# Patient Record
Sex: FEMALE | Race: WHITE | Employment: UNEMPLOYED | URBAN - METROPOLITAN AREA
[De-identification: names, ages, dates, MRNs, and addresses within clinical notes are randomized per-mention and may not be internally consistent; named-entity substitution may affect disease eponyms.]

---

## 2021-11-13 ENCOUNTER — HOSPITAL ENCOUNTER (EMERGENCY)
Age: 61
Discharge: HOME | End: 2021-11-13
Attending: EMERGENCY MEDICINE | Admitting: EMERGENCY MEDICINE
Payer: COMMERCIAL

## 2021-11-13 ENCOUNTER — APPOINTMENT (OUTPATIENT)
Dept: GENERAL RADIOLOGY | Age: 61
End: 2021-11-13
Attending: PHYSICIAN ASSISTANT
Payer: COMMERCIAL

## 2021-11-13 VITALS
WEIGHT: 167.77 LBS | DIASTOLIC BLOOD PRESSURE: 84 MMHG | HEIGHT: 64 IN | BODY MASS INDEX: 28.64 KG/M2 | TEMPERATURE: 98.5 F | HEART RATE: 86 BPM | OXYGEN SATURATION: 98 % | SYSTOLIC BLOOD PRESSURE: 119 MMHG | RESPIRATION RATE: 14 BRPM
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
## 2021-11-13 LAB
ALBUMIN SERPL-MCNC: 3.7 G/DL (ref 3.5–5)
ALBUMIN/GLOB SERPL: 0.9 {RATIO} (ref 1.1–2.2)
ALP SERPL-CCNC: 55 U/L (ref 45–117)
ALT SERPL-CCNC: 31 U/L (ref 12–78)
ANION GAP SERPL CALC-SCNC: 7 MMOL/L (ref 5–15)
APTT PPP: 26.6 SEC (ref 22.1–31)
AST SERPL-CCNC: 18 U/L (ref 15–37)
BASOPHILS # BLD: 0.1 K/UL (ref 0–0.1)
BASOPHILS NFR BLD: 1 % (ref 0–1)
BILIRUB SERPL-MCNC: 0.3 MG/DL (ref 0.2–1)
BUN SERPL-MCNC: 12 MG/DL (ref 6–20)
BUN/CREAT SERPL: 12 (ref 12–20)
CALCIUM SERPL-MCNC: 10.1 MG/DL (ref 8.5–10.1)
CHLORIDE SERPL-SCNC: 101 MMOL/L (ref 97–108)
CO2 SERPL-SCNC: 29 MMOL/L (ref 21–32)
CREAT SERPL-MCNC: 1.03 MG/DL (ref 0.55–1.02)
DIFFERENTIAL METHOD BLD: NORMAL
EOSINOPHIL # BLD: 0.1 K/UL (ref 0–0.4)
EOSINOPHIL NFR BLD: 1 % (ref 0–7)
ERYTHROCYTE [DISTWIDTH] IN BLOOD BY AUTOMATED COUNT: 13.5 % (ref 11.5–14.5)
GLOBULIN SER CALC-MCNC: 3.9 G/DL (ref 2–4)
GLUCOSE SERPL-MCNC: 99 MG/DL (ref 65–100)
HCT VFR BLD AUTO: 42.9 % (ref 35–47)
HGB BLD-MCNC: 14.5 G/DL (ref 11.5–16)
IMM GRANULOCYTES # BLD AUTO: 0 K/UL (ref 0–0.04)
IMM GRANULOCYTES NFR BLD AUTO: 0 % (ref 0–0.5)
INR PPP: 1 (ref 0.9–1.1)
LYMPHOCYTES # BLD: 3.1 K/UL (ref 0.8–3.5)
LYMPHOCYTES NFR BLD: 28 % (ref 12–49)
MCH RBC QN AUTO: 31 PG (ref 26–34)
MCHC RBC AUTO-ENTMCNC: 33.8 G/DL (ref 30–36.5)
MCV RBC AUTO: 91.9 FL (ref 80–99)
MONOCYTES # BLD: 0.7 K/UL (ref 0–1)
MONOCYTES NFR BLD: 6 % (ref 5–13)
NEUTS SEG # BLD: 7 K/UL (ref 1.8–8)
NEUTS SEG NFR BLD: 64 % (ref 32–75)
NRBC # BLD: 0 K/UL (ref 0–0.01)
NRBC BLD-RTO: 0 PER 100 WBC
PLATELET # BLD AUTO: 359 K/UL (ref 150–400)
PMV BLD AUTO: 9.5 FL (ref 8.9–12.9)
POTASSIUM SERPL-SCNC: 3.1 MMOL/L (ref 3.5–5.1)
PROT SERPL-MCNC: 7.6 G/DL (ref 6.4–8.2)
PROTHROMBIN TIME: 10.3 SEC (ref 9–11.1)
RBC # BLD AUTO: 4.67 M/UL (ref 3.8–5.2)
SODIUM SERPL-SCNC: 137 MMOL/L (ref 136–145)
THERAPEUTIC RANGE,PTTT: NORMAL SECS (ref 58–77)
WBC # BLD AUTO: 11 K/UL (ref 3.6–11)

## 2021-11-13 PROCEDURE — 99282 EMERGENCY DEPT VISIT SF MDM: CPT

## 2021-11-13 PROCEDURE — 73090 X-RAY EXAM OF FOREARM: CPT

## 2021-11-13 PROCEDURE — 85610 PROTHROMBIN TIME: CPT

## 2021-11-13 PROCEDURE — 80053 COMPREHEN METABOLIC PANEL: CPT

## 2021-11-13 PROCEDURE — 36415 COLL VENOUS BLD VENIPUNCTURE: CPT

## 2021-11-13 PROCEDURE — 85730 THROMBOPLASTIN TIME PARTIAL: CPT

## 2021-11-13 PROCEDURE — 85025 COMPLETE CBC W/AUTO DIFF WBC: CPT

## 2021-11-13 RX ORDER — DOXYCYCLINE 100 MG/1
100 CAPSULE ORAL 2 TIMES DAILY
Qty: 20 CAPSULE | Refills: 0 | Status: SHIPPED | OUTPATIENT
Start: 2021-11-13 | End: 2021-11-23

## 2021-11-13 RX ORDER — IBUPROFEN 600 MG/1
600 TABLET ORAL
Qty: 20 TABLET | Refills: 0 | Status: SHIPPED | OUTPATIENT
Start: 2021-11-13

## 2021-11-13 NOTE — ED NOTES
Pt reporting [facility] contacted poison control.  Pt did not visualize the snake and was bit between 2400 and 0100 this am.     1415 Per PA, not to contact poison control again at this time

## 2021-11-13 NOTE — ED PROVIDER NOTES
EMERGENCY DEPARTMENT HISTORY AND PHYSICAL EXAM      Date: 11/13/2021  Patient Name: Geraldine Montgomery    History of Presenting Illness     Chief Complaint   Patient presents with    Snake Bite     getting belem boxes decorations out of garage at midnight felt jab left forearm; went to [facility] today sent to ED for snake bite. left forearm aches stings and slight throb. History Provided By: Patient    HPI: Geraldine Montgomery, 63 y.o. female presents ambulatory with her daughter to the ED with cc of about 14 hours of 4 out of 10 constant, achy tenderness to the skin of the left forearm that is worse with movement and palpation. She tells me around midnight last night she went out to her she had to move some [place] boxes. She tells me it was pitch black and when she went to reach for the box she felt the sudden, sharp pain of her left forearm. She has me ultimately she went back inside and noticed \"bite marks\" of her left forearm. She believes she was bit by a snake. She tells me it was pitch black and she did not see a snake. She does not feel unwell. There has been no vomiting. She tells me she went to an urgent care earlier and her tetanus was updated. Because there is concern of a snake bite, she was referred here. She is right-hand dominant. She denies any difficulty moving her hand, wrist, forearm or elbow. There are no other complaints, changes, or physical findings at this time. PCP: Linda Allen MD    Current Outpatient Medications   Medication Sig Dispense Refill    doxycycline (MONODOX) 100 mg capsule Take 1 Capsule by mouth two (2) times a day for 10 days. 20 Capsule 0    ibuprofen (MOTRIN) 600 mg tablet Take 1 Tablet by mouth every six (6) hours as needed for Pain. 20 Tablet 0    hydrochlorothiazide (HYDRODIURIL) 25 mg tablet Take 25 mg by mouth daily.        Past History     Past Medical History:  Past Medical History:   Diagnosis Date    High cholesterol     Infectious disease     Other ill-defined conditions(739.89)     fluid build up in chest       Past Surgical History:  Past Surgical History:   Procedure Laterality Date    HX BREAST BIOPSY         Family History:  No family history on file. Social History:  Social History     Tobacco Use    Smoking status: Current Every Day Smoker     Packs/day: 0.50    Smokeless tobacco: Not on file   Substance Use Topics    Alcohol use: Yes     Comment: about 4-6 beers per week    Drug use: Yes     Types: Marijuana     Comment: pt jeromy mayes yesterday       Allergies: Allergies   Allergen Reactions    Erythromycin Other (comments)     \"Chest Pain\"    Pcn [Penicillins] Rash    Sulfa (Sulfonamide Antibiotics) Rash     Review of Systems   Review of Systems   Constitutional: Negative for fatigue and fever. HENT: Negative for ear pain and sore throat. Eyes: Negative for pain, redness and visual disturbance. Respiratory: Negative for cough and shortness of breath. Cardiovascular: Negative for chest pain and palpitations. Gastrointestinal: Negative for abdominal pain, nausea and vomiting. Genitourinary: Negative for dysuria, frequency and urgency. Musculoskeletal: Negative for back pain, gait problem, neck pain and neck stiffness. Skin: Positive for wound (Possible snake bite to the left forearm). Negative for rash. Neurological: Negative for dizziness, weakness, light-headedness, numbness and headaches. Physical Exam   Physical Exam  Vitals and nursing note reviewed. Constitutional:       General: She is not in acute distress. Appearance: She is well-developed. She is not toxic-appearing. HENT:      Head: Normocephalic and atraumatic. Jaw: No trismus. Right Ear: External ear normal.      Left Ear: External ear normal.      Nose: Nose normal.      Mouth/Throat:      Pharynx: Uvula midline. Eyes:      General: No scleral icterus.      Conjunctiva/sclera: Conjunctivae normal. Pupils: Pupils are equal, round, and reactive to light. Cardiovascular:      Rate and Rhythm: Normal rate and regular rhythm. Pulmonary:      Effort: Pulmonary effort is normal. No tachypnea, accessory muscle usage or respiratory distress. Breath sounds: No decreased breath sounds or wheezing. Abdominal:      Palpations: Abdomen is soft. Tenderness: There is no abdominal tenderness. Musculoskeletal:         General: Normal range of motion. Left forearm: Tenderness present. No swelling. Arms:       Cervical back: Full passive range of motion without pain and normal range of motion. Comments:   LEFT FOREARM:  Several abrasions of the left forearm without swelling or redness. She has full active range of motion of all joints of all fingers, wrist, elbow and shoulder. There is mild local tenderness. Skin:     Findings: No rash. Neurological:      Mental Status: She is alert and oriented to person, place, and time. She is not disoriented. GCS: GCS eye subscore is 4. GCS verbal subscore is 5. GCS motor subscore is 6. Cranial Nerves: No cranial nerve deficit. Psychiatric:         Speech: Speech normal.       Diagnostic Study Results     Labs -     Recent Results (from the past 12 hour(s))   CBC WITH AUTOMATED DIFF    Collection Time: 11/13/21  1:16 PM   Result Value Ref Range    WBC 11.0 3.6 - 11.0 K/uL    RBC 4.67 3.80 - 5.20 M/uL    HGB 14.5 11.5 - 16.0 g/dL    HCT 42.9 35.0 - 47.0 %    MCV 91.9 80.0 - 99.0 FL    MCH 31.0 26.0 - 34.0 PG    MCHC 33.8 30.0 - 36.5 g/dL    RDW 13.5 11.5 - 14.5 %    PLATELET 001 527 - 808 K/uL    MPV 9.5 8.9 - 12.9 FL    NRBC 0.0 0  WBC    ABSOLUTE NRBC 0.00 0.00 - 0.01 K/uL    NEUTROPHILS 64 32 - 75 %    LYMPHOCYTES 28 12 - 49 %    MONOCYTES 6 5 - 13 %    EOSINOPHILS 1 0 - 7 %    BASOPHILS 1 0 - 1 %    IMMATURE GRANULOCYTES 0 0.0 - 0.5 %    ABS. NEUTROPHILS 7.0 1.8 - 8.0 K/UL    ABS. LYMPHOCYTES 3.1 0.8 - 3.5 K/UL    ABS.  MONOCYTES 0.7 0.0 - 1.0 K/UL    ABS. EOSINOPHILS 0.1 0.0 - 0.4 K/UL    ABS. BASOPHILS 0.1 0.0 - 0.1 K/UL    ABS. IMM. GRANS. 0.0 0.00 - 0.04 K/UL    DF AUTOMATED     METABOLIC PANEL, COMPREHENSIVE    Collection Time: 11/13/21  1:16 PM   Result Value Ref Range    Sodium 137 136 - 145 mmol/L    Potassium 3.1 (L) 3.5 - 5.1 mmol/L    Chloride 101 97 - 108 mmol/L    CO2 29 21 - 32 mmol/L    Anion gap 7 5 - 15 mmol/L    Glucose 99 65 - 100 mg/dL    BUN 12 6 - 20 MG/DL    Creatinine 1.03 (H) 0.55 - 1.02 MG/DL    BUN/Creatinine ratio 12 12 - 20      GFR est AA >60 >60 ml/min/1.73m2    GFR est non-AA 55 (L) >60 ml/min/1.73m2    Calcium 10.1 8.5 - 10.1 MG/DL    Bilirubin, total 0.3 0.2 - 1.0 MG/DL    ALT (SGPT) 31 12 - 78 U/L    AST (SGOT) 18 15 - 37 U/L    Alk. phosphatase 55 45 - 117 U/L    Protein, total 7.6 6.4 - 8.2 g/dL    Albumin 3.7 3.5 - 5.0 g/dL    Globulin 3.9 2.0 - 4.0 g/dL    A-G Ratio 0.9 (L) 1.1 - 2.2     PROTHROMBIN TIME + INR    Collection Time: 11/13/21  1:16 PM   Result Value Ref Range    INR 1.0 0.9 - 1.1      Prothrombin time 10.3 9.0 - 11.1 sec   PTT    Collection Time: 11/13/21  1:16 PM   Result Value Ref Range    aPTT 26.6 22.1 - 31.0 sec    aPTT, therapeutic range     58.0 - 77.0 SECS       Radiologic Studies -   XR FOREARM LT AP/LAT   Final Result   No fracture or foreign body. CT Results  (Last 48 hours)    None        CXR Results  (Last 48 hours)    None        Medical Decision Making   I am the first provider for this patient. I reviewed the vital signs, available nursing notes, past medical history, past surgical history, family history and social history. Vital Signs-Reviewed the patient's vital signs.   Patient Vitals for the past 12 hrs:   Temp Pulse Resp BP SpO2   11/13/21 1306 98.5 °F (36.9 °C) 86 14 119/84 98 %       Pulse Oximetry Analysis - 98% on RA    Records Reviewed: Nursing Notes, Old Medical Records, Previous Radiology Studies and Previous Laboratory Studies    Provider Notes (Medical Decision Making):   DDx: Snakebite, foreign body, cellulitis    Patient presents with an injury to the left forearm that happened around midnight last night. Patient believes she may have been bitten by a snake. She was seen at a local urgent care where her tetanus was updated. Blood tests today in the emergency department are unremarkable. On exam, she has some abrasions. Plain films are negative for fracture or foreign body. Case discussed with Dr. Samson Collins. Believe reasonable to cover with antibiotics, offer nonnarcotic pain medicine and return precautions. ED Course:   Initial assessment performed. The patients presenting problems have been discussed, and they are in agreement with the care plan formulated and outlined with them. I have encouraged them to ask questions as they arise throughout their visit. Disposition:  Discharge    PLAN:  1. Discharge Medication List as of 11/13/2021  3:46 PM      START taking these medications    Details   doxycycline (MONODOX) 100 mg capsule Take 1 Capsule by mouth two (2) times a day for 10 days. , Normal, Disp-20 Capsule, R-0      ibuprofen (MOTRIN) 600 mg tablet Take 1 Tablet by mouth every six (6) hours as needed for Pain., Normal, Disp-20 Tablet, R-0         CONTINUE these medications which have NOT CHANGED    Details   hydrochlorothiazide (HYDRODIURIL) 25 mg tablet Take 25 mg by mouth daily. , Historical Med           2. Follow-up Information     Follow up With Specialties Details Why Contact Info    Jermaine Cordova MD Family Medicine Call  PRIMARY CARE: as needed [address]  [phone]          Return to ED if worse     Diagnosis     Clinical Impression:   1.  Snake bite, initial encounter

## 2022-12-07 ENCOUNTER — APPOINTMENT (OUTPATIENT)
Dept: CT IMAGING | Age: 62
DRG: 392 | End: 2022-12-07
Attending: INTERNAL MEDICINE
Payer: COMMERCIAL

## 2022-12-07 ENCOUNTER — APPOINTMENT (OUTPATIENT)
Dept: GENERAL RADIOLOGY | Age: 62
DRG: 392 | End: 2022-12-07
Attending: EMERGENCY MEDICINE
Payer: COMMERCIAL

## 2022-12-07 ENCOUNTER — APPOINTMENT (OUTPATIENT)
Dept: CT IMAGING | Age: 62
DRG: 392 | End: 2022-12-07
Attending: EMERGENCY MEDICINE
Payer: COMMERCIAL

## 2022-12-07 ENCOUNTER — HOSPITAL ENCOUNTER (INPATIENT)
Age: 62
LOS: 2 days | Discharge: HOME | DRG: 392 | End: 2022-12-09
Attending: EMERGENCY MEDICINE | Admitting: INTERNAL MEDICINE
Payer: COMMERCIAL

## 2022-12-07 DIAGNOSIS — K57.32 DIVERTICULITIS OF SIGMOID COLON: Primary | ICD-10-CM

## 2022-12-07 DIAGNOSIS — R16.0 LIVER MASSES: ICD-10-CM

## 2022-12-07 DIAGNOSIS — F41.9 ANXIETY: ICD-10-CM

## 2022-12-07 DIAGNOSIS — E87.6 HYPOKALEMIA: ICD-10-CM

## 2022-12-07 DIAGNOSIS — E87.1 HYPONATREMIA: ICD-10-CM

## 2022-12-07 DIAGNOSIS — R11.2 NAUSEA AND VOMITING, UNSPECIFIED VOMITING TYPE: ICD-10-CM

## 2022-12-07 PROBLEM — J11.1 INFLUENZA-LIKE ILLNESS: Status: ACTIVE | Noted: 2022-12-07

## 2022-12-07 LAB
ALBUMIN SERPL-MCNC: 2.2 G/DL (ref 3.5–5)
ALBUMIN/GLOB SERPL: 0.5 {RATIO} (ref 1.1–2.2)
ALP SERPL-CCNC: 169 U/L (ref 45–117)
ALT SERPL-CCNC: 102 U/L (ref 12–78)
ANION GAP SERPL CALC-SCNC: 11 MMOL/L (ref 5–15)
ANION GAP SERPL CALC-SCNC: 9 MMOL/L (ref 5–15)
APPEARANCE UR: CLEAR
AST SERPL-CCNC: 64 U/L (ref 15–37)
ATRIAL RATE: 61 BPM
BACTERIA URNS QL MICRO: ABNORMAL /HPF
BASOPHILS # BLD: 0 K/UL (ref 0–0.1)
BASOPHILS NFR BLD: 0 % (ref 0–1)
BILIRUB SERPL-MCNC: 0.9 MG/DL (ref 0.2–1)
BILIRUB UR QL: NEGATIVE
BUN SERPL-MCNC: 10 MG/DL (ref 6–20)
BUN SERPL-MCNC: 11 MG/DL (ref 6–20)
BUN/CREAT SERPL: 15 (ref 12–20)
BUN/CREAT SERPL: 15 (ref 12–20)
CALCIUM SERPL-MCNC: 8.8 MG/DL (ref 8.5–10.1)
CALCIUM SERPL-MCNC: 9 MG/DL (ref 8.5–10.1)
CALCULATED P AXIS, ECG09: 24 DEGREES
CALCULATED R AXIS, ECG10: -7 DEGREES
CALCULATED T AXIS, ECG11: 35 DEGREES
CEA SERPL-MCNC: 3.1 NG/ML
CHLORIDE SERPL-SCNC: 82 MMOL/L (ref 97–108)
CHLORIDE SERPL-SCNC: 83 MMOL/L (ref 97–108)
CK SERPL-CCNC: 28 U/L (ref 26–192)
CO2 SERPL-SCNC: 35 MMOL/L (ref 21–32)
CO2 SERPL-SCNC: 35 MMOL/L (ref 21–32)
COLOR UR: ABNORMAL
COVID-19 RAPID TEST, COVR: NOT DETECTED
CREAT SERPL-MCNC: 0.68 MG/DL (ref 0.55–1.02)
CREAT SERPL-MCNC: 0.71 MG/DL (ref 0.55–1.02)
DIAGNOSIS, 93000: NORMAL
DIFFERENTIAL METHOD BLD: ABNORMAL
EOSINOPHIL # BLD: 0 K/UL (ref 0–0.4)
EOSINOPHIL NFR BLD: 0 % (ref 0–7)
EPITH CASTS URNS QL MICRO: ABNORMAL /LPF
ERYTHROCYTE [DISTWIDTH] IN BLOOD BY AUTOMATED COUNT: 13.2 % (ref 11.5–14.5)
FLUAV AG NPH QL IA: NEGATIVE
FLUBV AG NOSE QL IA: NEGATIVE
GLOBULIN SER CALC-MCNC: 4.8 G/DL (ref 2–4)
GLUCOSE SERPL-MCNC: 117 MG/DL (ref 65–100)
GLUCOSE SERPL-MCNC: 123 MG/DL (ref 65–100)
GLUCOSE UR STRIP.AUTO-MCNC: NEGATIVE MG/DL
HCT VFR BLD AUTO: 37.2 % (ref 35–47)
HETEROPH AB BLD QL IA: NEGATIVE
HGB BLD-MCNC: 13.3 G/DL (ref 11.5–16)
HGB UR QL STRIP: ABNORMAL
IMM GRANULOCYTES # BLD AUTO: 0 K/UL (ref 0–0.04)
IMM GRANULOCYTES NFR BLD AUTO: 0 % (ref 0–0.5)
KETONES UR QL STRIP.AUTO: NEGATIVE MG/DL
LACTATE BLD-SCNC: 0.57 MMOL/L (ref 0.4–2)
LEUKOCYTE ESTERASE UR QL STRIP.AUTO: ABNORMAL
LIPASE SERPL-CCNC: 142 U/L (ref 73–393)
LYMPHOCYTES # BLD: 0.6 K/UL (ref 0.8–3.5)
LYMPHOCYTES NFR BLD: 3 % (ref 12–49)
MAGNESIUM SERPL-MCNC: 2 MG/DL (ref 1.6–2.4)
MCH RBC QN AUTO: 31.4 PG (ref 26–34)
MCHC RBC AUTO-ENTMCNC: 35.8 G/DL (ref 30–36.5)
MCV RBC AUTO: 87.9 FL (ref 80–99)
MONOCYTES # BLD: 0.2 K/UL (ref 0–1)
MONOCYTES NFR BLD: 1 % (ref 5–13)
NEUTS BAND NFR BLD MANUAL: 1 %
NEUTS SEG # BLD: 20.6 K/UL (ref 1.8–8)
NEUTS SEG NFR BLD: 95 % (ref 32–75)
NITRITE UR QL STRIP.AUTO: NEGATIVE
NRBC # BLD: 0 K/UL (ref 0–0.01)
NRBC BLD-RTO: 0 PER 100 WBC
P-R INTERVAL, ECG05: 146 MS
PH UR STRIP: 7 [PH] (ref 5–8)
PLATELET # BLD AUTO: 316 K/UL (ref 150–400)
PMV BLD AUTO: 10.6 FL (ref 8.9–12.9)
POTASSIUM SERPL-SCNC: 2.6 MMOL/L (ref 3.5–5.1)
POTASSIUM SERPL-SCNC: 2.7 MMOL/L (ref 3.5–5.1)
PROT SERPL-MCNC: 7 G/DL (ref 6.4–8.2)
PROT UR STRIP-MCNC: ABNORMAL MG/DL
Q-T INTERVAL, ECG07: 470 MS
QRS DURATION, ECG06: 104 MS
QTC CALCULATION (BEZET), ECG08: 473 MS
RBC # BLD AUTO: 4.23 M/UL (ref 3.8–5.2)
RBC #/AREA URNS HPF: ABNORMAL /HPF (ref 0–5)
RBC MORPH BLD: ABNORMAL
SODIUM SERPL-SCNC: 126 MMOL/L (ref 136–145)
SODIUM SERPL-SCNC: 129 MMOL/L (ref 136–145)
SOURCE, COVRS: NORMAL
SP GR UR REFRACTOMETRY: 1
TROPONIN-HIGH SENSITIVITY: 14 NG/L (ref 0–51)
UA: UC IF INDICATED,UAUC: ABNORMAL
UROBILINOGEN UR QL STRIP.AUTO: 1 EU/DL (ref 0.2–1)
VENTRICULAR RATE, ECG03: 61 BPM
WBC # BLD AUTO: 21.4 K/UL (ref 3.6–11)
WBC URNS QL MICRO: ABNORMAL /HPF (ref 0–4)

## 2022-12-07 PROCEDURE — 87804 INFLUENZA ASSAY W/OPTIC: CPT

## 2022-12-07 PROCEDURE — 87635 SARS-COV-2 COVID-19 AMP PRB: CPT

## 2022-12-07 PROCEDURE — 74011250637 HC RX REV CODE- 250/637: Performed by: NURSE PRACTITIONER

## 2022-12-07 PROCEDURE — 71260 CT THORAX DX C+: CPT

## 2022-12-07 PROCEDURE — 71046 X-RAY EXAM CHEST 2 VIEWS: CPT

## 2022-12-07 PROCEDURE — 74011250636 HC RX REV CODE- 250/636: Performed by: INTERNAL MEDICINE

## 2022-12-07 PROCEDURE — 82550 ASSAY OF CK (CPK): CPT

## 2022-12-07 PROCEDURE — 74011000636 HC RX REV CODE- 636: Performed by: INTERNAL MEDICINE

## 2022-12-07 PROCEDURE — 83735 ASSAY OF MAGNESIUM: CPT

## 2022-12-07 PROCEDURE — 74011250636 HC RX REV CODE- 250/636: Performed by: EMERGENCY MEDICINE

## 2022-12-07 PROCEDURE — 96375 TX/PRO/DX INJ NEW DRUG ADDON: CPT

## 2022-12-07 PROCEDURE — 74011250637 HC RX REV CODE- 250/637: Performed by: INTERNAL MEDICINE

## 2022-12-07 PROCEDURE — 84484 ASSAY OF TROPONIN QUANT: CPT

## 2022-12-07 PROCEDURE — 85025 COMPLETE CBC W/AUTO DIFF WBC: CPT

## 2022-12-07 PROCEDURE — 83690 ASSAY OF LIPASE: CPT

## 2022-12-07 PROCEDURE — 74011000636 HC RX REV CODE- 636: Performed by: EMERGENCY MEDICINE

## 2022-12-07 PROCEDURE — 93005 ELECTROCARDIOGRAM TRACING: CPT

## 2022-12-07 PROCEDURE — 74011250637 HC RX REV CODE- 250/637: Performed by: EMERGENCY MEDICINE

## 2022-12-07 PROCEDURE — 65270000046 HC RM TELEMETRY

## 2022-12-07 PROCEDURE — 96365 THER/PROPH/DIAG IV INF INIT: CPT

## 2022-12-07 PROCEDURE — 83605 ASSAY OF LACTIC ACID: CPT

## 2022-12-07 PROCEDURE — 96368 THER/DIAG CONCURRENT INF: CPT

## 2022-12-07 PROCEDURE — 81001 URINALYSIS AUTO W/SCOPE: CPT

## 2022-12-07 PROCEDURE — 74011000250 HC RX REV CODE- 250

## 2022-12-07 PROCEDURE — 74011250637 HC RX REV CODE- 250/637

## 2022-12-07 PROCEDURE — 87040 BLOOD CULTURE FOR BACTERIA: CPT

## 2022-12-07 PROCEDURE — 99285 EMERGENCY DEPT VISIT HI MDM: CPT

## 2022-12-07 PROCEDURE — 86308 HETEROPHILE ANTIBODY SCREEN: CPT

## 2022-12-07 PROCEDURE — 80053 COMPREHEN METABOLIC PANEL: CPT

## 2022-12-07 PROCEDURE — 74011000250 HC RX REV CODE- 250: Performed by: INTERNAL MEDICINE

## 2022-12-07 PROCEDURE — 74011000258 HC RX REV CODE- 258: Performed by: EMERGENCY MEDICINE

## 2022-12-07 PROCEDURE — 36415 COLL VENOUS BLD VENIPUNCTURE: CPT

## 2022-12-07 PROCEDURE — 82378 CARCINOEMBRYONIC ANTIGEN: CPT

## 2022-12-07 PROCEDURE — 74177 CT ABD & PELVIS W/CONTRAST: CPT

## 2022-12-07 RX ORDER — KETOROLAC TROMETHAMINE 30 MG/ML
15 INJECTION, SOLUTION INTRAMUSCULAR; INTRAVENOUS
Status: COMPLETED | OUTPATIENT
Start: 2022-12-07 | End: 2022-12-07

## 2022-12-07 RX ORDER — NAPROXEN 500 MG/1
500 TABLET ORAL 2 TIMES DAILY WITH MEALS
Qty: 20 TABLET | Refills: 0 | Status: ON HOLD | OUTPATIENT
Start: 2022-12-07 | End: 2022-12-17

## 2022-12-07 RX ORDER — ONDANSETRON 4 MG/1
4 TABLET, ORALLY DISINTEGRATING ORAL
Status: DISCONTINUED | OUTPATIENT
Start: 2022-12-07 | End: 2022-12-10 | Stop reason: HOSPADM

## 2022-12-07 RX ORDER — ACETAMINOPHEN 500 MG
1000 TABLET ORAL
Status: COMPLETED | OUTPATIENT
Start: 2022-12-07 | End: 2022-12-07

## 2022-12-07 RX ORDER — OXYCODONE HYDROCHLORIDE 5 MG/1
5 TABLET ORAL
Status: DISCONTINUED | OUTPATIENT
Start: 2022-12-07 | End: 2022-12-10 | Stop reason: HOSPADM

## 2022-12-07 RX ORDER — LEVOFLOXACIN 5 MG/ML
750 INJECTION, SOLUTION INTRAVENOUS EVERY 24 HOURS
Status: DISCONTINUED | OUTPATIENT
Start: 2022-12-08 | End: 2022-12-07

## 2022-12-07 RX ORDER — MORPHINE SULFATE 2 MG/ML
1 INJECTION, SOLUTION INTRAMUSCULAR; INTRAVENOUS
Status: DISCONTINUED | OUTPATIENT
Start: 2022-12-07 | End: 2022-12-10 | Stop reason: HOSPADM

## 2022-12-07 RX ORDER — SODIUM CHLORIDE 0.9 % (FLUSH) 0.9 %
5-40 SYRINGE (ML) INJECTION AS NEEDED
Status: DISCONTINUED | OUTPATIENT
Start: 2022-12-07 | End: 2022-12-10 | Stop reason: HOSPADM

## 2022-12-07 RX ORDER — ONDANSETRON 4 MG/1
4 TABLET, FILM COATED ORAL
Qty: 20 TABLET | Refills: 0 | Status: ON HOLD | OUTPATIENT
Start: 2022-12-07

## 2022-12-07 RX ORDER — POTASSIUM CHLORIDE 20 MEQ/1
40 TABLET, EXTENDED RELEASE ORAL
Status: COMPLETED | OUTPATIENT
Start: 2022-12-07 | End: 2022-12-07

## 2022-12-07 RX ORDER — POTASSIUM CHLORIDE 7.45 MG/ML
10 INJECTION INTRAVENOUS ONCE
Status: COMPLETED | OUTPATIENT
Start: 2022-12-07 | End: 2022-12-07

## 2022-12-07 RX ORDER — ACETAMINOPHEN 650 MG/1
650 SUPPOSITORY RECTAL
Status: DISCONTINUED | OUTPATIENT
Start: 2022-12-07 | End: 2022-12-10 | Stop reason: HOSPADM

## 2022-12-07 RX ORDER — LEVOFLOXACIN 5 MG/ML
750 INJECTION, SOLUTION INTRAVENOUS EVERY 24 HOURS
Status: DISCONTINUED | OUTPATIENT
Start: 2022-12-08 | End: 2022-12-10 | Stop reason: HOSPADM

## 2022-12-07 RX ORDER — SODIUM CHLORIDE 0.9 % (FLUSH) 0.9 %
5-40 SYRINGE (ML) INJECTION EVERY 8 HOURS
Status: DISCONTINUED | OUTPATIENT
Start: 2022-12-07 | End: 2022-12-10 | Stop reason: HOSPADM

## 2022-12-07 RX ORDER — WATER FOR INJECTION,STERILE
VIAL (ML) INJECTION
Status: COMPLETED
Start: 2022-12-07 | End: 2022-12-07

## 2022-12-07 RX ORDER — POLYETHYLENE GLYCOL 3350 17 G/17G
34 POWDER, FOR SOLUTION ORAL 2 TIMES DAILY
Status: DISPENSED | OUTPATIENT
Start: 2022-12-07 | End: 2022-12-08

## 2022-12-07 RX ORDER — POLYETHYLENE GLYCOL 3350 17 G/17G
17 POWDER, FOR SOLUTION ORAL DAILY PRN
Status: DISCONTINUED | OUTPATIENT
Start: 2022-12-07 | End: 2022-12-10 | Stop reason: HOSPADM

## 2022-12-07 RX ORDER — ACETAMINOPHEN 325 MG/1
650 TABLET ORAL
Status: DISCONTINUED | OUTPATIENT
Start: 2022-12-07 | End: 2022-12-10 | Stop reason: HOSPADM

## 2022-12-07 RX ORDER — METRONIDAZOLE 500 MG/100ML
500 INJECTION, SOLUTION INTRAVENOUS EVERY 8 HOURS
Status: DISCONTINUED | OUTPATIENT
Start: 2022-12-07 | End: 2022-12-10 | Stop reason: HOSPADM

## 2022-12-07 RX ORDER — LANOLIN ALCOHOL/MO/W.PET/CERES
3 CREAM (GRAM) TOPICAL
Status: DISCONTINUED | OUTPATIENT
Start: 2022-12-07 | End: 2022-12-10 | Stop reason: HOSPADM

## 2022-12-07 RX ORDER — ACETAMINOPHEN 500 MG
TABLET ORAL
Status: COMPLETED
Start: 2022-12-07 | End: 2022-12-07

## 2022-12-07 RX ORDER — LEVOFLOXACIN 5 MG/ML
750 INJECTION, SOLUTION INTRAVENOUS
Status: DISCONTINUED | OUTPATIENT
Start: 2022-12-08 | End: 2022-12-07

## 2022-12-07 RX ORDER — DIAZEPAM 10 MG/2ML
10 INJECTION INTRAMUSCULAR
Status: DISCONTINUED | OUTPATIENT
Start: 2022-12-07 | End: 2022-12-10 | Stop reason: HOSPADM

## 2022-12-07 RX ORDER — FOLIC ACID 1 MG/1
1 TABLET ORAL DAILY
Status: DISCONTINUED | OUTPATIENT
Start: 2022-12-07 | End: 2022-12-10 | Stop reason: HOSPADM

## 2022-12-07 RX ORDER — CEFTRIAXONE 500 MG/1
INJECTION, POWDER, FOR SOLUTION INTRAMUSCULAR; INTRAVENOUS
Status: DISCONTINUED
Start: 2022-12-07 | End: 2022-12-07 | Stop reason: SDUPTHER

## 2022-12-07 RX ORDER — POTASSIUM CHLORIDE 20 MEQ/1
TABLET, EXTENDED RELEASE ORAL
Status: COMPLETED
Start: 2022-12-07 | End: 2022-12-07

## 2022-12-07 RX ORDER — DIAZEPAM 10 MG/2ML
5 INJECTION INTRAMUSCULAR
Status: DISCONTINUED | OUTPATIENT
Start: 2022-12-07 | End: 2022-12-10 | Stop reason: HOSPADM

## 2022-12-07 RX ORDER — SODIUM CHLORIDE 9 MG/ML
75 INJECTION, SOLUTION INTRAVENOUS CONTINUOUS
Status: DISCONTINUED | OUTPATIENT
Start: 2022-12-07 | End: 2022-12-08

## 2022-12-07 RX ORDER — LEVOFLOXACIN 5 MG/ML
750 INJECTION, SOLUTION INTRAVENOUS
Status: COMPLETED | OUTPATIENT
Start: 2022-12-07 | End: 2022-12-07

## 2022-12-07 RX ORDER — ONDANSETRON 2 MG/ML
4 INJECTION INTRAMUSCULAR; INTRAVENOUS
Status: DISCONTINUED | OUTPATIENT
Start: 2022-12-07 | End: 2022-12-10 | Stop reason: HOSPADM

## 2022-12-07 RX ORDER — ASPIRIN 325 MG/1
100 TABLET, FILM COATED ORAL DAILY
Status: DISCONTINUED | OUTPATIENT
Start: 2022-12-07 | End: 2022-12-10 | Stop reason: HOSPADM

## 2022-12-07 RX ORDER — HYDRALAZINE HYDROCHLORIDE 20 MG/ML
10 INJECTION INTRAMUSCULAR; INTRAVENOUS
Status: DISCONTINUED | OUTPATIENT
Start: 2022-12-07 | End: 2022-12-10 | Stop reason: HOSPADM

## 2022-12-07 RX ORDER — POTASSIUM CHLORIDE 7.45 MG/ML
INJECTION INTRAVENOUS
Status: DISCONTINUED
Start: 2022-12-07 | End: 2022-12-07 | Stop reason: SDUPTHER

## 2022-12-07 RX ORDER — ONDANSETRON 2 MG/ML
4 INJECTION INTRAMUSCULAR; INTRAVENOUS
Status: COMPLETED | OUTPATIENT
Start: 2022-12-07 | End: 2022-12-07

## 2022-12-07 RX ADMIN — ONDANSETRON 4 MG: 2 INJECTION INTRAMUSCULAR; INTRAVENOUS at 02:11

## 2022-12-07 RX ADMIN — SODIUM CHLORIDE 75 ML/HR: 9 INJECTION, SOLUTION INTRAVENOUS at 16:33

## 2022-12-07 RX ADMIN — SODIUM CHLORIDE 1000 ML: 9 INJECTION, SOLUTION INTRAVENOUS at 03:02

## 2022-12-07 RX ADMIN — Medication 1 TABLET: at 09:21

## 2022-12-07 RX ADMIN — POLYETHYLENE GLYCOL 3350 34 G: 17 POWDER, FOR SOLUTION ORAL at 20:39

## 2022-12-07 RX ADMIN — CEFTRIAXONE SODIUM 1 G: 500 INJECTION, POWDER, FOR SOLUTION INTRAMUSCULAR; INTRAVENOUS at 03:07

## 2022-12-07 RX ADMIN — KETOROLAC TROMETHAMINE 15 MG: 30 INJECTION, SOLUTION INTRAMUSCULAR at 02:11

## 2022-12-07 RX ADMIN — WATER 20 ML: 1 INJECTION INTRAMUSCULAR; INTRAVENOUS; SUBCUTANEOUS at 03:08

## 2022-12-07 RX ADMIN — POTASSIUM CHLORIDE 40 MEQ: 1500 TABLET, EXTENDED RELEASE ORAL at 03:08

## 2022-12-07 RX ADMIN — ACETAMINOPHEN 650 MG: 325 TABLET ORAL at 17:31

## 2022-12-07 RX ADMIN — METRONIDAZOLE 500 MG: 500 INJECTION, SOLUTION INTRAVENOUS at 16:35

## 2022-12-07 RX ADMIN — IOPAMIDOL 100 ML: 755 INJECTION, SOLUTION INTRAVENOUS at 05:29

## 2022-12-07 RX ADMIN — FOLIC ACID 1 MG: 1 TABLET ORAL at 09:21

## 2022-12-07 RX ADMIN — SODIUM CHLORIDE, PRESERVATIVE FREE 10 ML: 5 INJECTION INTRAVENOUS at 09:22

## 2022-12-07 RX ADMIN — METRONIDAZOLE 500 MG: 500 INJECTION, SOLUTION INTRAVENOUS at 06:28

## 2022-12-07 RX ADMIN — SODIUM CHLORIDE 75 ML/HR: 9 INJECTION, SOLUTION INTRAVENOUS at 09:21

## 2022-12-07 RX ADMIN — ACETAMINOPHEN 1000 MG: 500 TABLET ORAL at 03:11

## 2022-12-07 RX ADMIN — SODIUM CHLORIDE, PRESERVATIVE FREE 10 ML: 5 INJECTION INTRAVENOUS at 16:35

## 2022-12-07 RX ADMIN — THIAMINE HCL TAB 100 MG 100 MG: 100 TAB at 09:21

## 2022-12-07 RX ADMIN — POTASSIUM CHLORIDE 40 MEQ: 20 TABLET, EXTENDED RELEASE ORAL at 02:25

## 2022-12-07 RX ADMIN — SODIUM CHLORIDE, PRESERVATIVE FREE 10 ML: 5 INJECTION INTRAVENOUS at 22:18

## 2022-12-07 RX ADMIN — LEVOFLOXACIN 750 MG: 5 INJECTION, SOLUTION INTRAVENOUS at 06:28

## 2022-12-07 RX ADMIN — IOPAMIDOL 100 ML: 755 INJECTION, SOLUTION INTRAVENOUS at 08:29

## 2022-12-07 RX ADMIN — POTASSIUM CHLORIDE 10 MEQ: 10 INJECTION, SOLUTION INTRAVENOUS at 03:08

## 2022-12-07 RX ADMIN — Medication 1000 MG: at 03:11

## 2022-12-07 RX ADMIN — METRONIDAZOLE 500 MG: 500 INJECTION, SOLUTION INTRAVENOUS at 22:18

## 2022-12-07 NOTE — H&P
Hospitalist Admission Note    NAME: Krupa Teague   :  1960   MRN:  [id number]     Date/Time:  2022 7:57 AM    Patient PCP: Mami Ambrosio MD  ______________________________________________________________________  Given the patient's current clinical presentation, I have a high level of concern for decompensation if discharged from the emergency department. Complex decision making was performed, which includes reviewing the patient's available past medical records, laboratory results, and x-ray films. My assessment of this patient's clinical condition and my plan of care is as follows. Assessment / Plan:  Acute diverticulitis  Liver masses concerning for metastatic disease  Generalized weakness  CT a/p showed mild sigmoid diverticulitis. Multiple mass lesions are noted within the liver compatible with metastatic disease. COVID and flu is ruled out   Will check CT chest to r/o lung pathology  F/u with Bcx  Check AFP  Will start IV levaquin/metronidazole  Suspect she will need biopsy  Keep NPO, will consult GI. Discussed with Dr Abbe Kat    Alcohol use  CIWA, MVI, folate thiamine     Tobacco use  Nicotine patch, counseling provided    Code Status: Full  DVT Prophylaxis:SCD for now   GI Prophylaxis: not indicated  Baseline: from home      Subjective:   CHIEF COMPLAINT: flu like symptoms    HISTORY OF PRESENT ILLNESS:     [facility] is a 63 y.o.   female with PMHx significant for HTN, HLD, alcohol use, presents to the ER for evaluation of flu like symptoms to include myalgia, generalized weakness and nausea/vomiting for the last few days. Pt also endorse subjective fever  with night sweats and inability to ambulate due to weakness. No fever, cp, sob, palpitations. Vitals/labs/imaging reviewed  We were asked to admit for work up and evaluation of the above problems.      Past Medical History:   Diagnosis Date    High cholesterol     Infectious disease     Other ill-defined conditions(799.89)     fluid build up in chest        Past Surgical History:   Procedure Laterality Date    HX BREAST BIOPSY         Social History     Tobacco Use    Smoking status: Every Day     Packs/day: 0.50     Types: Cigarettes    Smokeless tobacco: Not on file   Substance Use Topics    Alcohol use: Yes     Comment: about 4-6 beers per week        History reviewed. No pertinent family history. Allergies   Allergen Reactions    Erythromycin Other (comments)     \"Chest Pain\"    Pcn [Penicillins] Rash    Sulfa (Sulfonamide Antibiotics) Rash        Prior to Admission medications    Medication Sig Start Date End Date Taking? Authorizing Provider   ondansetron hcl (Zofran) 4 mg tablet Take 1 Tablet by mouth every eight (8) hours as needed for Nausea. 12/7/22  Yes Abi Hickman MD   naproxen (Naprosyn) 500 mg tablet Take 1 Tablet by mouth two (2) times daily (with meals) for 10 days. 12/7/22 12/17/22 Yes Abi Hickman MD   ibuprofen (MOTRIN) 600 mg tablet Take 1 Tablet by mouth every six (6) hours as needed for Pain. 11/13/21   Collette Current, PA   hydrochlorothiazide (HYDRODIURIL) 25 mg tablet Take 25 mg by mouth daily. Other, MD Sayra       REVIEW OF SYSTEMS:     I am not able to complete the review of systems because:    The patient is intubated and sedated    The patient has altered mental status due to his acute medical problems    The patient has baseline aphasia from prior stroke(s)    The patient has baseline dementia and is not reliable historian    The patient is in acute medical distress and unable to provide information           Total of 12 systems reviewed as follows:       POSITIVE= BOLD text  Negative = text not BOLD  General:  fever, chills, sweats, generalized weakness, weight loss/gain,      loss of appetite   Eyes:    blurred vision, eye pain, loss of vision, double vision  ENT:    rhinorrhea, pharyngitis   Respiratory:   cough, sputum production, SOB, THAO, wheezing, pleuritic pain Cardiology:   chest pain, palpitations, orthopnea, PND, edema, syncope   Gastrointestinal:  abdominal pain , N/V, diarrhea, dysphagia, constipation, bleeding   Genitourinary:  frequency, urgency, dysuria, hematuria, incontinence   Muskuloskeletal :  arthralgia, myalgia, back pain  Hematology:  easy bruising, nose or gum bleeding, lymphadenopathy   Dermatological: rash, ulceration, pruritis, color change / jaundice  Endocrine:   hot flashes or polydipsia   Neurological:  headache, dizziness, confusion, focal weakness, paresthesia,     Speech difficulties, memory loss, gait difficulty  Psychological: Feelings of anxiety, depression, agitation    Objective:   VITALS:    Visit Vitals  /68   Pulse 64   Temp 97.7 °F (36.5 °C)   Ht 5' 4\" (1.626 m)   Wt 77.1 kg (170 lb)   SpO2 96%   BMI 29.18 kg/m²       PHYSICAL EXAM:    General:    Alert, cooperative, no distress, appears stated age. HEENT: Atraumatic, anicteric sclerae, pink conjunctivae     No oral ulcers, mucosa moist, throat clear  Neck:  Supple, symmetrical,  thyroid: non tender  Lungs:   CTA b/l. No wheezing or Rhonchi. No rales. Chest wall:  No tenderness. No accessory muscle use. Heart:   Regular  rhythm,  No  Murmur. No edema  Abdomen:   Soft, NT. ND  BS+  Extremities: No cyanosis. No clubbing,      Skin turgor normal, Radial dial pulse 2+. Capillary refill normal  Skin:     Not pale. Not Jaundiced  No rashes   Psych:  Not depressed. Not anxious or agitated. Neurologic: No facial asymmetry. No aphasia or slurred speech. Symmetrical strength, Sensation grossly intact.  AAOx4.     _______________________________________________________________________  Care Plan discussed with:    Comments   Patient x    Family  x    RN x    Care Manager                    Consultant:  x ED physician   _______________________________________________________________________  Expected  Disposition:   Home with Family    HH/PT/OT/RN x   SNF/LTC    [facility] ________________________________________________________________________  TOTAL TIME:  70 Minutes    Critical Care Provided     Minutes non procedure based      Comments    x Reviewed previous records   >50% of visit spent in counseling and coordination of care x Discussion with patient and/or family and questions answered       ________________________________________________________________________  Signed: Brett Chavez MD    Procedures: see electronic medical records for all procedures/Xrays and details which were not copied into this note but were reviewed prior to creation of Plan. LAB DATA REVIEWED:    Recent Results (from the past 24 hour(s))   INFLUENZA A+B VIRAL AGS    Collection Time: 12/07/22 12:40 AM   Result Value Ref Range    Influenza A Antigen Negative NEG      Influenza B Antigen Negative NEG     COVID-19 RAPID TEST    Collection Time: 12/07/22 12:40 AM   Result Value Ref Range    Specimen source Nasopharyngeal      COVID-19 rapid test Not detected NOTD     CBC WITH AUTOMATED DIFF    Collection Time: 12/07/22  1:14 AM   Result Value Ref Range    WBC 21.4 (H) 3.6 - 11.0 K/uL    RBC 4.23 3.80 - 5.20 M/uL    HGB 13.3 11.5 - 16.0 g/dL    HCT 37.2 35.0 - 47.0 %    MCV 87.9 80.0 - 99.0 FL    MCH 31.4 26.0 - 34.0 PG    MCHC 35.8 30.0 - 36.5 g/dL    RDW 13.2 11.5 - 14.5 %    PLATELET 626 438 - 300 K/uL    MPV 10.6 8.9 - 12.9 FL    NRBC 0.0 0  WBC    ABSOLUTE NRBC 0.00 0.00 - 0.01 K/uL    NEUTROPHILS 95 (H) 32 - 75 %    BAND NEUTROPHILS 1 %    LYMPHOCYTES 3 (L) 12 - 49 %    MONOCYTES 1 (L) 5 - 13 %    EOSINOPHILS 0 0 - 7 %    BASOPHILS 0 0 - 1 %    IMMATURE GRANULOCYTES 0 0.0 - 0.5 %    ABS. NEUTROPHILS 20.6 (H) 1.8 - 8.0 K/UL    ABS. LYMPHOCYTES 0.6 (L) 0.8 - 3.5 K/UL    ABS. MONOCYTES 0.2 0.0 - 1.0 K/UL    ABS. EOSINOPHILS 0.0 0.0 - 0.4 K/UL    ABS. BASOPHILS 0.0 0.0 - 0.1 K/UL    ABS. IMM.  GRANS. 0.0 0.00 - 0.04 K/UL    DF MANUAL      RBC COMMENTS NORMOCYTIC, NORMOCHROMIC     METABOLIC PANEL, COMPREHENSIVE    Collection Time: 12/07/22  1:14 AM   Result Value Ref Range    Sodium 126 (L) 136 - 145 mmol/L    Potassium 2.6 (LL) 3.5 - 5.1 mmol/L    Chloride 82 (L) 97 - 108 mmol/L    CO2 35 (H) 21 - 32 mmol/L    Anion gap 9 5 - 15 mmol/L    Glucose 123 (H) 65 - 100 mg/dL    BUN 10 6 - 20 MG/DL    Creatinine 0.68 0.55 - 1.02 MG/DL    BUN/Creatinine ratio 15 12 - 20      eGFR >60 >60 ml/min/1.73m2    Calcium 9.0 8.5 - 10.1 MG/DL    Bilirubin, total 0.9 0.2 - 1.0 MG/DL    ALT (SGPT) 102 (H) 12 - 78 U/L    AST (SGOT) 64 (H) 15 - 37 U/L    Alk.  phosphatase 169 (H) 45 - 117 U/L    Protein, total 7.0 6.4 - 8.2 g/dL    Albumin 2.2 (L) 3.5 - 5.0 g/dL    Globulin 4.8 (H) 2.0 - 4.0 g/dL    A-G Ratio 0.5 (L) 1.1 - 2.2     URINALYSIS W/ REFLEX CULTURE    Collection Time: 12/07/22  1:14 AM    Specimen: Urine   Result Value Ref Range    Color YELLOW/STRAW      Appearance CLEAR CLEAR      Specific gravity 1.005      pH (UA) 7.0 5.0 - 8.0      Protein TRACE (A) NEG mg/dL    Glucose Negative NEG mg/dL    Ketone Negative NEG mg/dL    Bilirubin Negative NEG      Blood SMALL (A) NEG      Urobilinogen 1.0 0.2 - 1.0 EU/dL    Nitrites Negative NEG      Leukocyte Esterase TRACE (A) NEG      WBC 0-4 0 - 4 /hpf    RBC 5-10 0 - 5 /hpf    Epithelial cells FEW FEW /lpf    Bacteria 1+ (A) NEG /hpf    UA:UC IF INDICATED CULTURE NOT INDICATED BY UA RESULT CNI     CULTURE, BLOOD, PAIRED    Collection Time: 12/07/22  1:50 AM    Specimen: Blood   Result Value Ref Range    Special Requests: NO SPECIAL REQUESTS      Culture result: NO GROWTH AFTER 4 HOURS     POC LACTIC ACID    Collection Time: 12/07/22  1:54 AM   Result Value Ref Range    Lactic Acid (POC) 0.57 0.40 - 2.00 mmol/L   LIPASE    Collection Time: 12/07/22  5:02 AM   Result Value Ref Range    Lipase 142 73 - 584 U/L   METABOLIC PANEL, BASIC    Collection Time: 12/07/22  5:02 AM   Result Value Ref Range    Sodium 129 (L) 136 - 145 mmol/L    Potassium 2.7 (LL) 3.5 - 5.1 mmol/L Chloride 83 (L) 97 - 108 mmol/L    CO2 35 (H) 21 - 32 mmol/L    Anion gap 11 5 - 15 mmol/L    Glucose 117 (H) 65 - 100 mg/dL    BUN 11 6 - 20 MG/DL    Creatinine 0.71 0.55 - 1.02 MG/DL    BUN/Creatinine ratio 15 12 - 20      eGFR >60 >60 ml/min/1.73m2    Calcium 8.8 8.5 - 10.1 MG/DL   MONONUCLEOSIS SCREEN    Collection Time: 12/07/22  5:02 AM   Result Value Ref Range    Mononucleosis screen Negative NEG     CK    Collection Time: 12/07/22  5:02 AM   Result Value Ref Range    CK 28 26 - 192 U/L   TROPONIN-HIGH SENSITIVITY    Collection Time: 12/07/22  5:02 AM   Result Value Ref Range    Troponin-High Sensitivity 14 0 - 51 ng/L   MAGNESIUM    Collection Time: 12/07/22  5:02 AM   Result Value Ref Range    Magnesium 2.0 1.6 - 2.4 mg/dL   EKG, 12 LEAD, INITIAL    Collection Time: 12/07/22  5:04 AM   Result Value Ref Range    Ventricular Rate 61 BPM    Atrial Rate 61 BPM    P-R Interval 146 ms    QRS Duration 104 ms    Q-T Interval 470 ms    QTC Calculation (Bezet) 473 ms    Calculated P Axis 24 degrees    Calculated R Axis -7 degrees    Calculated T Axis 35 degrees    Diagnosis       Normal sinus rhythm  Incomplete right bundle branch block  Minimal voltage criteria for LVH, may be normal variant  Nonspecific T wave abnormality  Prolonged QT  When compared with ECG of 09-MAY-2013 11:26,  No significant change was found

## 2022-12-07 NOTE — Clinical Note
[address]  [facility] EMERGENCY DEPT  [address] [id number] [phone]    Work/School Note    Date: 12/7/2022    To Whom It May concern:      Lashon Campbell was seen and treated today in the emergency room by the following provider(s):  Attending Provider: Marilu Gold MD.      Lashon Campbell is excused from work/school on 12/07/22. She is clear to return to work/school on 12/08/22.         Sincerely,          Amanda Byrd MD

## 2022-12-07 NOTE — CONSULTS
GI CONSULTATION NOTE  Dasha Astudillo, NP  [phone] NP in-hospital cell phone M-F until 4:30  After 5pm or on weekends, please call  for physician on call    NAME: Mary Jane Riley  :  1960  MRN: [id number]   Attending: Dr Janice Go   Primary GI: Dr Ashley Schuster  Date/Time:  2022 1:51 PM  Assessment:   Abnormal CT scan   Sigmoid colonic wall thickening - acute diverticulitis vs malignancy  Multiple liver lesions concerning for mets  ETOH use  General weakness  - CT abd/pel W 22 :  Mild sigmoid diverticulitis. Multiple mass lesions are noted within the liver compatible with metastatic disease.  - 22 : WBC 21.4, Hgb 13.3, Plt 316, INR 1.0, Na 129, K 2.7, Cr 0.7, TB 0.9, Albumin 2.2, , AST 64,   - Whiskey 8 ounces every day to every other day for the last 5+ yrs.   - Reports a change in bowel habits about a year ago, from formed stools to now loose with frequent specks of blood- thought it was hemorrhoids  - Colonoscopy  (Dr Crystal Weaver) - diverticulosis, hyperplastic polyp.  - No abdominal pain but mild generalized discomfort upon palpation.   - Nausea without vomiting, increasing fatigue the last 2 weeks  - No weight loss except in the last week - about 5-10lbs. Hypokalemia  Hyperlipidemia  Tobacco use  Plan:   CEA, AFP, CA 19-9 pending  Colonoscopy on Friday at 1330  Clear liquids today/tomorrow - NPO on 22  Golytely at 1600 tomorrow  Miralax 34gm tonight and tomorrow AM  Agree with levoquin and flagyl  Rest per primary team.     Plan discussed with Dr Ashley Schuster. Thank you for consultation. Subjective:     HISTORY OF PRESENT ILLNESS:     Mary Jane Riley is an 63 y.o.  female who we are asked to see for complaint of abnormal CT scan. Medical history includes hyperlipidemia, ETOH dependence, tobacco use.  Pt presented to the hospital for generalized fatigued and nausea without vomiting - started a little less than 2 weeks ago but has been getting worse. Felt hot and sweaty at times. Pt states that about a year ago, she had some stool habits changed from formed to loose about 5-6x per day with specks of blood. Continued loose stool but has actually decreased with decrease in intake. Thought blood was from hemorrhoids. Denies abdominal pain. No blood thinners. No weight loss except with acute illness. Colonoscopy 2013 - hyperplastic polyp. No family hx of CRC. Past Medical History:   Diagnosis Date    High cholesterol     Infectious disease     Other ill-defined conditions(799.89)     fluid build up in chest      Past Surgical History:   Procedure Laterality Date    HX BREAST BIOPSY       Social History     Tobacco Use    Smoking status: Every Day     Packs/day: 0.50     Types: Cigarettes    Smokeless tobacco: Not on file   Substance Use Topics    Alcohol use: Yes     Comment: about 4-6 beers per week      History reviewed. No pertinent family history. Allergies   Allergen Reactions    Erythromycin Other (comments)     \"Chest Pain\"    Pcn [Penicillins] Rash    Sulfa (Sulfonamide Antibiotics) Rash      Prior to Admission medications    Medication Sig Start Date End Date Taking? Authorizing Provider   ondansetron hcl (Zofran) 4 mg tablet Take 1 Tablet by mouth every eight (8) hours as needed for Nausea. 12/7/22  Yes Rancho Castle MD   naproxen (Naprosyn) 500 mg tablet Take 1 Tablet by mouth two (2) times daily (with meals) for 10 days. 12/7/22 12/17/22 Yes Rancho Castle MD   ibuprofen (MOTRIN) 600 mg tablet Take 1 Tablet by mouth every six (6) hours as needed for Pain. 11/13/21   QUINN Umana   hydrochlorothiazide (HYDRODIURIL) 25 mg tablet Take 25 mg by mouth daily.     Other, MD Sayra       Patient Active Problem List   Diagnosis Code    Influenza-like illness J11.1    Liver mass R16.0       REVIEW OF SYSTEMS:    Constitutional: negative fever, negative chills, negative weight loss  Eyes:   negative visual changes  ENT:   negative sore throat, tongue or lip swelling   Respiratory:  negative cough, negative dyspnea  Cards:  negative for chest pain, palpitations, lower extremity edema  GI:   See HPI  :  negative for frequency, dysuria  Integument:  negative for rash and pruritus  Heme:  negative for easy bruising and gum/nose bleeding  Musculoskel: negative for myalgias,  back pain and muscle weakness  Neuro: negative for headaches, dizziness, vertigo  Psych: negative for feelings of anxiety, depression     Pertinent Positives: fatigue, weakenss      Objective:   VITALS:    Visit Vitals  /68   Pulse 64   Temp 97.7 °F (36.5 °C)   Ht 5' 4\" (1.626 m)   Wt 77.1 kg (170 lb)   SpO2 96%   BMI 29.18 kg/m²       PHYSICAL EXAM:   General:          Alert, WD, WN, cooperative, no distress, appears stated age. Head:               Normocephalic, without obvious abnormality, atraumatic. Eyes:               Conjunctivae clear and pale, anicteric sclerae. Pupils are equal  Nose:               Nares normal.   Throat:             Lips, mucosa, and tongue normal.  No Thrush  Neck:               Supple, symmetrical,  no adenopathy, thyroid: non tender  Back:               Symmetric,  No CVA tenderness. Lungs:             CTA bilaterally. Chest wall:      No tenderness or deformity. No Accessory muscle use. Heart:              Regular rate and rhythm,  no murmur, rub or gallop. Abdomen:        Soft, mild generalized tenderness. Not distended. Bowel sounds normal. No masses  Extremities:     Atraumatic, No cyanosis. No edema. Skin:                Texture, turgor normal. No rashes/lesions/jaundice  Lymph:            Cervical, supraclavicular normal.  Psych:             Good insight. Not depressed. Not anxious or agitated. Neurologic:      EOMs intact. No facial asymmetry. No aphasia or slurred speech. Normal                        strength, A/O X 3.      LAB DATA REVIEWED:    Recent Results (from the past 24 hour(s))   INFLUENZA A+B VIRAL AGS Collection Time: 12/07/22 12:40 AM   Result Value Ref Range    Influenza A Antigen Negative NEG      Influenza B Antigen Negative NEG     COVID-19 RAPID TEST    Collection Time: 12/07/22 12:40 AM   Result Value Ref Range    Specimen source Nasopharyngeal      COVID-19 rapid test Not detected NOTD     CBC WITH AUTOMATED DIFF    Collection Time: 12/07/22  1:14 AM   Result Value Ref Range    WBC 21.4 (H) 3.6 - 11.0 K/uL    RBC 4.23 3.80 - 5.20 M/uL    HGB 13.3 11.5 - 16.0 g/dL    HCT 37.2 35.0 - 47.0 %    MCV 87.9 80.0 - 99.0 FL    MCH 31.4 26.0 - 34.0 PG    MCHC 35.8 30.0 - 36.5 g/dL    RDW 13.2 11.5 - 14.5 %    PLATELET 422 291 - 158 K/uL    MPV 10.6 8.9 - 12.9 FL    NRBC 0.0 0  WBC    ABSOLUTE NRBC 0.00 0.00 - 0.01 K/uL    NEUTROPHILS 95 (H) 32 - 75 %    BAND NEUTROPHILS 1 %    LYMPHOCYTES 3 (L) 12 - 49 %    MONOCYTES 1 (L) 5 - 13 %    EOSINOPHILS 0 0 - 7 %    BASOPHILS 0 0 - 1 %    IMMATURE GRANULOCYTES 0 0.0 - 0.5 %    ABS. NEUTROPHILS 20.6 (H) 1.8 - 8.0 K/UL    ABS. LYMPHOCYTES 0.6 (L) 0.8 - 3.5 K/UL    ABS. MONOCYTES 0.2 0.0 - 1.0 K/UL    ABS. EOSINOPHILS 0.0 0.0 - 0.4 K/UL    ABS. BASOPHILS 0.0 0.0 - 0.1 K/UL    ABS. IMM. GRANS. 0.0 0.00 - 0.04 K/UL    DF MANUAL      RBC COMMENTS NORMOCYTIC, NORMOCHROMIC     METABOLIC PANEL, COMPREHENSIVE    Collection Time: 12/07/22  1:14 AM   Result Value Ref Range    Sodium 126 (L) 136 - 145 mmol/L    Potassium 2.6 (LL) 3.5 - 5.1 mmol/L    Chloride 82 (L) 97 - 108 mmol/L    CO2 35 (H) 21 - 32 mmol/L    Anion gap 9 5 - 15 mmol/L    Glucose 123 (H) 65 - 100 mg/dL    BUN 10 6 - 20 MG/DL    Creatinine 0.68 0.55 - 1.02 MG/DL    BUN/Creatinine ratio 15 12 - 20      eGFR >60 >60 ml/min/1.73m2    Calcium 9.0 8.5 - 10.1 MG/DL    Bilirubin, total 0.9 0.2 - 1.0 MG/DL    ALT (SGPT) 102 (H) 12 - 78 U/L    AST (SGOT) 64 (H) 15 - 37 U/L    Alk.  phosphatase 169 (H) 45 - 117 U/L    Protein, total 7.0 6.4 - 8.2 g/dL    Albumin 2.2 (L) 3.5 - 5.0 g/dL    Globulin 4.8 (H) 2.0 - 4.0 g/dL A-G Ratio 0.5 (L) 1.1 - 2.2     URINALYSIS W/ REFLEX CULTURE    Collection Time: 12/07/22  1:14 AM    Specimen: Urine   Result Value Ref Range    Color YELLOW/STRAW      Appearance CLEAR CLEAR      Specific gravity 1.005      pH (UA) 7.0 5.0 - 8.0      Protein TRACE (A) NEG mg/dL    Glucose Negative NEG mg/dL    Ketone Negative NEG mg/dL    Bilirubin Negative NEG      Blood SMALL (A) NEG      Urobilinogen 1.0 0.2 - 1.0 EU/dL    Nitrites Negative NEG      Leukocyte Esterase TRACE (A) NEG      WBC 0-4 0 - 4 /hpf    RBC 5-10 0 - 5 /hpf    Epithelial cells FEW FEW /lpf    Bacteria 1+ (A) NEG /hpf    UA:UC IF INDICATED CULTURE NOT INDICATED BY UA RESULT CNI     CULTURE, BLOOD, PAIRED    Collection Time: 12/07/22  1:50 AM    Specimen: Blood   Result Value Ref Range    Special Requests: NO SPECIAL REQUESTS      Culture result: NO GROWTH AFTER 4 HOURS     POC LACTIC ACID    Collection Time: 12/07/22  1:54 AM   Result Value Ref Range    Lactic Acid (POC) 0.57 0.40 - 2.00 mmol/L   LIPASE    Collection Time: 12/07/22  5:02 AM   Result Value Ref Range    Lipase 142 73 - 116 U/L   METABOLIC PANEL, BASIC    Collection Time: 12/07/22  5:02 AM   Result Value Ref Range    Sodium 129 (L) 136 - 145 mmol/L    Potassium 2.7 (LL) 3.5 - 5.1 mmol/L    Chloride 83 (L) 97 - 108 mmol/L    CO2 35 (H) 21 - 32 mmol/L    Anion gap 11 5 - 15 mmol/L    Glucose 117 (H) 65 - 100 mg/dL    BUN 11 6 - 20 MG/DL    Creatinine 0.71 0.55 - 1.02 MG/DL    BUN/Creatinine ratio 15 12 - 20      eGFR >60 >60 ml/min/1.73m2    Calcium 8.8 8.5 - 10.1 MG/DL   MONONUCLEOSIS SCREEN    Collection Time: 12/07/22  5:02 AM   Result Value Ref Range    Mononucleosis screen Negative NEG     CK    Collection Time: 12/07/22  5:02 AM   Result Value Ref Range    CK 28 26 - 192 U/L   TROPONIN-HIGH SENSITIVITY    Collection Time: 12/07/22  5:02 AM   Result Value Ref Range    Troponin-High Sensitivity 14 0 - 51 ng/L   MAGNESIUM    Collection Time: 12/07/22  5:02 AM   Result Value Ref Range    Magnesium 2.0 1.6 - 2.4 mg/dL   EKG, 12 LEAD, INITIAL    Collection Time: 12/07/22  5:04 AM   Result Value Ref Range    Ventricular Rate 61 BPM    Atrial Rate 61 BPM    P-R Interval 146 ms    QRS Duration 104 ms    Q-T Interval 470 ms    QTC Calculation (Bezet) 473 ms    Calculated P Axis 24 degrees    Calculated R Axis -7 degrees    Calculated T Axis 35 degrees    Diagnosis       Normal sinus rhythm  Incomplete right bundle branch block  Minimal voltage criteria for LVH, may be normal variant  Nonspecific T wave abnormality  Prolonged QT  When compared with ECG of 09-MAY-2013 11:26,  No significant change was found         IMAGING RESULTS:  I have personally reviewed the imaging reports  EXAM: CT ABD PELV W CONT     INDICATION: fever, n/v     COMPARISON: None      CONTRAST: 100 mL of Isovue-370. ORAL CONTRAST: None     TECHNIQUE:   Following the uneventful intravenous administration of contrast, thin axial  images were obtained through the abdomen and pelvis. Coronal and sagittal  reconstructions were generated. CT dose reduction was achieved through use of a  standardized protocol tailored for this examination and automatic exposure  control for dose modulation. FINDINGS:   LOWER THORAX: No significant abnormality in the incidentally imaged lower chest.  LIVER: Numerous mass lesions, the largest of which measures 7.6 cm in the right  hepatic lobe. BILIARY TREE: Gallbladder is within normal limits. CBD is not dilated. SPLEEN: within normal limits. PANCREAS: No mass or ductal dilatation. ADRENALS: Unremarkable. KIDNEYS: No mass, calculus, or hydronephrosis. STOMACH: Unremarkable. SMALL BOWEL: No dilatation or wall thickening. COLON: Sigmoid diverticulosis. Short segment wall thickening sigmoid colon with  mild associated inflammatory change compatible with acute diverticulitis. No  fluid collection to suggest abscess. 2.2 cm lipoma is seen within the transverse  colon.   APPENDIX: Within normal limits. PERITONEUM: No ascites or pneumoperitoneum. RETROPERITONEUM: No lymphadenopathy or aortic aneurysm. REPRODUCTIVE ORGANS: Unremarkable. URINARY BLADDER: No mass or calculus. BONES: No destructive bone lesion. ABDOMINAL WALL: No mass or hernia. ADDITIONAL COMMENTS: N/A     IMPRESSION  Mild sigmoid diverticulitis. Multiple mass lesions are noted within the liver  compatible with metastatic disease.     -      Total time spent with patient: 50 minutes ________________________________________________________________________  Care Plan discussed with:  Patient y   Family     RN y              Consultant:       CT  12/7/2022:  ________________________________________________________________________    ___________________________________________________  Consulting Provider:  Heather Najera NP      12/7/2022  1:51 PM

## 2022-12-07 NOTE — DISCHARGE INSTRUCTIONS
It was a pleasure taking care of you in our Emergency Department today. We know that when you come to [facility], you are entrusting us with your health, comfort, and safety. Our physicians and nurses honor that trust, and truly appreciate the opportunity to care for you and your loved ones. We also value your feedback. If you receive a survey about your Emergency Department experience today, please fill it out. We care about our patients' feedback, and we listen to what you have to say. Thank you!       Dr. Sagar Garner MD.

## 2022-12-07 NOTE — ED PROVIDER NOTES
EMERGENCY DEPARTMENT HISTORY AND PHYSICAL EXAM     ------------------------------------------------------------------------------------------------------  Please note that this dictation was completed with [employer], the [employer] voice recognition software. Quite often unanticipated grammatical, syntax, homophones, and other interpretive errors are inadvertently transcribed by the computer software. Please disregard these errors. Please excuse any errors that have escaped final proofreading.  -----------------------------------------------------------------------------------------------------------------    Date: 12/7/2022  Patient Name: Lashon Campbell    History of Presenting Illness     Chief Complaint   Patient presents with    Flu Like Symptoms     Pt ambulatory into triage with c/o body aches, fevers, and vomiting. Symptoms ongoing x 2 weeks. Pt has not been eating due to not being able to keep things down. History Provided By: Patient    HPI: Lashon Campbell is a 63 y.o. female, with significant pmhx of,cholesterol who presents via private vehicle  to the ED with URI type symptoms with nausea and vomiting for the last week. Patient with all of her body aches not improved with previously taken over-the-counter medications although has not been drinking last 2 days. Patient expresses concern due to lack of mobility in the last few days. Pt also specifically denies any recent fevers, chills, CP,  diarrhea, abd pain, changes in BM, urinary sxs, or headache. PCP: Andrew Godwin MD    Social Hx: +tobacco, denies EtOH, denies recreational/ Illicit Drugs     There are no other complaints, changes, or physical findings at this time.      Allergies   Allergen Reactions    Erythromycin Other (comments)     \"Chest Pain\"    Pcn [Penicillins] Rash    Sulfa (Sulfonamide Antibiotics) Rash         Current Facility-Administered Medications   Medication Dose Route Frequency Provider Last Rate Last Admin cefTRIAXone (ROCEPHIN) 500 mg injection             potassium chloride in water 10 mEq/100 mL IVPB premix pgbk              Current Outpatient Medications   Medication Sig Dispense Refill    ondansetron hcl (Zofran) 4 mg tablet Take 1 Tablet by mouth every eight (8) hours as needed for Nausea. 20 Tablet 0    naproxen (Naprosyn) 500 mg tablet Take 1 Tablet by mouth two (2) times daily (with meals) for 10 days. 20 Tablet 0    ibuprofen (MOTRIN) 600 mg tablet Take 1 Tablet by mouth every six (6) hours as needed for Pain. 20 Tablet 0    hydrochlorothiazide (HYDRODIURIL) 25 mg tablet Take 25 mg by mouth daily. Past History     Past Medical History:  Past Medical History:   Diagnosis Date    High cholesterol     Infectious disease     Other ill-defined conditions(129.89)     fluid build up in chest       Past Surgical History:  Past Surgical History:   Procedure Laterality Date    HX BREAST BIOPSY         Family History:  No family history on file. Social History:  Social History     Tobacco Use    Smoking status: Every Day     Packs/day: 0.50     Types: Cigarettes   Substance Use Topics    Alcohol use: Yes     Comment: about 4-6 beers per week    Drug use: Yes     Types: Marijuana     Comment: pt somke [facility] yesterday       Allergies: Allergies   Allergen Reactions    Erythromycin Other (comments)     \"Chest Pain\"    Pcn [Penicillins] Rash    Sulfa (Sulfonamide Antibiotics) Rash         Review of Systems   Review of Systems   Constitutional: Negative. Negative for fever. Eyes: Negative. Respiratory:  Positive for shortness of breath. Cardiovascular:  Negative for chest pain. Gastrointestinal:  Positive for nausea and vomiting. Negative for abdominal pain. Endocrine: Negative. Genitourinary: Negative. Negative for difficulty urinating, dysuria and hematuria. Musculoskeletal:  Positive for myalgias. Skin: Negative. Neurological: Negative.     Psychiatric/Behavioral:  Negative for suicidal ideas. Physical Exam   Physical Exam  Vitals and nursing note reviewed. Constitutional:       General: She is not in acute distress. Appearance: She is well-developed. She is ill-appearing. She is not toxic-appearing or diaphoretic. HENT:      Head: Normocephalic and atraumatic. Nose: Nose normal.   Eyes:      General: No scleral icterus. Conjunctiva/sclera: Conjunctivae normal.   Neck:      Trachea: No tracheal deviation. Cardiovascular:      Rate and Rhythm: Regular rhythm. Tachycardia present. Heart sounds: Normal heart sounds. No murmur heard. No friction rub. Pulmonary:      Effort: Pulmonary effort is normal. No respiratory distress. Breath sounds: Normal breath sounds. No stridor. No wheezing or rales. Abdominal:      General: Bowel sounds are normal. There is no distension. Palpations: Abdomen is soft. Tenderness: There is no abdominal tenderness. Musculoskeletal:         General: No tenderness. Normal range of motion. Cervical back: Normal range of motion. Skin:     General: Skin is warm and dry. Findings: No rash. Neurological:      Mental Status: She is alert and oriented to person, place, and time. Cranial Nerves: No cranial nerve deficit. Psychiatric:         Behavior: Behavior normal.         Thought Content:  Thought content normal.         Judgment: Judgment normal.         Diagnostic Study Results     Labs -     Recent Results (from the past 12 hour(s))   INFLUENZA A+B VIRAL AGS    Collection Time: 12/07/22 12:40 AM   Result Value Ref Range    Influenza A Antigen Negative NEG      Influenza B Antigen Negative NEG     COVID-19 RAPID TEST    Collection Time: 12/07/22 12:40 AM   Result Value Ref Range    Specimen source Nasopharyngeal      COVID-19 rapid test Not detected NOTD     CBC WITH AUTOMATED DIFF    Collection Time: 12/07/22  1:14 AM   Result Value Ref Range    WBC 21.4 (H) 3.6 - 11.0 K/uL    RBC 4.23 3.80 - 5.20 M/uL    HGB 13.3 11.5 - 16.0 g/dL    HCT 37.2 35.0 - 47.0 %    MCV 87.9 80.0 - 99.0 FL    MCH 31.4 26.0 - 34.0 PG    MCHC 35.8 30.0 - 36.5 g/dL    RDW 13.2 11.5 - 14.5 %    PLATELET 183 317 - 189 K/uL    MPV 10.6 8.9 - 12.9 FL    NRBC 0.0 0  WBC    ABSOLUTE NRBC 0.00 0.00 - 0.01 K/uL    NEUTROPHILS 95 (H) 32 - 75 %    BAND NEUTROPHILS 1 %    LYMPHOCYTES 3 (L) 12 - 49 %    MONOCYTES 1 (L) 5 - 13 %    EOSINOPHILS 0 0 - 7 %    BASOPHILS 0 0 - 1 %    IMMATURE GRANULOCYTES 0 0.0 - 0.5 %    ABS. NEUTROPHILS 20.6 (H) 1.8 - 8.0 K/UL    ABS. LYMPHOCYTES 0.6 (L) 0.8 - 3.5 K/UL    ABS. MONOCYTES 0.2 0.0 - 1.0 K/UL    ABS. EOSINOPHILS 0.0 0.0 - 0.4 K/UL    ABS. BASOPHILS 0.0 0.0 - 0.1 K/UL    ABS. IMM. GRANS. 0.0 0.00 - 0.04 K/UL    DF MANUAL      RBC COMMENTS NORMOCYTIC, NORMOCHROMIC     METABOLIC PANEL, COMPREHENSIVE    Collection Time: 12/07/22  1:14 AM   Result Value Ref Range    Sodium 126 (L) 136 - 145 mmol/L    Potassium 2.6 (LL) 3.5 - 5.1 mmol/L    Chloride 82 (L) 97 - 108 mmol/L    CO2 35 (H) 21 - 32 mmol/L    Anion gap 9 5 - 15 mmol/L    Glucose 123 (H) 65 - 100 mg/dL    BUN 10 6 - 20 MG/DL    Creatinine 0.68 0.55 - 1.02 MG/DL    BUN/Creatinine ratio 15 12 - 20      eGFR >60 >60 ml/min/1.73m2    Calcium 9.0 8.5 - 10.1 MG/DL    Bilirubin, total 0.9 0.2 - 1.0 MG/DL    ALT (SGPT) 102 (H) 12 - 78 U/L    AST (SGOT) 64 (H) 15 - 37 U/L    Alk.  phosphatase 169 (H) 45 - 117 U/L    Protein, total 7.0 6.4 - 8.2 g/dL    Albumin 2.2 (L) 3.5 - 5.0 g/dL    Globulin 4.8 (H) 2.0 - 4.0 g/dL    A-G Ratio 0.5 (L) 1.1 - 2.2     URINALYSIS W/ REFLEX CULTURE    Collection Time: 12/07/22  1:14 AM    Specimen: Urine   Result Value Ref Range    Color YELLOW/STRAW      Appearance CLEAR CLEAR      Specific gravity 1.005      pH (UA) 7.0 5.0 - 8.0      Protein TRACE (A) NEG mg/dL    Glucose Negative NEG mg/dL    Ketone Negative NEG mg/dL    Bilirubin Negative NEG      Blood SMALL (A) NEG      Urobilinogen 1.0 0.2 - 1.0 EU/dL    Nitrites Negative NEG      Leukocyte Esterase TRACE (A) NEG      WBC 0-4 0 - 4 /hpf    RBC 5-10 0 - 5 /hpf    Epithelial cells FEW FEW /lpf    Bacteria 1+ (A) NEG /hpf    UA:UC IF INDICATED CULTURE NOT INDICATED BY UA RESULT CNI     POC LACTIC ACID    Collection Time: 12/07/22  1:54 AM   Result Value Ref Range    Lactic Acid (POC) 0.57 0.40 - 2.00 mmol/L       Radiologic Studies -   XR CHEST PA LAT   Final Result      Normal PA and lateral chest views. CT ABD PELV W CONT    (Results Pending)     CT Results  (Last 48 hours)      None          CXR Results  (Last 48 hours)                 12/07/22 0322  XR CHEST PA LAT Final result    Impression:      Normal PA and lateral chest views. Narrative:  EXAM: XR CHEST PA LAT       INDICATION: Myalgia       COMPARISON: None       TECHNIQUE: PA and lateral chest views       FINDINGS: The cardiac size is within normal limits. The pulmonary vasculature is   within normal limits. The lungs and pleural spaces are clear. The visualized bones and upper abdomen   are age-appropriate. Medical Decision Making   I am the first provider for this patient. I reviewed the vital signs, available nursing notes, past medical history, past surgical history, family history and social history. Vital Signs-Reviewed the patient's vital signs. Patient Vitals for the past 12 hrs:   Temp Pulse BP SpO2   12/07/22 0038 98.8 °F (37.1 °C) (!) 110 116/79 95 %       Pulse Oximetry Analysis - 95% on RA Normal    Records Reviewed/Interpretted: Nursing Notes from triage and Old Medical Records previous evaluation for snakebite in 2021    Provider Notes (Medical Decision Making):     DDX:  Covid, Flu, pna, uti    Plan:  COVID swab, flu swab, chest x-ray, UA, labs, IV fluids, analgesic    Impression:  Diverticulosis, liver masses, hypokalemia, hypnatremia    ED Course:   Initial assessment performed.  The patients presenting problems have been discussed, and they are in agreement with the care plan formulated and outlined with them. I have encouraged them to ask questions as they arise throughout their visit. I reviewed our electronic medical record system for any past medical records that were available that may contribute to the patients current condition, the nursing notes and and vital signs from today's visit  Nursing notes will be reviewed as they become available in realtime while the pt has been in the ED. Dane Chacon MD        TOBACCO COUNSELING:  During evaluation pt reported that they are a current tobacco user. I have spent 3 minutes discussing the medical risks of prolonged smoking habits and advised the patient of the benefits of the cessation of smoking, providing specific suggestions on how to quit. Pt has been counseled and encouraged to quit as soon as possible in order to decrease further risks to their health. Pt has conveyed their understanding of the risks involved should they continue to use tobacco products. Dane Chacon MD    I personally reviewed/interpreted pt's imaging. Agree with official read by radiology as noted above. Dane Chacon MD    PROGRESS NOTE  3:10 AM  Patient had temperature rechecked and notably febrile. Have ordered antipyretic, lactate, cultures and ceftriaxone. Awaiting chest x-ray. Noted to be negative for flu and COVID. Urine noninfectious appearing    PROGRESS NOTE  5:02 AM  Patient noted to feel much improved, no longer nauseous or having body aches. Danny results thus far including negative flu and COVID swabs, chest x-ray without pneumonia and urine that does not appear infectious. Noted plan for further evaluation with CT scan and repeat labs to include lipase.    5:02 AM  Patient's presentation, labs/imaging and plan of care was reviewed with Dr. Kit Juarez as part of sign out. They will follow up on repeat labs and ct scan as part of the plan discussed with the patient.     Dr. Michael Pillai assistance in completion of this plan is greatly appreciated but it should be noted that I will be the provider of record for this patient. Natalie Curry MD      ED Course as of 12/08/22 0704   Wed Dec 07, 2022   0506 EKG at 5:04 AM on 12/7/2022 interpreted by me: Normal sinus rhythm, 61 bpm, normal axis, normal NJ, QRS intervals, prolonged QTc interval, incomplete right bundle branch block, nonspecific ST changes [AO]   7465 Received signout from Dr. Alessia Del Valle at approximately 5:30 AM awaiting results of CT scan and repeat lab work. CT shows mild acute sigmoid diverticulitis and multiple masses in the liver consistent with possible metastases. I discussed CT findings with patient and her daughter who was present in the room. On repeat labs, patient still hyponatremic and hypokalemic, although improved. Case discussed with Dr. John Cates (hospitalist) who will see and admit the patient. [AO]      ED Course User Index  [AO] Ivett Callejas MD         Critical Care Time:     none      Diagnosis     Clinical Impression:   1. Diverticulitis of sigmoid colon    2. Liver masses    3. Hypokalemia    4. Hyponatremia    5. Nausea and vomiting, unspecified vomiting type        PLAN:  1. Admit to hospitalist        Downtime notation:    Pt noted to receive care in the ED during monthly downtime. Imaging and lab work may not be readily available in EMR. Hard copy print outs of labs have been reviewed, imaging results (via \"sticky notes\" from Radiologist) have also been reviewed.   Natalie Curry MD

## 2022-12-07 NOTE — ED NOTES
ADMISSION SBAR NOTE    SITUATION/BACKGROUND:    Patient is being transferred to [facility], Room# [phone]    Patient's Chief Complaint was Flu like symptoms and is admitted for diverticulitis of sigmoid colon, liver masses, hypokalemia, hyponatremia, nausea and vomiting. CODE STATUS: Full Code    ISOLATION/PRECAUTIONS: Yes  ISOLATION TYPE: Standard    Called outstanding consults: Yes     Are there still sign and held orders that need to be released? No     STAT labs collected: Yes  REPEAT LACTIC ACID DUE? No  TIME DUE:     All STAT orders are complete: Yes    The following personal items will be sent with the patient during transfer to the floor: All valuables:   ITEM:    ITEM:    ITEM:    ITEM:    ITEM:         ASSESSMENT:    NEURO: NIH SCORE:      MARGIE SCREENING:        NEURO ASSESSMENT:      Is patient impulsive? No   Is patient oriented? Yes   Do they follow commands? Yes  Is the patient ambulatory? Yes Device need none    FALL RISK? No    INTERVENTIONS: n/a    RESPIRATORY: Is patient on Oxygen? No    OXYGEN:      CARDIAC: Is cardiac monitoring ordered? Yes Last Rhythm: NSR  Patient to transfer with tele box on? Yes    LINE ACCESS:   Peripheral IV Left Antecubital (Active)        /GI: CONTINENT BOWEL/BLADDER? Yes  URINARY OUTPUT: voiding  CHRONIC OR ACUTE? If CHRONIC, is it 1days old, was it changed prior to specimen collection? n/a  WAS UA WITH REFLEX SENT TO LAB? n/a IF NO, COLLECT AND SEND PRIOR TO TRANSPORT TO INPATIENT AREA    INTEGUMENTARY:   IS THE PATIENT UNDRESSED? Yes  ARE THERE WOUNDS PRESENT? No   ARE THE WOUNDS DOCUMENTED? none    RESTRAINTS IN USE: No      IS DOCUMENTATION COMPLETE: n/a  Is there a current Order?  No  When does it ? n/a    Vital Signs  Temp: 97.7 °F (36.5 °C) (22 0513)  Pulse (Heart Rate): 64 (22 0513)  BP: 97/85 (22 1500)  MAP (Monitor): 90 (22 1500)  MAP (Calculated): 89 (22 1500)  Pain 1  Pain Scale 1: Numeric (0 - 10) (12/07/22 0110)  Pain Intensity 1: 0 (12/07/22 3893)      REVIEW:    IP UNIT CALLED NOTE IS READY: Yes Spoke to Thena More  IF there are questions Call Shravan Goldman at phone # [phone]

## 2022-12-08 ENCOUNTER — ANESTHESIA EVENT (OUTPATIENT)
Dept: ENDOSCOPY | Age: 62
DRG: 392 | End: 2022-12-08
Payer: COMMERCIAL

## 2022-12-08 LAB
ALBUMIN SERPL-MCNC: 2 G/DL (ref 3.5–5)
ALBUMIN/GLOB SERPL: 0.5 {RATIO} (ref 1.1–2.2)
ALP SERPL-CCNC: 133 U/L (ref 45–117)
ALT SERPL-CCNC: 68 U/L (ref 12–78)
ANION GAP SERPL CALC-SCNC: 7 MMOL/L (ref 5–15)
ANION GAP SERPL CALC-SCNC: 7 MMOL/L (ref 5–15)
AST SERPL-CCNC: 30 U/L (ref 15–37)
BASOPHILS # BLD: 0 K/UL (ref 0–0.1)
BASOPHILS NFR BLD: 0 % (ref 0–1)
BILIRUB SERPL-MCNC: 0.5 MG/DL (ref 0.2–1)
BUN SERPL-MCNC: 6 MG/DL (ref 6–20)
BUN SERPL-MCNC: 7 MG/DL (ref 6–20)
BUN/CREAT SERPL: 10 (ref 12–20)
BUN/CREAT SERPL: 13 (ref 12–20)
CALCIUM SERPL-MCNC: 8.4 MG/DL (ref 8.5–10.1)
CALCIUM SERPL-MCNC: 8.6 MG/DL (ref 8.5–10.1)
CHLORIDE SERPL-SCNC: 90 MMOL/L (ref 97–108)
CHLORIDE SERPL-SCNC: 94 MMOL/L (ref 97–108)
CO2 SERPL-SCNC: 33 MMOL/L (ref 21–32)
CO2 SERPL-SCNC: 34 MMOL/L (ref 21–32)
CREAT SERPL-MCNC: 0.54 MG/DL (ref 0.55–1.02)
CREAT SERPL-MCNC: 0.58 MG/DL (ref 0.55–1.02)
DIFFERENTIAL METHOD BLD: ABNORMAL
EOSINOPHIL # BLD: 0 K/UL (ref 0–0.4)
EOSINOPHIL NFR BLD: 0 % (ref 0–7)
ERYTHROCYTE [DISTWIDTH] IN BLOOD BY AUTOMATED COUNT: 13.6 % (ref 11.5–14.5)
GLOBULIN SER CALC-MCNC: 4.2 G/DL (ref 2–4)
GLUCOSE SERPL-MCNC: 121 MG/DL (ref 65–100)
GLUCOSE SERPL-MCNC: 138 MG/DL (ref 65–100)
HCT VFR BLD AUTO: 35 % (ref 35–47)
HGB BLD-MCNC: 12 G/DL (ref 11.5–16)
IMM GRANULOCYTES # BLD AUTO: 0 K/UL (ref 0–0.04)
IMM GRANULOCYTES NFR BLD AUTO: 0 % (ref 0–0.5)
INR PPP: 1.2 (ref 0.9–1.1)
LACTATE SERPL-SCNC: 1.1 MMOL/L (ref 0.4–2)
LYMPHOCYTES # BLD: 2.2 K/UL (ref 0.8–3.5)
LYMPHOCYTES NFR BLD: 10 % (ref 12–49)
MAGNESIUM SERPL-MCNC: 1.8 MG/DL (ref 1.6–2.4)
MCH RBC QN AUTO: 31 PG (ref 26–34)
MCHC RBC AUTO-ENTMCNC: 34.3 G/DL (ref 30–36.5)
MCV RBC AUTO: 90.4 FL (ref 80–99)
MONOCYTES # BLD: 1.3 K/UL (ref 0–1)
MONOCYTES NFR BLD: 6 % (ref 5–13)
NEUTS SEG # BLD: 18.7 K/UL (ref 1.8–8)
NEUTS SEG NFR BLD: 84 % (ref 32–75)
NRBC # BLD: 0 K/UL (ref 0–0.01)
NRBC BLD-RTO: 0 PER 100 WBC
PLATELET # BLD AUTO: 350 K/UL (ref 150–400)
PMV BLD AUTO: 10.8 FL (ref 8.9–12.9)
POTASSIUM SERPL-SCNC: 2.2 MMOL/L (ref 3.5–5.1)
POTASSIUM SERPL-SCNC: 3 MMOL/L (ref 3.5–5.1)
PROCALCITONIN SERPL-MCNC: 1.14 NG/ML
PROT SERPL-MCNC: 6.2 G/DL (ref 6.4–8.2)
PROTHROMBIN TIME: 12.4 SEC (ref 9–11.1)
RBC # BLD AUTO: 3.87 M/UL (ref 3.8–5.2)
RBC MORPH BLD: ABNORMAL
SODIUM SERPL-SCNC: 131 MMOL/L (ref 136–145)
SODIUM SERPL-SCNC: 134 MMOL/L (ref 136–145)
WBC # BLD AUTO: 22.2 K/UL (ref 3.6–11)

## 2022-12-08 PROCEDURE — 74011250636 HC RX REV CODE- 250/636: Performed by: EMERGENCY MEDICINE

## 2022-12-08 PROCEDURE — 99223 1ST HOSP IP/OBS HIGH 75: CPT | Performed by: INTERNAL MEDICINE

## 2022-12-08 PROCEDURE — 74011000250 HC RX REV CODE- 250: Performed by: INTERNAL MEDICINE

## 2022-12-08 PROCEDURE — 74011000250 HC RX REV CODE- 250: Performed by: NURSE PRACTITIONER

## 2022-12-08 PROCEDURE — 74011250636 HC RX REV CODE- 250/636: Performed by: PHYSICIAN ASSISTANT

## 2022-12-08 PROCEDURE — 97116 GAIT TRAINING THERAPY: CPT | Performed by: PHYSICAL THERAPIST

## 2022-12-08 PROCEDURE — 74011250636 HC RX REV CODE- 250/636

## 2022-12-08 PROCEDURE — 94760 N-INVAS EAR/PLS OXIMETRY 1: CPT

## 2022-12-08 PROCEDURE — 74011250637 HC RX REV CODE- 250/637: Performed by: INTERNAL MEDICINE

## 2022-12-08 PROCEDURE — 82105 ALPHA-FETOPROTEIN SERUM: CPT

## 2022-12-08 PROCEDURE — 83605 ASSAY OF LACTIC ACID: CPT

## 2022-12-08 PROCEDURE — 65270000046 HC RM TELEMETRY

## 2022-12-08 PROCEDURE — 85025 COMPLETE CBC W/AUTO DIFF WBC: CPT

## 2022-12-08 PROCEDURE — 97161 PT EVAL LOW COMPLEX 20 MIN: CPT | Performed by: PHYSICAL THERAPIST

## 2022-12-08 PROCEDURE — 83735 ASSAY OF MAGNESIUM: CPT

## 2022-12-08 PROCEDURE — 74011250636 HC RX REV CODE- 250/636: Performed by: INTERNAL MEDICINE

## 2022-12-08 PROCEDURE — 86301 IMMUNOASSAY TUMOR CA 19-9: CPT

## 2022-12-08 PROCEDURE — 80053 COMPREHEN METABOLIC PANEL: CPT

## 2022-12-08 PROCEDURE — 74011250637 HC RX REV CODE- 250/637

## 2022-12-08 PROCEDURE — 84145 PROCALCITONIN (PCT): CPT

## 2022-12-08 PROCEDURE — 36415 COLL VENOUS BLD VENIPUNCTURE: CPT

## 2022-12-08 PROCEDURE — 85610 PROTHROMBIN TIME: CPT

## 2022-12-08 RX ORDER — POTASSIUM CHLORIDE 7.45 MG/ML
10 INJECTION INTRAVENOUS ONCE
Status: COMPLETED | OUTPATIENT
Start: 2022-12-08 | End: 2022-12-08

## 2022-12-08 RX ORDER — MAGNESIUM SULFATE HEPTAHYDRATE 40 MG/ML
2 INJECTION, SOLUTION INTRAVENOUS ONCE
Status: COMPLETED | OUTPATIENT
Start: 2022-12-08 | End: 2022-12-08

## 2022-12-08 RX ORDER — POTASSIUM CHLORIDE 7.45 MG/ML
10 INJECTION INTRAVENOUS
Status: DISPENSED | OUTPATIENT
Start: 2022-12-08 | End: 2022-12-08

## 2022-12-08 RX ORDER — SODIUM CHLORIDE AND POTASSIUM CHLORIDE 150; 900 MG/100ML; MG/100ML
INJECTION, SOLUTION INTRAVENOUS CONTINUOUS
Status: DISCONTINUED | OUTPATIENT
Start: 2022-12-08 | End: 2022-12-10 | Stop reason: HOSPADM

## 2022-12-08 RX ORDER — POTASSIUM CHLORIDE 20 MEQ/1
40 TABLET, EXTENDED RELEASE ORAL
Status: COMPLETED | OUTPATIENT
Start: 2022-12-08 | End: 2022-12-08

## 2022-12-08 RX ORDER — LORAZEPAM 1 MG/1
2 TABLET ORAL
Status: DISCONTINUED | OUTPATIENT
Start: 2022-12-08 | End: 2022-12-09

## 2022-12-08 RX ORDER — POTASSIUM CHLORIDE 7.45 MG/ML
10 INJECTION INTRAVENOUS
Status: ACTIVE | OUTPATIENT
Start: 2022-12-08 | End: 2022-12-08

## 2022-12-08 RX ORDER — POTASSIUM CHLORIDE 7.45 MG/ML
10 INJECTION INTRAVENOUS
Status: DISCONTINUED | OUTPATIENT
Start: 2022-12-08 | End: 2022-12-08

## 2022-12-08 RX ADMIN — POTASSIUM CHLORIDE 10 MEQ: 7.46 INJECTION, SOLUTION INTRAVENOUS at 08:29

## 2022-12-08 RX ADMIN — MAGNESIUM SULFATE HEPTAHYDRATE 2 G: 40 INJECTION, SOLUTION INTRAVENOUS at 16:47

## 2022-12-08 RX ADMIN — POTASSIUM CHLORIDE 10 MEQ: 7.46 INJECTION, SOLUTION INTRAVENOUS at 11:16

## 2022-12-08 RX ADMIN — FOLIC ACID 1 MG: 1 TABLET ORAL at 09:03

## 2022-12-08 RX ADMIN — POLYETHYLENE GLYCOL-3350 AND ELECTROLYTES 4000 ML: 236; 6.74; 5.86; 2.97; 22.74 POWDER, FOR SOLUTION ORAL at 17:00

## 2022-12-08 RX ADMIN — POTASSIUM CHLORIDE 10 MEQ: 7.46 INJECTION, SOLUTION INTRAVENOUS at 19:54

## 2022-12-08 RX ADMIN — METRONIDAZOLE 500 MG: 500 INJECTION, SOLUTION INTRAVENOUS at 05:59

## 2022-12-08 RX ADMIN — Medication 1 TABLET: at 09:03

## 2022-12-08 RX ADMIN — ACETAMINOPHEN 650 MG: 325 TABLET ORAL at 15:31

## 2022-12-08 RX ADMIN — METRONIDAZOLE 500 MG: 500 INJECTION, SOLUTION INTRAVENOUS at 15:05

## 2022-12-08 RX ADMIN — SODIUM CHLORIDE, PRESERVATIVE FREE 10 ML: 5 INJECTION INTRAVENOUS at 21:16

## 2022-12-08 RX ADMIN — POTASSIUM CHLORIDE 40 MEQ: 1500 TABLET, EXTENDED RELEASE ORAL at 09:03

## 2022-12-08 RX ADMIN — SODIUM CHLORIDE, PRESERVATIVE FREE 10 ML: 5 INJECTION INTRAVENOUS at 15:05

## 2022-12-08 RX ADMIN — POTASSIUM CHLORIDE AND SODIUM CHLORIDE: 900; 150 INJECTION, SOLUTION INTRAVENOUS at 08:58

## 2022-12-08 RX ADMIN — SODIUM CHLORIDE, PRESERVATIVE FREE 10 ML: 5 INJECTION INTRAVENOUS at 06:09

## 2022-12-08 RX ADMIN — POTASSIUM CHLORIDE AND SODIUM CHLORIDE: 900; 150 INJECTION, SOLUTION INTRAVENOUS at 19:56

## 2022-12-08 RX ADMIN — LORAZEPAM 2 MG: 1 TABLET ORAL at 22:39

## 2022-12-08 RX ADMIN — POTASSIUM CHLORIDE 10 MEQ: 10 INJECTION, SOLUTION INTRAVENOUS at 16:50

## 2022-12-08 RX ADMIN — THIAMINE HCL TAB 100 MG 100 MG: 100 TAB at 09:03

## 2022-12-08 RX ADMIN — POTASSIUM CHLORIDE 10 MEQ: 7.46 INJECTION, SOLUTION INTRAVENOUS at 06:29

## 2022-12-08 RX ADMIN — ONDANSETRON 4 MG: 2 INJECTION INTRAMUSCULAR; INTRAVENOUS at 17:05

## 2022-12-08 RX ADMIN — POTASSIUM CHLORIDE 40 MEQ: 1500 TABLET, EXTENDED RELEASE ORAL at 16:51

## 2022-12-08 RX ADMIN — LEVOFLOXACIN 750 MG: 5 INJECTION, SOLUTION INTRAVENOUS at 05:59

## 2022-12-08 RX ADMIN — POTASSIUM CHLORIDE 10 MEQ: 7.46 INJECTION, SOLUTION INTRAVENOUS at 06:32

## 2022-12-08 RX ADMIN — METRONIDAZOLE 500 MG: 500 INJECTION, SOLUTION INTRAVENOUS at 21:15

## 2022-12-08 NOTE — CONSULTS
[address] [facility] [address], [address] [facility], [address] [place] [address]  [phone]        Oncology Note        Patient: Ashely Antunez MRN: [id number]  SSN: [id number]    YOB: 1960  Age: 63 y.o. Sex: female      Subjective:      Ashely Antunez is a 63 y.o. female who I am seeing in consultation for what seems like metastatic carcinoma. She has been feeling poorly for over 4 weeks. She came to the ED. CT showed metastatic cancer in the liver. The last colonoscopy was 10 yrs ago. She denies weight loss. Some rectal bleeding in the hospital.       Review of Systems:    Constitutional: negative  Eyes: negative  Ears, Nose, Mouth, Throat, and Face: negative  Respiratory: negative  Cardiovascular: negative  Gastrointestinal: negative  Genitourinary:negative  Integument/Breast: negative  Hematologic/Lymphatic: negative  Musculoskeletal:negative  Neurological: negative    Past Medical History:   Diagnosis Date    High cholesterol     Infectious disease     Other ill-defined conditions(799.89)     fluid build up in chest     Past Surgical History:   Procedure Laterality Date    HX BREAST BIOPSY        History reviewed. No pertinent family history. Social History     Tobacco Use    Smoking status: Every Day     Packs/day: 0.50     Types: Cigarettes    Smokeless tobacco: Not on file   Substance Use Topics    Alcohol use: Yes     Comment: about 4-6 beers per week      Prior to Admission medications    Medication Sig Start Date End Date Taking? Authorizing Provider   ondansetron hcl (Zofran) 4 mg tablet Take 1 Tablet by mouth every eight (8) hours as needed for Nausea. 12/7/22  Yes Shilpa Stephens MD   naproxen (Naprosyn) 500 mg tablet Take 1 Tablet by mouth two (2) times daily (with meals) for 10 days.  12/7/22 12/17/22 Yes Shilpa Stephens MD   ibuprofen (MOTRIN) 600 mg tablet Take 1 Tablet by mouth every six (6) hours as needed for Pain. 11/13/21   QUINN Chen   hydrochlorothiazide (HYDRODIURIL) 25 mg tablet Take 25 mg by mouth daily. Other, MD Sayra              Allergies   Allergen Reactions    Erythromycin Other (comments)     \"Chest Pain\"    Pcn [Penicillins] Rash    Sulfa (Sulfonamide Antibiotics) Rash           Objective:     Visit Vitals  BP 96/65 (BP 1 Location: Left upper arm, BP Patient Position: At rest;Sitting)   Pulse 91   Temp 100.4 °F (38 °C)   Resp 17   Ht 5' 4\" (1.626 m)   Wt 170 lb (77.1 kg)   SpO2 97%   Breastfeeding No   BMI 29.18 kg/m²       Physical Exam:  GENERAL: alert, cooperative, no distress, appears stated age  EYE: conjunctivae/corneas clear. PERRL, EOM's intact  LYMPHATIC: Cervical, supraclavicular, and axillary nodes normal.   THROAT & NECK: normal and no erythema or exudates noted. LUNG: clear to auscultation bilaterally  HEART: regular rate and rhythm, S1, S2 normal, no murmur, click, rub or gallop  ABDOMEN: soft, non-tender. Bowel sounds normal. No masses,  no organomegaly  EXTREMITIES:  extremities normal, atraumatic, no cyanosis or edema  SKIN: no rash or abnormalities  NEUROLOGIC: AOx3. Gait normal. Reflexes and motor strength normal and symmetric. Cranial nerves 2-12 and sensation grossly intact. CT Results (most recent):  Results from Hospital Encounter encounter on 12/07/22    CT CHEST W CONT    Narrative  INDICATION: liver mass, ?lung pathology    COMPARISON: 12/7/2022    TECHNIQUE:  Following the uneventful intravenous administration of 100 cc  Isovue-300, 5 mm axial images were obtained through the chest. Coronal and  sagittal reformats were generated. CT dose reduction was achieved through use  of a standardized protocol tailored for this examination and automatic exposure  control for dose modulation. FINDINGS:    CHEST WALL: No axillary or supraclavicular adenopathy.  A millimeter nodule in  the left breast. Possible 6 mm nodule in the right breast.  THYROID: No nodule. MEDIASTINUM: No mass or lymphadenopathy. JAMIE: No mass or lymphadenopathy. THORACIC AORTA: Dilated ascending aorta measuring 4.6 cm. MAIN PULMONARY ARTERY: Normal in caliber. TRACHEA/BRONCHI: Patent. ESOPHAGUS: No wall thickening or dilatation. HEART: Normal in size. PLEURA: No effusion or pneumothorax. LUNGS: 4 mm groundglass nodule right upper lobe series 4 image 25. 3 mm  groundglass nodule lingula series 4 image 64  INCIDENTALLY IMAGED UPPER ABDOMEN: Multiple liver masses are again noted. BONES: No destructive bone lesion. Lucent lesion in T3 likely hemangioma    Impression  1. Small nonspecific pulmonary nodules. Attention on follow-up imaging. 2.  Aneurysmal dilatation of the ascending aorta. 3.  Multiple liver masses are again identified. Lab Results   Component Value Date/Time    WBC 22.2 (H) 12/08/2022 05:08 AM    HGB 12.0 12/08/2022 05:08 AM    HCT 35.0 12/08/2022 05:08 AM    PLATELET 267 18/31/1729 05:08 AM    MCV 90.4 12/08/2022 05:08 AM         Lab Results   Component Value Date/Time    Sodium 134 (L) 12/08/2022 02:00 PM    Potassium 3.0 (L) 12/08/2022 02:00 PM    Chloride 94 (L) 12/08/2022 02:00 PM    CO2 33 (H) 12/08/2022 02:00 PM    Anion gap 7 12/08/2022 02:00 PM    Glucose 121 (H) 12/08/2022 02:00 PM    BUN 6 12/08/2022 02:00 PM    Creatinine 0.58 12/08/2022 02:00 PM    BUN/Creatinine ratio 10 (L) 12/08/2022 02:00 PM    GFR est AA >60 11/13/2021 01:16 PM    GFR est non-AA 55 (L) 11/13/2021 01:16 PM    Calcium 8.4 (L) 12/08/2022 02:00 PM    Bilirubin, total 0.5 12/08/2022 05:08 AM    Alk. phosphatase 133 (H) 12/08/2022 05:08 AM    Protein, total 6.2 (L) 12/08/2022 05:08 AM    Albumin 2.0 (L) 12/08/2022 05:08 AM    Globulin 4.2 (H) 12/08/2022 05:08 AM    A-G Ratio 0.5 (L) 12/08/2022 05:08 AM    ALT (SGPT) 68 12/08/2022 05:08 AM    AST (SGOT) 30 12/08/2022 05:08 AM           Assessment:     1.  Metastatic carcinoma to the liver     ECOG PS 0    I spent 35 minute with the patient in a face-to-face encounter. I explained her the stage of the disease, pathophysiology of the disease and the treatment approaches. I answered all her questions. More than 50% of the time was utilized in education, counseling and co-ordination of care. Liver biopsy. Tumor marker. Plan:        1. Liver Biopsy  2. Tumor marker  3. Colonoscopy  4.  Return to the clinic for follow up      Signed By: Justin Luke MD     December 8, 2022

## 2022-12-08 NOTE — ANESTHESIA PREPROCEDURE EVALUATION
Relevant Problems   No relevant active problems       Anesthetic History   No history of anesthetic complications            Review of Systems / Medical History  Patient summary reviewed, nursing notes reviewed and pertinent labs reviewed    Pulmonary          Smoker         Neuro/Psych   Within defined limits           Cardiovascular              Hyperlipidemia    Exercise tolerance: >4 METS  Comments: RBBB   GI/Hepatic/Renal           Liver disease     Endo/Other  Within defined limits           Other Findings   Comments: 1/2 ppd smoker  Drug use: Marijuana  Liver mass, abnormal CT         Physical Exam    Airway  Mallampati: III  TM Distance: 4 - 6 cm  Neck ROM: normal range of motion   Mouth opening: Normal     Cardiovascular    Rhythm: regular  Rate: normal    Murmur, Aortic area     Dental    Dentition: Caps/crowns, Upper dentition intact and Lower dentition intact     Pulmonary  Breath sounds clear to auscultation               Abdominal  GI exam deferred       Other Findings            Anesthetic Plan    ASA: 2  Anesthesia type: general and total IV anesthesia          Induction: Intravenous  Anesthetic plan and risks discussed with: Patient

## 2022-12-08 NOTE — PROGRESS NOTES
End of Shift Note    Bedside shift change report given to CHRISTOPHER Jara (oncoming nurse) by Coby Hernadez RN (offgoing nurse). Report included the following information SBAR, Kardex, MAR, and Cardiac Rhythm Sinus Rhythm    Shift worked:  7 am to 7:30 pm     Shift summary and any significant changes:     Patient admitted from ED. Admission assessment, vitals, medications, and dual skin completed. Administered 1 dose for Tylenol for temp of 100 F; temp came down to 98.8 F. Patient denied nausea, pain, shortness of breath, and dizziness during shift. New IV placed as patient accidentally pulled out original IV; IV flushed and patent. Nursing rounds and education completed. Concerns for physician to address:       Zone phone for oncoming shift:          Activity:  Activity Level: Up with Assistance (standby IV pole)  Number times ambulated in hallways past shift: 0  Number of times OOB to chair past shift: 0    Cardiac:   Cardiac Monitoring: Yes      Cardiac Rhythm: Sinus Rhythm    Access:  Current line(s): PIV     Genitourinary:   Urinary status: voiding    Respiratory:   O2 Device: None (Room air)  Chronic home O2 use?: NO  Incentive spirometer at bedside: NO       GI:  Last Bowel Movement Date: 12/07/22 (per pt small)  Current diet:  DIET NPO  ADULT DIET Clear Liquid  Passing flatus: YES  Tolerating current diet: YES       Pain Management:   Patient states pain is manageable on current regimen: YES    Skin:  Gurmeet Score: 19  Interventions: increase time out of bed    Patient Safety:  Fall Score:  Total Score: 2  Interventions: gripper socks and pt to call before getting OOB       Length of Stay:  Expected LOS: 2d 14h  Actual LOS: CHRISTOPHER Bell

## 2022-12-08 NOTE — PROGRESS NOTES
Hospitalist Progress Note    Subjective:   Daily Progress Note: 12/8/2022 4:01 PM    Hospital Course:  Ms. Scarlet Reynoso is a 63year old female with no significant medical history who presented to ED with approx 2 week history of subjective fevers, diarrhea, vomiting. She also endorses weight loss (7 lbs in ~2 weeks), fatigue, weakness, and occasional shortness of breath. She denies any known sick contacts. In ED she tested (-) for flu, RSV, COVID19, and mononucleosis. She was afebrile but mildly tachycardic 110 on admit. Her labs were remarkable for WBC 21.4, Na 126, K 2.6, ALT 10.2, AST 64, alk phos 169. Blood cultures, UA C&S, and MRSA swab sent and pt was started on empiric levaquin and flagyl. Radiology reviewed including CT chest showing nonspecific pulmonary nodules, aneurysmal dilation of ascending aorta, and multiple liver masses; CT abd/pelvis showing mild sigmoid diverticulitis and multiple mass lesions within liver compatible with metastatic disease. GI consulted and concerned for colon malignancy over diverticulitis though they agree with abx and plan for colonoscopy on 12/9. CEA, AFP, CA 19-9 pending results. Correcting and rechecking hypokalemia/hypomagnesemia/hyponatremia including NS w/20 Kcl @ 100 mL/hr as well as PO and IV repletion. Consulting oncology for suspicion for malignancy with poss metastases. 12/8 Pt became febrile 100.4F and BP 96/65, so will check lactic and procal, already on IV abx, BC with NGTD, urine cx bacteria 1+ C&S pending. She has not had primary care in [place] years\" and does recount a colonoscopy \"a decade ago\" which had no abnormalities. She reports alcohol and tobacco cessations ~3 weeks ago, denies any illicit drug use. She lives at home independently and works from home for an [employer]. Subjective:  Pt seen sitting up in bed with family at bedside.   She states \"I didn't sleep a wink last night, I keep having to go to the bathroom for bowel movements, and then there was blood in it. \"    Current Facility-Administered Medications   Medication Dose Route Frequency    0.9% sodium chloride with KCl 20 mEq/L infusion   IntraVENous CONTINUOUS    peg 3350-electrolytes (COLYTE) 4000 mL  4,000 mL Oral ONCE    LORazepam (ATIVAN) tablet 2 mg  2 mg Oral Q4H PRN    magnesium sulfate 2 g/50 ml IVPB (premix or compounded)  2 g IntraVENous ONCE    potassium chloride (K-DUR, KLOR-CON M20) SR tablet 40 mEq  40 mEq Oral NOW    potassium chloride 10 mEq in 100 ml IVPB  10 mEq IntraVENous Q1H    metroNIDAZOLE (FLAGYL) IVPB premix 500 mg  500 mg IntraVENous Q8H    sodium chloride (NS) flush 5-40 mL  5-40 mL IntraVENous Q8H    sodium chloride (NS) flush 5-40 mL  5-40 mL IntraVENous PRN    acetaminophen (TYLENOL) tablet 650 mg  650 mg Oral Q6H PRN    Or    acetaminophen (TYLENOL) suppository 650 mg  650 mg Rectal Q6H PRN    polyethylene glycol (MIRALAX) packet 17 g  17 g Oral DAILY PRN    ondansetron (ZOFRAN ODT) tablet 4 mg  4 mg Oral Q8H PRN    Or    ondansetron (ZOFRAN) injection 4 mg  4 mg IntraVENous Q6H PRN    levoFLOXacin (LEVAQUIN) 750 mg in D5W IVPB  750 mg IntraVENous Q24H    oxyCODONE IR (ROXICODONE) tablet 5 mg  5 mg Oral Q4H PRN    melatonin tablet 3 mg  3 mg Oral QHS PRN    morphine injection 1 mg  1 mg IntraVENous Q4H PRN    diazePAM (VALIUM) injection 5 mg  5 mg IntraVENous Q1H PRN    diazePAM (VALIUM) injection 10 mg  10 mg IntraVENous U0G PRN    folic acid (FOLVITE) tablet 1 mg  1 mg Oral DAILY    thiamine mononitrate (B-1) tablet 100 mg  100 mg Oral DAILY    multivitamin, tx-iron-ca-min (THERA-M w/ IRON) tablet 1 Tablet  1 Tablet Oral DAILY    hydrALAZINE (APRESOLINE) 20 mg/mL injection 10 mg  10 mg IntraVENous Q6H PRN    polyethylene glycol (MIRALAX) packet 34 g  34 g Oral BID        Review of Systems:    Review of Systems   Constitutional:  Positive for fever, malaise/fatigue and weight loss. Negative for chills.    Respiratory:  Positive for shortness of breath. Negative for cough and sputum production. Cardiovascular:  Negative for chest pain, palpitations and leg swelling. Gastrointestinal:  Positive for blood in stool and diarrhea. Negative for abdominal pain, constipation, nausea and vomiting. Neurological:  Positive for weakness. Negative for dizziness, tingling, sensory change, focal weakness and headaches. Endo/Heme/Allergies:  Does not bruise/bleed easily. Objective:     Visit Vitals  BP 96/65 (BP 1 Location: Left upper arm, BP Patient Position: At rest;Sitting)   Pulse 91   Temp 100.4 °F (38 °C)   Resp 17   Ht 5' 4\" (1.626 m)   Wt 77.1 kg (170 lb)   SpO2 97%   Breastfeeding No   BMI 29.18 kg/m²      O2 Device: None (Room air)    Temp (24hrs), Av.7 °F (37.1 °C), Min:97.3 °F (36.3 °C), Max:100.4 °F (38 °C)      No intake/output data recorded. No intake/output data recorded. PHYSICAL EXAM:    Physical Exam  Constitutional:       Appearance: Normal appearance. HENT:      Head: Normocephalic and atraumatic. Mouth/Throat:      Mouth: Mucous membranes are moist.   Eyes:      Pupils: Pupils are equal, round, and reactive to light. Cardiovascular:      Rate and Rhythm: Normal rate and regular rhythm. Pulses: Normal pulses. Heart sounds: Normal heart sounds. Pulmonary:      Effort: Pulmonary effort is normal.      Breath sounds: Examination of the right-lower field reveals decreased breath sounds. Examination of the left-lower field reveals decreased breath sounds. Decreased breath sounds present. Abdominal:      General: Bowel sounds are normal. There is no distension. Palpations: Abdomen is soft. Tenderness: There is no abdominal tenderness. There is no guarding. Musculoskeletal:         General: Normal range of motion. Skin:     General: Skin is warm and dry. Neurological:      Mental Status: She is alert and oriented to person, place, and time.           Data Review    Recent Results (from the past 24 hour(s))   METABOLIC PANEL, COMPREHENSIVE    Collection Time: 12/08/22  5:08 AM   Result Value Ref Range    Sodium 131 (L) 136 - 145 mmol/L    Potassium 2.2 (LL) 3.5 - 5.1 mmol/L    Chloride 90 (L) 97 - 108 mmol/L    CO2 34 (H) 21 - 32 mmol/L    Anion gap 7 5 - 15 mmol/L    Glucose 138 (H) 65 - 100 mg/dL    BUN 7 6 - 20 MG/DL    Creatinine 0.54 (L) 0.55 - 1.02 MG/DL    BUN/Creatinine ratio 13 12 - 20      eGFR >60 >60 ml/min/1.73m2    Calcium 8.6 8.5 - 10.1 MG/DL    Bilirubin, total 0.5 0.2 - 1.0 MG/DL    ALT (SGPT) 68 12 - 78 U/L    AST (SGOT) 30 15 - 37 U/L    Alk. phosphatase 133 (H) 45 - 117 U/L    Protein, total 6.2 (L) 6.4 - 8.2 g/dL    Albumin 2.0 (L) 3.5 - 5.0 g/dL    Globulin 4.2 (H) 2.0 - 4.0 g/dL    A-G Ratio 0.5 (L) 1.1 - 2.2     CBC WITH AUTOMATED DIFF    Collection Time: 12/08/22  5:08 AM   Result Value Ref Range    WBC 22.2 (H) 3.6 - 11.0 K/uL    RBC 3.87 3.80 - 5.20 M/uL    HGB 12.0 11.5 - 16.0 g/dL    HCT 35.0 35.0 - 47.0 %    MCV 90.4 80.0 - 99.0 FL    MCH 31.0 26.0 - 34.0 PG    MCHC 34.3 30.0 - 36.5 g/dL    RDW 13.6 11.5 - 14.5 %    PLATELET 753 741 - 012 K/uL    MPV 10.8 8.9 - 12.9 FL    NRBC 0.0 0  WBC    ABSOLUTE NRBC 0.00 0.00 - 0.01 K/uL    NEUTROPHILS 84 (H) 32 - 75 %    LYMPHOCYTES 10 (L) 12 - 49 %    MONOCYTES 6 5 - 13 %    EOSINOPHILS 0 0 - 7 %    BASOPHILS 0 0 - 1 %    IMMATURE GRANULOCYTES 0 0.0 - 0.5 %    ABS. NEUTROPHILS 18.7 (H) 1.8 - 8.0 K/UL    ABS. LYMPHOCYTES 2.2 0.8 - 3.5 K/UL    ABS. MONOCYTES 1.3 (H) 0.0 - 1.0 K/UL    ABS. EOSINOPHILS 0.0 0.0 - 0.4 K/UL    ABS. BASOPHILS 0.0 0.0 - 0.1 K/UL    ABS. IMM.  GRANS. 0.0 0.00 - 0.04 K/UL    DF AUTOMATED      RBC COMMENTS NORMOCYTIC, NORMOCHROMIC     METABOLIC PANEL, BASIC    Collection Time: 12/08/22  2:00 PM   Result Value Ref Range    Sodium 134 (L) 136 - 145 mmol/L    Potassium 3.0 (L) 3.5 - 5.1 mmol/L    Chloride 94 (L) 97 - 108 mmol/L    CO2 33 (H) 21 - 32 mmol/L    Anion gap 7 5 - 15 mmol/L    Glucose 121 (H) 65 - 100 mg/dL    BUN 6 6 - 20 MG/DL    Creatinine 0.58 0.55 - 1.02 MG/DL    BUN/Creatinine ratio 10 (L) 12 - 20      eGFR >60 >60 ml/min/1.73m2    Calcium 8.4 (L) 8.5 - 10.1 MG/DL   MAGNESIUM    Collection Time: 12/08/22  2:00 PM   Result Value Ref Range    Magnesium 1.8 1.6 - 2.4 mg/dL       CT CHEST W CONT   Final Result   1. Small nonspecific pulmonary nodules. Attention on follow-up imaging. 2.  Aneurysmal dilatation of the ascending aorta. 3.  Multiple liver masses are again identified. CT ABD PELV W CONT   Final Result   Mild sigmoid diverticulitis. Multiple mass lesions are noted within the liver   compatible with metastatic disease. XR CHEST PA LAT   Final Result      Normal PA and lateral chest views.              Active Problems:    Influenza-like illness (12/7/2022)      Liver mass (12/7/2022)        Assessment/Plan:   Leukocytosis (WBC 22.2)  Neutrophilia (Neutrophils 84 / ANC 18.7)  N/V/D x 2 weeks, bloody stool  Weight loss, fatigue, febrile (100.4F)  SIRS (WBC 22.2, Temp 38C)  CT chest - pulmonary nodules  CT abd/pelvis - sigmoid diverticulitis, multiple mass lesions within liver compatible w/ metastatic dz  IV Levaquin and Flagyl  Blood Cx NGTD (1 day)  UA 1+ bacteria, nitrites (-), follow C&S  MRSA cx pending  GI consulted, suspicious for colon CA, plan for colonoscopy 12/9  CEA 3.1, AFP, CA 19-9 pending  Oncology consulted  Check procal, lactic acid  PRN antipyretics > 100.4F, PRN antiemetics    Electrolyte Disturbances, likely 2/2 GI losses and decreased PO intake  Na 126 >>129 >> 131 >> 134  K 2.6 >> 2.7 >> 2.2 >> 3.0  Mg 2.0 >> 1.8  NS + 20 KCL @ 100 mL/hr  Replacing K both PO and IV  Mg Sulfate IV 2 gm x 1 dose  Encourage PO intake as tolerated  PRN antiemetics    Hx ETOH, nicotine use  Whiskey 8 ounces daily x 5+ years - Reports cessation ~3 weeks ago  Offered nicotine patch, declined  Amendable to PO lorazepam  PRN- anxious/tearful affect - suspect r/t current illness not withdrawals    Disposition: Pending hospital course  DVT Prophylaxis: SCDs - blood in stool, colonoscopy in 12/9 AM  Code Status: Full Code  POA: Jazmine Bhatia Child - [phone]    Care Plan discussed with: Patient, Nursing  _______________________________________________________________    LORENE Bullock

## 2022-12-08 NOTE — PROGRESS NOTES
Problem: Mobility Impaired (Adult and Pediatric)  Goal: *Acute Goals and Plan of Care (Insert Text)  Outcome: Resolved/Met  Note:   PHYSICAL THERAPY EVALUATION/DISCHARGE  Patient: Rimma Johnston (63 y.o. female)  Date: 12/8/2022  Primary Diagnosis: Liver mass [R16.0]  Procedure(s) (LRB):  COLONOSCOPY (N/A)     Precautions:   Seizure, Fall, Bed Alarm      ASSESSMENT  Based on the objective data described below, the patient presents with decreased activity tolerance, difficulty in making it to bathroom due to urgency/diarrhea. Patient is independent with bed mobility, transfers, and able to ambulate without assistive device. Patient is steady ambulating with no LOB noted. Patient has been up ad parris in room. Patient has assistance at home if needed. No further PT recommended at this time. Functional Outcome Measure: The patient scored 95/100 on the Barthel Index outcome measure which is indicative of independent ADL's and mobility. Other factors to consider for discharge: increased time in bed recently     Further skilled acute physical therapy is not indicated at this time. PLAN :  Recommendation for discharge: (in order for the patient to meet his/her long term goals)  No skilled physical therapy/ follow up rehabilitation needs identified at this time. This discharge recommendation:  Has been made in collaboration with the attending provider and/or case management    IF patient discharges home will need the following DME: none       SUBJECTIVE:   Patient stated I don't need the walker.     OBJECTIVE DATA SUMMARY:   HISTORY:    Past Medical History:   Diagnosis Date    High cholesterol     Infectious disease     Other ill-defined conditions(119.89)     fluid build up in chest     Past Surgical History:   Procedure Laterality Date    HX BREAST BIOPSY         Prior level of function: independent with all mobility  Personal factors and/or comorbidities impacting plan of care: new diagnosis    Home Situation  Home Environment: Private residence  # Steps to Enter: 5  Rails to Enter: Yes  Hand Rails : Bilateral  One/Two Story Residence: Two story  # of Interior Steps: 15  Interior Rails: Left  Living Alone: No  Support Systems: Child(mata)  Patient Expects to be Discharged to[de-identified] Home  Current DME Used/Available at Home: None    EXAMINATION/PRESENTATION/DECISION MAKING:   Critical Behavior:  Neurologic State: Alert  Orientation Level: Oriented X4  Cognition: Appropriate decision making  Safety/Judgement: Awareness of environment, Insight into deficits  Hearing: Auditory  Auditory Impairment: None  Skin:  intact  Edema: none noted  Range Of Motion:  AROM: Within functional limits           PROM: Within functional limits           Strength:    Strength: Within functional limits                    Tone & Sensation:   Tone: Normal              Sensation: Intact               Coordination:  Coordination: Within functional limits  Vision:      Functional Mobility:  Bed Mobility:  Rolling: Independent  Supine to Sit: Independent  Sit to Supine: Independent  Scooting: Independent  Transfers:  Sit to Stand: Independent  Stand to Sit: Independent        Bed to Chair: Supervision              Balance:   Sitting: Intact  Standing: Intact  Ambulation/Gait Training:  Distance (ft): 160 Feet (ft)  Assistive Device: Gait belt (tried RW but does not need)  Ambulation - Level of Assistance: Supervision           Right Side Weight Bearing: Full  Left Side Weight Bearing: Full           Step Length: Left shortened;Right shortened                                  Functional Measure:  Barthel Index:    Bathin  Bladder: 10  Bowels: 5  Groomin  Dressing: 10  Feeding: 10  Mobility: 15  Stairs: 10  Toilet Use: 10  Transfer (Bed to Chair and Back): 15  Total: 95/100       The Barthel ADL Index: Guidelines  1. The index should be used as a record of what a patient does, not as a record of what a patient could do.   2. The main aim is to establish degree of independence from any help, physical or verbal, however minor and for whatever reason. 3. The need for supervision renders the patient not independent. 4. A patient's performance should be established using the best available evidence. Asking the patient, friends/relatives and nurses are the usual sources, but direct observation and common sense are also important. However direct testing is not needed. 5. Usually the patient's performance over the preceding 24-48 hours is important, but occasionally longer periods will be relevant. 6. Middle categories imply that the patient supplies over 50 per cent of the effort. 7. Use of aids to be independent is allowed. Score Interpretation (from [facility] 83)    Independent   60-79 Minimally independent   40-59 Partially dependent   20-39 Very dependent   <20 Totally dependent     -Faith Matias., Barthel, DBillW. (1965). Functional evaluation: the Barthel Index. [facility] ([address] [place] [address] (1997). The Barthel activities of daily living index: self-reporting versus actual performance in the old (> or = 75 years). Journal of [address] [place] [address] 45(7), [facility], J.AN, Geoff Mendoza., Primitivo Root. (1999). Measuring the change in disability after inpatient rehabilitation; comparison of the responsiveness of the Barthel Index and Functional [place] Measure. Journal of Neurology, Neurosurgery, and Psychiatry, 66(4), 119-512. Gomez Clark, N.J.A, Brice Sun,  W.J.NINA, & Marisabel Dodd, MBillA. (2004) Assessment of post-stroke quality of life in cost-effectiveness studies: The usefulness of the Barthel Index and the EuroQoL-5D.  Quality of Life Research, 15, 397-47            Physical Therapy Evaluation Charge Determination   History Examination Presentation Decision-Making   MEDIUM  Complexity : 1-2 comorbidities / personal factors will impact the outcome/ POC  MEDIUM Complexity : 3 Standardized tests and measures addressing body structure, function, activity limitation and / or participation in recreation  LOW Complexity : Stable, uncomplicated  LOW Complexity : FOTO score of       Based on the above components, the patient evaluation is determined to be of the following complexity level: LOW     Pain Rating:  None reported    Activity Tolerance:   Good      After treatment patient left in no apparent distress:   Supine in bed, Call bell within reach, and Side rails x 3    COMMUNICATION/EDUCATION:   The patients plan of care was discussed with: Occupational therapist and Registered nurse. Fall prevention education was provided and the patient/caregiver indicated understanding. and Patient/family agree to work toward stated goals and plan of care.     Thank you for this referral.  Jeremiah Ham, PT   Time Calculation: 23 mins

## 2022-12-08 NOTE — PROGRESS NOTES
Spiritual Care Assessment/Progress Note  [facility]      NAME: Krupa Teague      MRN: [id number]  AGE: 63 y.o.  SEX: female  Muslim Affiliation: Restorationism   Language: English     12/8/2022     Total Time (in minutes): 80     Spiritual Assessment begun in [facility] through conversation with:         [x]Patient        [x] Family    [] Friend(s)        Reason for Consult: Advance medical directive consult     Spiritual beliefs: (Please include comment if needed)     [x] Identifies with a alvaro tradition:         [] Supported by a alvaro community:            [] Claims no spiritual orientation:           [] Seeking spiritual identity:                [] Adheres to an individual form of spirituality:           [] Not able to assess:                           Identified resources for coping:      [] Prayer                               [] Music                  [] Guided Imagery     [x] Family/friends                 [] Pet visits     [] Devotional reading                         [] Unknown     [] Other:                                               Interventions offered during this visit: (See comments for more details)    Patient Interventions: Advance medical directive completed, Initial visit, Initial/Spiritual assessment, patient floor, Affirmation of emotions/emotional suffering, Normalization of emotional/spiritual concerns, Prayer (assurance of)     Family/Friend(s): Prayer (assurance of), Normalization of emotional/spiritual concerns, Affirmation of emotions/emotional suffering, Advance medical directive completed, Initial Assessment     Plan of Care:     [x] Support spiritual and/or cultural needs    [x] Support AMD and/or advance care planning process      [] Support grieving process   [] Coordinate Rites and/or Rituals    [] Coordination with community clergy   [] No spiritual needs identified at this time   [] Detailed Plan of Care below (See Comments)  [] Make referral to Music Therapy  [] Make referral to Pet Therapy     [] Make referral to Addiction services  [] Make referral to [facility]  [] Make referral to Spiritual Care Partner  [] No future visits requested        [x] Contact Spiritual Care for further referrals     Comments:  reviewed the patient's chart prior to the visit.  coordinated services with [place]. She stated Ms. Alvino Rios is competent and able to complete the Advance Medical Directive (AMD). Ms. Alvino Rios was in bed with her eyes open. She welcomed the  into her room. She called her daughter (Primary Agent) to allow her to be part of the AMD conversation.  allowed time for questions and answers. We had a few interruptions during the AMD process to include the  receiving a daytime page. However she was able to complete the AMD process. Ms. Alvino Rios is an organ donor and it is on her 's license. Matilde Sultana- primary care agent/daughter  [address] [place] [address]  [phone]  Ms. Weaver received her original document and several copies of the Advance Medical Directive. A copy was also placed in her chart. She thanked the [employer] for the visit.  services are available 24  hours a day as requested. Rev. OLGA Hopson.  [facility]   Paging Service [phone])

## 2022-12-08 NOTE — ACP (ADVANCE CARE PLANNING)
Advance Care Planning   Advance Care Planning Inpatient Note  [address]  Spiritual Care Department    Today's Date: 12/8/2022  Unit: [facility] 1 CLINICAL OBS    Received request from Health Care provider. Upon review of chart and communication with care team, patient's decision making abilities are not in question. Patient was/were present in the room during visit. Goals of ACP Conversation:  Discuss Advance Care planning documents  Facilitate a discussion related to patient's goals of care as they align with the patient's values and beliefs      Health Care Decision Makers:      Primary Decision Maker: Amber Nesconset Child - [phone]    Summary:  Verified Documents  Completed [address]    Advance Care Planning Documents (Patient Wishes) on file:  Living Will/ Advance Directive     Assessment:        reviewed the patient's chart prior to the visit.  coordinated services with [place]. She stated Ms. Gracia Cox is competent and able to complete the Advance Medical Directive (AMD). Ms. Gracia Cox was in bed with her eyes open. She welcomed the  into her room. She called her daughter (Primary Agent) to allow her to be part of the AMD conversation.  allowed time for questions and answers. We had a few interruptions during the AMD process to include the  receiving a daytime page. However she was able to complete the AMD process. 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 Manda Harada- primary care agent/daughter  [address] [place] [address]  [phone]  Ms. Weaver received her original document and several copies of the Advance Medical Directive. A copy was also placed in her chart. She thanked the [address] [place] [address] for the visit.  services are available 24  hours a day as requested.      Interventions:  Provided education on documents for clarity and greater understanding  Discussed and provided education on state decision maker hierarchy  Assisted in the completion of documents according to patient's wishes at this time  Encouraged ongoing ACP conversation with future decision makers and loved ones      Care Preferences Communicated:  No    Outcomes/Plan:  ACP Discussion Completed  New Advance Directive completed  Returned original document(s) to patient, as well as copies for distribution to appointed agents  Copy of Advance Directive given to staff to scan into medical record    Gibson Yanez, [address] [place] [address] on 12/8/2022 at 11:19 AM

## 2022-12-08 NOTE — PROGRESS NOTES
Comprehensive Nutrition Assessment    Type and Reason for Visit: Initial, Positive nutrition screen    Nutrition Recommendations/Plan:   Advance diet as medically able per GI following colonoscopy on Friday. Monitor lytes closely as pt is at higher risk of refeeding. Please document % meals and supplements consumed in flowsheet I/O's under intake . Malnutrition Assessment:  Malnutrition Status:  Severe malnutrition (12/08/22 1306)    Context:  Acute illness     Findings of the 6 clinical characteristics of malnutrition:   Energy Intake:  50% or less of est energy requirements for 5 or more days  Weight Loss:  Greater than 2% over 1 week (7# or 4% in 2 weeks)     Body Fat Loss:  No significant body fat loss,     Muscle Mass Loss:  No significant muscle mass loss,    Fluid Accumulation:  No significant fluid accumulation,     Strength:  Not performed     Nutrition Assessment:     12/8 Chart reviewed for positive MST. Med noted for liver mass; presented with body aches, fever, and nausea/vomiting for 2 weeks PTA. PMHx significant for hyperlipidemia, tobacco use, and etoh dependence. GI is following and noted sigmoid thickening; suspicion for metastatic disease and colonoscopy planned for Friday. Pt is currently on clear liquids for procedure; NPO at midnight on 12/9. Visited pt at bedside who reports she is hungry today; ate jello and an ice pop; no broth (bad aftertaste), coffee, or juice. Denies Ensure Clear at this time. Reports decreased intake PTA for 2 weeks and thinks she lost between 7-10# in this time. Recalls eating a cheeseburger and fries but not much else aside from water for 2 weeks. NFPE revealed no significant muscle wasting or fat loss; however, decreased intake and weight loss meet criteria for severe malnutrition in the context of acute illness. Pt is at increased risk of refeeding; will continue to monitor lytes. Noted K low at 2.2, being repleted.  Will continue to monitor and make recommendations that align with goals of care. Wt Readings from Last 5 Encounters:   12/07/22 77.1 kg (170 lb)   11/13/21 76.1 kg (167 lb 12.3 oz)   07/08/16 68.9 kg (151 lb 14.4 oz)   06/12/15 71.8 kg (158 lb 4.6 oz)   05/09/13 71.2 kg (157 lb)   ]    Nutrition Related Findings:    Labs: Na 131, K 2.2, Cr 0.54, -117-123, alk phos 133   Meds: Folvite, Levaquin, Flagyl, thera-m w/ iron, Miralax, KCl, thiamine   BM: 12/8   Edema: None   Wound Type: None    Current Nutrition Intake & Therapies:  Average Meal Intake: 1-25%  Average Supplement Intake: None ordered  DIET NPO  ADULT DIET Clear Liquid    Anthropometric Measures:  Height: 5' 4\" (162.6 cm)  Ideal Body Weight (IBW): 120 lbs (55 kg)     Current Body Wt:  77.1 kg (169 lb 15.6 oz), 141.6 % IBW. Current BMI (kg/m2): 29.2        Weight Adjustment: No adjustment                 BMI Category: Overweight (BMI 25.0-29. 9)    Estimated Daily Nutrient Needs:  Energy Requirements Based On: Formula  Weight Used for Energy Requirements: Current  Energy (kcal/day): 1725 kcals (BMR x 1.3 AF)  Weight Used for Protein Requirements: Current  Protein (g/day): 77-93 g/day (1.0-1.2 g/kg)  Method Used for Fluid Requirements: 1 ml/kcal  Fluid (ml/day): 1700 mL    Nutrition Diagnosis:   Inadequate protein-energy intake related to altered GI function as evidenced by NPO or clear liquid status due to medical condition. Nutrition Interventions:   Food and/or Nutrient Delivery: Continue current diet  Nutrition Education/Counseling: No recommendations at this time  Coordination of Nutrition Care: Continue to monitor while inpatient       Goals:     Goals: Meet at least 75% of estimated needs, by next RD assessment       Nutrition Monitoring and Evaluation:   Behavioral-Environmental Outcomes: None identified  Food/Nutrient Intake Outcomes: Food and nutrient intake  Physical Signs/Symptoms Outcomes: Biochemical data, Skin, Weight, GI status    Discharge Planning:     Too soon to determine    Mallory Ham  Contact:

## 2022-12-08 NOTE — PROGRESS NOTES
Occupational Therapy   Chart reviewed. Cleared for tx; patient declines further therapy; has completed PT and is discharged from PT. Advised patient to notify RN if she feeling she has functional decline, increased fatigue and therapy can be re-ordered; will complete current OT order.  Mihaela Garcia OTR/L

## 2022-12-08 NOTE — PROGRESS NOTES
GI PROGRESS NOTE  Dylan Lerner, WARD  [phone] NP in-hospital cell phone M-F until 4:30  After 5pm or on weekends, please call  for physician on call    NAME:Luz Sheffield :1960 YVU:[id number]   ATTG: Dr. Radha Bradford  PCP: Andrew Godwin MD  Date/Time:  2022 4:26 PM     Primary GI: Dr. Arlene Ruelas for following: abnormal CT scan, sigmoid divertiuclitis    Assessment:     Abnormal CT scan   Sigmoid colonic wall thickening - acute diverticulitis vs malignancy  Multiple liver lesions concerning for mets  ETOH use  General weakness  - CT abd/pel W 22 :  Mild sigmoid diverticulitis. Multiple mass lesions are noted within the liver compatible with metastatic disease.  - Whiskey 8 ounces every day to every other day for the last 5+ yrs.   - Reports a change in bowel habits about a year ago, from formed stools to now loose with frequent specks of blood- thought it was hemorrhoids  - Colonoscopy  (Dr Gulshan Hi) - diverticulosis, hyperplastic polyp. -CEA 3.1, CA 19-9 & AFP pending  Hypokalemia  Hyperlipidemia  Tobacco use     Plan:     -Plan for colonoscopy Friday with Dr. Iona Serrano  -Clear liquids today, NPO after midnight  -Bowel prep to start at 4pm  -Agree with antibiotics  -Supportive measures per primary team  -Will continue to follow. Please call GI with any further questions or concerns. Thank you! *Plan discussed with Dr. Iona Serrano  Subjective:   Patient resting comfortably in chair by bedside. Reports feeling better. No nausea or vomiting. Reports sitting up has help relieve some pain. Tolerating clear liquid diet. Objective:   VITALS:   Last 24hrs VS reviewed since prior progress note.  Most recent are:  Visit Vitals  BP 96/65 (BP 1 Location: Left upper arm, BP Patient Position: At rest;Sitting)   Pulse 91   Temp 100.4 °F (38 °C)   Resp 17   Ht 5' 4\" (1.626 m)   Wt 77.1 kg (170 lb)   SpO2 97%   Breastfeeding No   BMI 29.18 kg/m²     No intake or output data in the 24 hours ending 12/08/22 1626  PHYSICAL EXAM:  General: WD, WN. Alert, cooperative, no acute distress    HEENT: NC, Atraumatic. Anicteric sclerae. Lungs:  CTA Bilaterally. No Wheezing/Rhonchi/Rales. Heart:  Regular  rhythm, No Rubs, No Gallops  Abdomen: Soft, Non distended, mildly tender. +Bowel sounds, no HSM  Extremities: No c/c/e  Neurologic:  Alert and oriented X 3. No acute neurological distress   Psych:   Good insight. Not anxious nor agitated. Lab and Radiology Data Reviewed: (see below)    Medications Reviewed: (see below)  PMH/SH reviewed - no change compared to H&P  ________________________________________________________________________  Total time spent with patient: 20minutes ________________________________________________________________________  Care Plan discussed with:  Patient Y   Family     RN Y              Consultant:       Lars Romero NP     Procedures: see electronic medical records for all procedures/Xrays and details which were not copied into this note but were reviewed prior to creation of Plan. LABS:  Recent Labs     12/08/22  0508 12/07/22  0114   WBC 22.2* 21.4*   HGB 12.0 13.3   HCT 35.0 37.2    316     Recent Labs     12/08/22  1400 12/08/22  0508 12/07/22  0502   * 131* 129*   K 3.0* 2.2* 2.7*   CL 94* 90* 83*   CO2 33* 34* 35*   BUN 6 7 11   CREA 0.58 0.54* 0.71   * 138* 117*   CA 8.4* 8.6 8.8   MG 1.8  --  2.0     Recent Labs     12/08/22  0508 12/07/22  0502 12/07/22  0114   *  --  169*   TP 6.2*  --  7.0   ALB 2.0*  --  2.2*   GLOB 4.2*  --  4.8*   LPSE  --  142  --      No results for input(s): INR, PTP, APTT, INREXT in the last 72 hours. No results for input(s): FE, TIBC, PSAT, FERR in the last 72 hours. No results found for: FOL, RBCF  No results for input(s): PH, PCO2, PO2 in the last 72 hours.   Recent Labs     12/07/22 0502   CPK 28     Lab Results   Component Value Date/Time    Color YELLOW/STRAW 12/07/2022 01:14 AM    Appearance CLEAR 12/07/2022 01:14 AM    Specific gravity 1.005 12/07/2022 01:14 AM    pH (UA) 7.0 12/07/2022 01:14 AM    Protein TRACE (A) 12/07/2022 01:14 AM    Glucose Negative 12/07/2022 01:14 AM    Ketone Negative 12/07/2022 01:14 AM    Bilirubin Negative 12/07/2022 01:14 AM    Urobilinogen 1.0 12/07/2022 01:14 AM    Nitrites Negative 12/07/2022 01:14 AM    Leukocyte Esterase TRACE (A) 12/07/2022 01:14 AM    Epithelial cells FEW 12/07/2022 01:14 AM    Bacteria 1+ (A) 12/07/2022 01:14 AM    WBC 0-4 12/07/2022 01:14 AM    RBC 5-10 12/07/2022 01:14 AM       MEDICATIONS:  Current Facility-Administered Medications   Medication Dose Route Frequency    0.9% sodium chloride with KCl 20 mEq/L infusion   IntraVENous CONTINUOUS    peg 3350-electrolytes (COLYTE) 4000 mL  4,000 mL Oral ONCE    LORazepam (ATIVAN) tablet 2 mg  2 mg Oral Q4H PRN    magnesium sulfate 2 g/50 ml IVPB (premix or compounded)  2 g IntraVENous ONCE    potassium chloride (K-DUR, KLOR-CON M20) SR tablet 40 mEq  40 mEq Oral NOW    potassium chloride 10 mEq in 100 ml IVPB  10 mEq IntraVENous Q1H    metroNIDAZOLE (FLAGYL) IVPB premix 500 mg  500 mg IntraVENous Q8H    sodium chloride (NS) flush 5-40 mL  5-40 mL IntraVENous Q8H    sodium chloride (NS) flush 5-40 mL  5-40 mL IntraVENous PRN    acetaminophen (TYLENOL) tablet 650 mg  650 mg Oral Q6H PRN    Or    acetaminophen (TYLENOL) suppository 650 mg  650 mg Rectal Q6H PRN    polyethylene glycol (MIRALAX) packet 17 g  17 g Oral DAILY PRN    ondansetron (ZOFRAN ODT) tablet 4 mg  4 mg Oral Q8H PRN    Or    ondansetron (ZOFRAN) injection 4 mg  4 mg IntraVENous Q6H PRN    levoFLOXacin (LEVAQUIN) 750 mg in D5W IVPB  750 mg IntraVENous Q24H    oxyCODONE IR (ROXICODONE) tablet 5 mg  5 mg Oral Q4H PRN    melatonin tablet 3 mg  3 mg Oral QHS PRN    morphine injection 1 mg  1 mg IntraVENous Q4H PRN    diazePAM (VALIUM) injection 5 mg  5 mg IntraVENous Q1H PRN    diazePAM (VALIUM) injection 10 mg  10 mg IntraVENous Q1H PRN folic acid (FOLVITE) tablet 1 mg  1 mg Oral DAILY    thiamine mononitrate (B-1) tablet 100 mg  100 mg Oral DAILY    multivitamin, tx-iron-ca-min (THERA-M w/ IRON) tablet 1 Tablet  1 Tablet Oral DAILY    hydrALAZINE (APRESOLINE) 20 mg/mL injection 10 mg  10 mg IntraVENous Q6H PRN    polyethylene glycol (MIRALAX) packet 34 g  34 g Oral BID

## 2022-12-09 ENCOUNTER — ANESTHESIA (OUTPATIENT)
Dept: ENDOSCOPY | Age: 62
DRG: 392 | End: 2022-12-09
Payer: COMMERCIAL

## 2022-12-09 ENCOUNTER — APPOINTMENT (OUTPATIENT)
Dept: ULTRASOUND IMAGING | Age: 62
DRG: 392 | End: 2022-12-09
Attending: INTERNAL MEDICINE
Payer: COMMERCIAL

## 2022-12-09 VITALS
WEIGHT: 170 LBS | DIASTOLIC BLOOD PRESSURE: 72 MMHG | SYSTOLIC BLOOD PRESSURE: 99 MMHG | TEMPERATURE: 98.3 F | OXYGEN SATURATION: 95 % | RESPIRATION RATE: 18 BRPM | HEIGHT: 64 IN | BODY MASS INDEX: 29.02 KG/M2 | HEART RATE: 87 BPM

## 2022-12-09 LAB
ANION GAP SERPL CALC-SCNC: 10 MMOL/L (ref 5–15)
APTT PPP: 26.1 SEC (ref 22.1–31)
BACTERIA SPEC CULT: NORMAL
BACTERIA SPEC CULT: NORMAL
BUN SERPL-MCNC: 6 MG/DL (ref 6–20)
BUN/CREAT SERPL: 12 (ref 12–20)
CALCIUM SERPL-MCNC: 8.3 MG/DL (ref 8.5–10.1)
CANCER AG19-9 SERPL-ACNC: 2 U/ML (ref 0–35)
CHLORIDE SERPL-SCNC: 98 MMOL/L (ref 97–108)
CO2 SERPL-SCNC: 28 MMOL/L (ref 21–32)
CREAT SERPL-MCNC: 0.51 MG/DL (ref 0.55–1.02)
ERYTHROCYTE [DISTWIDTH] IN BLOOD BY AUTOMATED COUNT: 14 % (ref 11.5–14.5)
GLUCOSE SERPL-MCNC: 88 MG/DL (ref 65–100)
HCT VFR BLD AUTO: 35.9 % (ref 35–47)
HGB BLD-MCNC: 11.8 G/DL (ref 11.5–16)
MAGNESIUM SERPL-MCNC: 2 MG/DL (ref 1.6–2.4)
MCH RBC QN AUTO: 30.6 PG (ref 26–34)
MCHC RBC AUTO-ENTMCNC: 32.9 G/DL (ref 30–36.5)
MCV RBC AUTO: 93.2 FL (ref 80–99)
NRBC # BLD: 0 K/UL (ref 0–0.01)
NRBC BLD-RTO: 0 PER 100 WBC
PLATELET # BLD AUTO: 375 K/UL (ref 150–400)
PMV BLD AUTO: 10.6 FL (ref 8.9–12.9)
POTASSIUM SERPL-SCNC: 3 MMOL/L (ref 3.5–5.1)
POTASSIUM SERPL-SCNC: 3.4 MMOL/L (ref 3.5–5.1)
RBC # BLD AUTO: 3.85 M/UL (ref 3.8–5.2)
SERVICE CMNT-IMP: NORMAL
SODIUM SERPL-SCNC: 136 MMOL/L (ref 136–145)
THERAPEUTIC RANGE,PTTT: NORMAL SECS (ref 58–77)
WBC # BLD AUTO: 26.3 K/UL (ref 3.6–11)

## 2022-12-09 PROCEDURE — 77030013992 HC SNR POLYP ENDOSC BSC -B: Performed by: INTERNAL MEDICINE

## 2022-12-09 PROCEDURE — 74011000250 HC RX REV CODE- 250: Performed by: INTERNAL MEDICINE

## 2022-12-09 PROCEDURE — 85730 THROMBOPLASTIN TIME PARTIAL: CPT

## 2022-12-09 PROCEDURE — 88305 TISSUE EXAM BY PATHOLOGIST: CPT

## 2022-12-09 PROCEDURE — 0FB13ZX EXCISION OF RIGHT LOBE LIVER, PERCUTANEOUS APPROACH, DIAGNOSTIC: ICD-10-PCS | Performed by: STUDENT IN AN ORGANIZED HEALTH CARE EDUCATION/TRAINING PROGRAM

## 2022-12-09 PROCEDURE — 88307 TISSUE EXAM BY PATHOLOGIST: CPT

## 2022-12-09 PROCEDURE — 88341 IMHCHEM/IMCYTCHM EA ADD ANTB: CPT

## 2022-12-09 PROCEDURE — 36415 COLL VENOUS BLD VENIPUNCTURE: CPT

## 2022-12-09 PROCEDURE — 87205 SMEAR GRAM STAIN: CPT

## 2022-12-09 PROCEDURE — 74011250636 HC RX REV CODE- 250/636: Performed by: PHYSICIAN ASSISTANT

## 2022-12-09 PROCEDURE — 87102 FUNGUS ISOLATION CULTURE: CPT

## 2022-12-09 PROCEDURE — 2709999900 HC NON-CHARGEABLE SUPPLY: Performed by: INTERNAL MEDICINE

## 2022-12-09 PROCEDURE — 76060000032 HC ANESTHESIA 0.5 TO 1 HR: Performed by: INTERNAL MEDICINE

## 2022-12-09 PROCEDURE — 74011250637 HC RX REV CODE- 250/637: Performed by: INTERNAL MEDICINE

## 2022-12-09 PROCEDURE — 76040000007: Performed by: INTERNAL MEDICINE

## 2022-12-09 PROCEDURE — 74011250636 HC RX REV CODE- 250/636: Performed by: EMERGENCY MEDICINE

## 2022-12-09 PROCEDURE — 74011250636 HC RX REV CODE- 250/636: Performed by: INTERNAL MEDICINE

## 2022-12-09 PROCEDURE — 88313 SPECIAL STAINS GROUP 2: CPT

## 2022-12-09 PROCEDURE — 80048 BASIC METABOLIC PNL TOTAL CA: CPT

## 2022-12-09 PROCEDURE — 74011250636 HC RX REV CODE- 250/636: Performed by: NURSE ANESTHETIST, CERTIFIED REGISTERED

## 2022-12-09 PROCEDURE — 85027 COMPLETE CBC AUTOMATED: CPT

## 2022-12-09 PROCEDURE — 0F913ZX DRAINAGE OF RIGHT LOBE LIVER, PERCUTANEOUS APPROACH, DIAGNOSTIC: ICD-10-PCS | Performed by: STUDENT IN AN ORGANIZED HEALTH CARE EDUCATION/TRAINING PROGRAM

## 2022-12-09 PROCEDURE — 87116 MYCOBACTERIA CULTURE: CPT

## 2022-12-09 PROCEDURE — 74011250636 HC RX REV CODE- 250/636: Performed by: STUDENT IN AN ORGANIZED HEALTH CARE EDUCATION/TRAINING PROGRAM

## 2022-12-09 PROCEDURE — 88342 IMHCHEM/IMCYTCHM 1ST ANTB: CPT

## 2022-12-09 PROCEDURE — 83735 ASSAY OF MAGNESIUM: CPT

## 2022-12-09 PROCEDURE — 0DBP8ZX EXCISION OF RECTUM, VIA NATURAL OR ARTIFICIAL OPENING ENDOSCOPIC, DIAGNOSTIC: ICD-10-PCS | Performed by: INTERNAL MEDICINE

## 2022-12-09 PROCEDURE — 84132 ASSAY OF SERUM POTASSIUM: CPT

## 2022-12-09 PROCEDURE — 77030040395 HC NDL BIOP COAX INTRO MRTM -B

## 2022-12-09 PROCEDURE — 77030014115 US GUIDE BX LIV PERC

## 2022-12-09 PROCEDURE — 88333 PATH CONSLTJ SURG CYTO XM 1: CPT

## 2022-12-09 PROCEDURE — 0FB23ZX EXCISION OF LEFT LOBE LIVER, PERCUTANEOUS APPROACH, DIAGNOSTIC: ICD-10-PCS | Performed by: STUDENT IN AN ORGANIZED HEALTH CARE EDUCATION/TRAINING PROGRAM

## 2022-12-09 RX ORDER — NALOXONE HYDROCHLORIDE 0.4 MG/ML
0.4 INJECTION, SOLUTION INTRAMUSCULAR; INTRAVENOUS; SUBCUTANEOUS
Status: DISCONTINUED | OUTPATIENT
Start: 2022-12-09 | End: 2022-12-09 | Stop reason: HOSPADM

## 2022-12-09 RX ORDER — FENTANYL CITRATE 50 UG/ML
25-100 INJECTION, SOLUTION INTRAMUSCULAR; INTRAVENOUS
Status: DISCONTINUED | OUTPATIENT
Start: 2022-12-09 | End: 2022-12-09

## 2022-12-09 RX ORDER — DEXTROMETHORPHAN/PSEUDOEPHED 2.5-7.5/.8
1.2 DROPS ORAL
Status: DISCONTINUED | OUTPATIENT
Start: 2022-12-09 | End: 2022-12-09 | Stop reason: HOSPADM

## 2022-12-09 RX ORDER — SODIUM CHLORIDE 0.9 % (FLUSH) 0.9 %
5-40 SYRINGE (ML) INJECTION AS NEEDED
Status: DISCONTINUED | OUTPATIENT
Start: 2022-12-09 | End: 2022-12-10 | Stop reason: HOSPADM

## 2022-12-09 RX ORDER — SODIUM CHLORIDE 9 MG/ML
25 INJECTION, SOLUTION INTRAVENOUS CONTINUOUS
Status: DISPENSED | OUTPATIENT
Start: 2022-12-09 | End: 2022-12-09

## 2022-12-09 RX ORDER — ACETAMINOPHEN 325 MG/1
650 TABLET ORAL
Qty: 120 TABLET | Refills: 0 | Status: ON HOLD | OUTPATIENT
Start: 2022-12-09 | End: 2023-01-08

## 2022-12-09 RX ORDER — PROPOFOL 10 MG/ML
INJECTION, EMULSION INTRAVENOUS AS NEEDED
Status: DISCONTINUED | OUTPATIENT
Start: 2022-12-09 | End: 2022-12-09 | Stop reason: HOSPADM

## 2022-12-09 RX ORDER — PHENYLEPHRINE HCL IN 0.9% NACL 0.4MG/10ML
SYRINGE (ML) INTRAVENOUS AS NEEDED
Status: DISCONTINUED | OUTPATIENT
Start: 2022-12-09 | End: 2022-12-09 | Stop reason: HOSPADM

## 2022-12-09 RX ORDER — ATROPINE SULFATE 0.1 MG/ML
0.5 INJECTION INTRAVENOUS
Status: DISCONTINUED | OUTPATIENT
Start: 2022-12-09 | End: 2022-12-09 | Stop reason: HOSPADM

## 2022-12-09 RX ORDER — LANOLIN ALCOHOL/MO/W.PET/CERES
3 CREAM (GRAM) TOPICAL
Qty: 30 TABLET | Refills: 0 | Status: ON HOLD | OUTPATIENT
Start: 2022-12-09

## 2022-12-09 RX ORDER — LORAZEPAM 0.5 MG/1
0.5 TABLET ORAL
Qty: 12 TABLET | Refills: 0 | Status: SHIPPED | OUTPATIENT
Start: 2022-12-09 | End: 2022-12-12

## 2022-12-09 RX ORDER — LIDOCAINE HYDROCHLORIDE 10 MG/ML
20 INJECTION, SOLUTION EPIDURAL; INFILTRATION; INTRACAUDAL; PERINEURAL
Status: COMPLETED | OUTPATIENT
Start: 2022-12-09 | End: 2022-12-09

## 2022-12-09 RX ORDER — SODIUM CHLORIDE 0.9 % (FLUSH) 0.9 %
5-40 SYRINGE (ML) INJECTION EVERY 8 HOURS
Status: DISCONTINUED | OUTPATIENT
Start: 2022-12-09 | End: 2022-12-10 | Stop reason: HOSPADM

## 2022-12-09 RX ORDER — EPINEPHRINE 0.1 MG/ML
1 INJECTION INTRACARDIAC; INTRAVENOUS
Status: DISCONTINUED | OUTPATIENT
Start: 2022-12-09 | End: 2022-12-09 | Stop reason: HOSPADM

## 2022-12-09 RX ORDER — FOLIC ACID 1 MG/1
1 TABLET ORAL DAILY
Qty: 30 TABLET | Refills: 0 | Status: ON HOLD | OUTPATIENT
Start: 2022-12-10 | End: 2023-01-09

## 2022-12-09 RX ORDER — MIDAZOLAM HYDROCHLORIDE 1 MG/ML
1-5 INJECTION, SOLUTION INTRAMUSCULAR; INTRAVENOUS
Status: DISCONTINUED | OUTPATIENT
Start: 2022-12-09 | End: 2022-12-09

## 2022-12-09 RX ORDER — FLUMAZENIL 0.1 MG/ML
0.2 INJECTION INTRAVENOUS
Status: DISCONTINUED | OUTPATIENT
Start: 2022-12-09 | End: 2022-12-09 | Stop reason: HOSPADM

## 2022-12-09 RX ORDER — LORAZEPAM 0.5 MG/1
0.5 TABLET ORAL
Status: DISCONTINUED | OUTPATIENT
Start: 2022-12-09 | End: 2022-12-10 | Stop reason: HOSPADM

## 2022-12-09 RX ORDER — ASPIRIN 325 MG/1
100 TABLET, FILM COATED ORAL DAILY
Qty: 30 TABLET | Refills: 0 | Status: SHIPPED | OUTPATIENT
Start: 2022-12-10 | End: 2022-12-16

## 2022-12-09 RX ORDER — LANOLIN ALCOHOL/MO/W.PET/CERES
3 CREAM (GRAM) TOPICAL
Qty: 30 TABLET | Refills: 0 | Status: CANCELLED | OUTPATIENT
Start: 2022-12-09 | End: 2023-01-08

## 2022-12-09 RX ORDER — ASPIRIN 325 MG/1
100 TABLET, FILM COATED ORAL DAILY
Qty: 30 TABLET | Refills: 0 | Status: CANCELLED | OUTPATIENT
Start: 2022-12-10 | End: 2023-01-09

## 2022-12-09 RX ORDER — FOLIC ACID 1 MG/1
1 TABLET ORAL DAILY
Qty: 30 TABLET | Refills: 0 | Status: CANCELLED | OUTPATIENT
Start: 2022-12-10 | End: 2023-01-09

## 2022-12-09 RX ADMIN — PROPOFOL 25 MG: 10 INJECTION, EMULSION INTRAVENOUS at 11:35

## 2022-12-09 RX ADMIN — PROPOFOL 25 MG: 10 INJECTION, EMULSION INTRAVENOUS at 11:43

## 2022-12-09 RX ADMIN — LIDOCAINE HYDROCHLORIDE 20 ML: 10 INJECTION, SOLUTION EPIDURAL; INFILTRATION; INTRACAUDAL; PERINEURAL at 14:08

## 2022-12-09 RX ADMIN — SODIUM CHLORIDE, PRESERVATIVE FREE 10 ML: 5 INJECTION INTRAVENOUS at 15:12

## 2022-12-09 RX ADMIN — POTASSIUM CHLORIDE AND SODIUM CHLORIDE: 900; 150 INJECTION, SOLUTION INTRAVENOUS at 06:20

## 2022-12-09 RX ADMIN — PROPOFOL 25 MG: 10 INJECTION, EMULSION INTRAVENOUS at 11:41

## 2022-12-09 RX ADMIN — PROPOFOL 25 MG: 10 INJECTION, EMULSION INTRAVENOUS at 11:37

## 2022-12-09 RX ADMIN — PROPOFOL 25 MG: 10 INJECTION, EMULSION INTRAVENOUS at 11:56

## 2022-12-09 RX ADMIN — THIAMINE HCL TAB 100 MG 100 MG: 100 TAB at 15:04

## 2022-12-09 RX ADMIN — PROPOFOL 50 MG: 10 INJECTION, EMULSION INTRAVENOUS at 11:33

## 2022-12-09 RX ADMIN — SODIUM CHLORIDE, PRESERVATIVE FREE 10 ML: 5 INJECTION INTRAVENOUS at 05:20

## 2022-12-09 RX ADMIN — FENTANYL CITRATE 25 MCG: 50 INJECTION, SOLUTION INTRAMUSCULAR; INTRAVENOUS at 13:11

## 2022-12-09 RX ADMIN — Medication 1 TABLET: at 15:04

## 2022-12-09 RX ADMIN — PROPOFOL 25 MG: 10 INJECTION, EMULSION INTRAVENOUS at 11:47

## 2022-12-09 RX ADMIN — PROPOFOL 25 MG: 10 INJECTION, EMULSION INTRAVENOUS at 11:50

## 2022-12-09 RX ADMIN — ACETAMINOPHEN 650 MG: 325 TABLET ORAL at 16:52

## 2022-12-09 RX ADMIN — FENTANYL CITRATE 25 MCG: 50 INJECTION, SOLUTION INTRAMUSCULAR; INTRAVENOUS at 13:44

## 2022-12-09 RX ADMIN — SODIUM CHLORIDE 25 ML/HR: 9 INJECTION, SOLUTION INTRAVENOUS at 10:48

## 2022-12-09 RX ADMIN — LEVOFLOXACIN 750 MG: 5 INJECTION, SOLUTION INTRAVENOUS at 05:23

## 2022-12-09 RX ADMIN — PROPOFOL 25 MG: 10 INJECTION, EMULSION INTRAVENOUS at 11:45

## 2022-12-09 RX ADMIN — METRONIDAZOLE 500 MG: 500 INJECTION, SOLUTION INTRAVENOUS at 05:20

## 2022-12-09 RX ADMIN — MIDAZOLAM 1 MG: 1 INJECTION INTRAMUSCULAR; INTRAVENOUS at 13:12

## 2022-12-09 RX ADMIN — FOLIC ACID 1 MG: 1 TABLET ORAL at 15:04

## 2022-12-09 RX ADMIN — MIDAZOLAM 1 MG: 1 INJECTION INTRAMUSCULAR; INTRAVENOUS at 13:45

## 2022-12-09 RX ADMIN — Medication 100 MCG: at 11:38

## 2022-12-09 RX ADMIN — PROPOFOL 25 MG: 10 INJECTION, EMULSION INTRAVENOUS at 11:39

## 2022-12-09 RX ADMIN — PROPOFOL 25 MG: 10 INJECTION, EMULSION INTRAVENOUS at 11:53

## 2022-12-09 RX ADMIN — METRONIDAZOLE 500 MG: 500 INJECTION, SOLUTION INTRAVENOUS at 15:01

## 2022-12-09 RX ADMIN — FENTANYL CITRATE 25 MCG: 50 INJECTION, SOLUTION INTRAMUSCULAR; INTRAVENOUS at 13:47

## 2022-12-09 NOTE — PERIOP NOTES
Endoscopy Case End Note:    1145:  Procedure scope was pre-cleaned, per protocol, at bedside by YESENIA Keys and ZUNILDA Saucedo. 1200:  Report received from [address] [place] [address] Floor, [facility]. See anesthesia flowsheet for intra-procedure vital signs and events. 1202:  Pillow and blanket returned to patient.

## 2022-12-09 NOTE — PROGRESS NOTES
TRANSFER - OUT REPORT:    Verbal report given to LYLY(name) on [place]  being transferred to [facility]) for routine progression of care       Report consisted of patients Situation, Background, Assessment and   Recommendations(SBAR). Information from the following report(s) Procedure Summary, Intake/Output, MAR, Pre Procedure Checklist, and Procedure Verification was reviewed with the receiving nurse. Lines:   Peripheral IV 12/07/22 Distal;Left;Posterior Forearm (Active)   Site Assessment Clean, dry, & intact 12/09/22 1201   Phlebitis Assessment 0 12/09/22 1201   Infiltration Assessment 0 12/09/22 1201   Dressing Status Clean, dry, & intact 12/09/22 1201   Dressing Type Transparent 12/09/22 1201   Hub Color/Line Status Infusing 12/09/22 1201   Action Taken Dressing reinforced 12/08/22 1957   Alcohol Cap Used Yes 12/08/22 1957       Peripheral IV 12/08/22 Anterior; Left Forearm (Active)   Site Assessment Clean, dry, & intact 12/09/22 1050   Phlebitis Assessment 0 12/09/22 1050   Infiltration Assessment 0 12/09/22 1050   Dressing Status Clean, dry, & intact 12/08/22 1957   Dressing Type Transparent 12/08/22 1957   Hub Color/Line Status Pink 12/08/22 1957   Action Taken Other (comment) 12/08/22 1957   Alcohol Cap Used Yes 12/08/22 0829        Opportunity for questions and clarification was provided.

## 2022-12-09 NOTE — PROGRESS NOTES
[address] December 9, 2022       RE: Yobany Thomas      To Whom It May Concern,    This is to certify that Yobany Thomas was hospitalized between 12/7 and 12/9/2022 and may return to work on 12/19/22 or sooner if she is feeling up to it. Please feel free to contact my office if you have any questions or concerns. Thank you for your assistance in this matter.       Sincerely,  LORENE Dean

## 2022-12-09 NOTE — PROGRESS NOTES
Sathya Byrd  1960  [id number]    Situation:  Verbal report received from: CHRISTOPHER Aguilar  Procedure: Procedure(s):  COLONOSCOPY  ENDOSCOPIC POLYPECTOMY    Background:    Preoperative diagnosis: abnormal CT scan  Postoperative diagnosis: polyp, diverticulosis    :  Dr. Nara Pool  Assistant(s): Endoscopy Technician-1: Karrie Ricks  Endoscopy RN-1: Josselyn Stephen RN  Endoscopy RN-2: Nic Hoff RN    Specimens:   ID Type Source Tests Collected by Time Destination   1 : rectal polyp Preservative Rectal  Cedric Hammond MD 12/9/2022 1155 Pathology     H. Pylori  no    Assessment:  Intra-procedure medications     Anesthesia gave intra-procedure sedation and medications, see anesthesia flow sheet yes    Intravenous fluids: NS@ KVO     Vital signs stable yes    Abdominal assessment: round and soft yes    Recommendation:  Discharge patient per MD order no.   Return to floor yes  Family or olaf Conde  Permission to share finding with family or friend yes

## 2022-12-09 NOTE — PROCEDURES
NAME:  Nona Marte   :   1960   MRN:   [id number]     Date/Time:  2022 11:55 AM    Colonoscopy Operative Report    Procedure Type:  Colonoscopy with polypectomy (cold biopsy)     Indications:  abnormal CT colon  Pre-operative Diagnosis: see indication above  Post-operative Diagnosis:  See findings below  :  Timothy Salazar MD  Referring Provider: Shyla Serrano MD    Exam:  Airway: clear, no airway problems anticipated  Heart: RRR, without gallops or rubs  Lungs: clear bilaterally without wheezes, crackles, or rhonchi  Abdomen: soft, nontender, nondistended, bowel sounds present  Mental Status: awake, alert and oriented to person, place and time    Sedation:  MAC anesthesia Propofol  Procedure Details:  After informed consent was obtained with all risks and benefits of procedure explained and preoperative exam completed, the patient was taken to the endoscopy suite and placed in the left lateral decubitus position. Upon sequential sedation as per above, a digital rectal exam was performed demonstrating internal and external hemorrhoids. The Olympus videocolonoscope  was inserted in the rectum and carefully advanced to the terminal ileum. The quality of preparation was good. The colonoscope was slowly withdrawn with careful evaluation between folds. Retroflexion in the rectum was completed demonstrating internal and external hemorrhoids. Findings:   ANUS: Anal exam reveals no masses or hemorrhoids, sphincter tone is normal.   RECTUM: Rectal exam reveals no masses or hemorrhoids. There is one small polyp, 0.2 cm in size, removed with cold forceps. SIGMOID COLON:   -- diverticulosis, with edematous mucosa and luminal narrowing, consistent with healing diverticulitis. -- no discrete mass or lesion present  DESCENDING COLON: mild diverticulosis, otherwise normal.  TRANSVERSE COLON: The mucosa is normal with good vascular pattern and without ulcers, diverticula, and polyps. ASCENDING COLON: The mucosa is normal with good vascular pattern and without ulcers, diverticula, and polyps. CECUM: The appendiceal orifice appears normal. The ileocecal valve appears normal.   TERMINAL ILEUM: The terminal ileum was normal.       Specimen Removed:  rectal polyp  Complications:  None. EBL:     None. Impression:  -- small rectal polyp, removed as above  -- diverticulosis, left-sided, with edematous mucosa and luminal narrowing in sigmoid, consistent with healing diverticulitis. -- no mass or lesion to explain liver findings. Recommendations:   -- await pathology  -- resume diet  -- follow up in office  -- repeat colonoscopy in 5-10 years pending results  -- GI will sign off. Discharge Disposition:  Home in the company of a  when able to ambulate.     Ramon Parisi MD

## 2022-12-09 NOTE — PROGRESS NOTES
End of Shift Note    Bedside shift change report given to Jerrica Andrade (oncoming nurse) by Mark Hummel RN (offgoing nurse). Report included the following information SBAR, Kardex, Intake/Output, MAR, Recent Results, and Cardiac Rhythm NSR    Shift worked:  NIGHT     Shift summary and any significant changes:     Pt had to move a lot at night due to col prep. By midnight she was passing clear yellow stool and was able to stay NPO for both liver biopsy and coloscopy today. Ct'd with IV fluids as well as A/biotics . VS, though Temp has been high at times are  within normal limits; Soft BP as well. Labs have been drawn. Consents not signed  yet   Concerns for physician to address:       Zone phone for oncoming shift:          Activity:  Activity Level: Up with Assistance, Bed Rest  Number times ambulated in hallways past shift: 0  Number of times OOB to chair past shift: 5    Cardiac:   Cardiac Monitoring: Yes      Cardiac Rhythm: Sinus Rhythm    Access:  Current line(s): PIV     Genitourinary:   Urinary status: voiding and incontinent    Respiratory:   O2 Device: None (Room air)  Chronic home O2 use?: NO  Incentive spirometer at bedside: NO       GI:  Last Bowel Movement Date: 12/08/22  Current diet:  DIET NPO  Passing flatus: YES  Tolerating current diet: NO       Pain Management:   Patient states pain is manageable on current regimen: YES    Skin:  Gurmeet Score: 16  Interventions: limit briefs    Patient Safety:  Fall Score:  Total Score: 2  Interventions: gripper socks and pt to call before getting OOB       Length of Stay:  Expected LOS: 2d 14h  Actual LOS: 2      Mark Hummel, RN

## 2022-12-09 NOTE — PERIOP NOTES
Endoscopy Case End Note:    ***:  Procedure scope was pre-cleaned, per protocol, at bedside by ***.      ***:  Report received from anesthesia - ***. See anesthesia flowsheet for intra-procedure vital signs and events. ***:  bLANKET AND PILLOW returned to patient.

## 2022-12-09 NOTE — H&P
Date of Surgery Update:  Anamaria Rodgers was seen and examined. History and physical has been reviewed. The patient has been examined.  There have been no significant clinical changes since the completion of the originally dated History and Physical.    Signed By: Vika Elias MD     December 9, 2022 11:30 AM

## 2022-12-09 NOTE — PROGRESS NOTES
Patient normotensive, A&Ox 4, and in NAD on room air, patient has no complaints of discomfort or pain at this time. VSS and WDL. Report called to Oncology for patient to return to room 1136. Patient to return to room with transport    Report given utilizing SBAR format, opportunities given for questions. Call back number (#[phone]).

## 2022-12-09 NOTE — PROGRESS NOTES
Problem: Falls - Risk of  Goal: *Absence of Falls  Description: Document Carina Peralta Fall Risk and appropriate interventions in the flowsheet.   Outcome: Resolved/Met  Note: Fall Risk Interventions:  Mobility Interventions: Bed/chair exit alarm         Medication Interventions: Teach patient to arise slowly, Bed/chair exit alarm    Elimination Interventions: Call light in reach, Bed/chair exit alarm              Problem: Patient Education: Go to Patient Education Activity  Goal: Patient/Family Education  Outcome: Resolved/Met     Problem: Patient Education: Go to Patient Education Activity  Goal: Patient/Family Education  Outcome: Resolved/Met

## 2022-12-09 NOTE — PROGRESS NOTES
Patient transferred to endoscopy via stretcher. Patient A&Ox4 and on room air. VSS. Patient has personal pillow, blanket, and tele box with her.

## 2022-12-09 NOTE — PROGRESS NOTES
[facility] [place] at [address], [place], [address] [facility]  W: [phone] F: [phone]    Patient seen at bedside today, has liver biopsy and colonoscopy planned today. Will arrange close OP follow up after DC. She is hopeful to be discharged after procedures today.  Discussed with Hospitalist team.     Non billable round

## 2022-12-09 NOTE — PROGRESS NOTES
Name of Procedure: image guided liver biopsy with moderate sedation     Sedation medications given: Fentanyl and Versed     Versed: 2 mg     Fentanyl:  75  mcg     Sedation Tolerated: well     Total Sedation Time: 35 minutes     Sedation Start: 1310  Sedation End:  1345     Vital Signs:  VSS throughout procedure     Fluids Removed: tissue sample     Samples sent to lab: AFB culture, pathology, fungal culture, tissue culture     Any complications related to procedure: none     Patient is A&Ox4 and is in NAD at this time. Report to be given to inpatient floor after recovery period is complete.

## 2022-12-09 NOTE — H&P
Interventional and Vascular Radiology History and Physical    Patient: Kiley Portillo 63 y.o. female       Chief Complaint: Hepatic metastatic disease     History of Present Illness: 63year old female presents for liver biopsy with US guidance. History:    Past Medical History:   Diagnosis Date    High cholesterol     Infectious disease     Other ill-defined conditions(589.89)     fluid build up in chest     History reviewed. No pertinent family history. Social History     Socioeconomic History    Marital status:      Spouse name: Not on file    Number of children: Not on file    Years of education: Not on file    Highest education level: Not on file   Occupational History    Not on file   Tobacco Use    Smoking status: Every Day     Packs/day: 0.50     Types: Cigarettes    Smokeless tobacco: Not on file   Substance and Sexual Activity    Alcohol use: Yes     Comment: about 4-6 beers per week    Drug use: Yes     Types: Marijuana     Comment: pt jeromy Fleming yesterday    Sexual activity: Not on file   Other Topics Concern    Not on file   Social History Narrative    Not on file     Social Determinants of Health     Financial Resource Strain: Not on file   Food Insecurity: Not on file   Transportation Needs: Not on file   Physical Activity: Not on file   Stress: Not on file   Social Connections: Not on file   Intimate Partner Violence: Not on file   Housing Stability: Not on file       Allergies:    Allergies   Allergen Reactions    Erythromycin Other (comments)     \"Chest Pain\"    Pcn [Penicillins] Rash    Sulfa (Sulfonamide Antibiotics) Rash       Current Medications:  Current Facility-Administered Medications   Medication Dose Route Frequency    0.9% sodium chloride infusion  25 mL/hr IntraVENous CONTINUOUS    sodium chloride (NS) flush 5-40 mL  5-40 mL IntraVENous Q8H    sodium chloride (NS) flush 5-40 mL  5-40 mL IntraVENous PRN    naloxone (NARCAN) injection 0.4 mg  0.4 mg IntraVENous Multiple flumazeniL (ROMAZICON) 0.1 mg/mL injection 0.2 mg  0.2 mg IntraVENous Multiple    simethicone (MYLICON) 94ZH/4.9YH oral drops 80 mg  1.2 mL Oral Multiple    atropine injection 0.5 mg  0.5 mg IntraVENous ONCE PRN    EPINEPHrine (ADRENALIN) 0.1 mg/mL syringe 1 mg  1 mg Endoscopically ONCE PRN    midazolam (VERSED) injection 1-5 mg  1-5 mg IntraVENous Multiple    fentaNYL citrate (PF) injection  mcg   mcg IntraVENous Rad Multiple    0.9% sodium chloride with KCl 20 mEq/L infusion   IntraVENous CONTINUOUS    LORazepam (ATIVAN) tablet 2 mg  2 mg Oral Q4H PRN    metroNIDAZOLE (FLAGYL) IVPB premix 500 mg  500 mg IntraVENous Q8H    sodium chloride (NS) flush 5-40 mL  5-40 mL IntraVENous Q8H    sodium chloride (NS) flush 5-40 mL  5-40 mL IntraVENous PRN    acetaminophen (TYLENOL) tablet 650 mg  650 mg Oral Q6H PRN    Or    acetaminophen (TYLENOL) suppository 650 mg  650 mg Rectal Q6H PRN    polyethylene glycol (MIRALAX) packet 17 g  17 g Oral DAILY PRN    ondansetron (ZOFRAN ODT) tablet 4 mg  4 mg Oral Q8H PRN    Or    ondansetron (ZOFRAN) injection 4 mg  4 mg IntraVENous Q6H PRN    levoFLOXacin (LEVAQUIN) 750 mg in D5W IVPB  750 mg IntraVENous Q24H    oxyCODONE IR (ROXICODONE) tablet 5 mg  5 mg Oral Q4H PRN    melatonin tablet 3 mg  3 mg Oral QHS PRN    morphine injection 1 mg  1 mg IntraVENous Q4H PRN    diazePAM (VALIUM) injection 5 mg  5 mg IntraVENous Q1H PRN    diazePAM (VALIUM) injection 10 mg  10 mg IntraVENous M9P PRN    folic acid (FOLVITE) tablet 1 mg  1 mg Oral DAILY    thiamine mononitrate (B-1) tablet 100 mg  100 mg Oral DAILY    multivitamin, tx-iron-ca-min (THERA-M w/ IRON) tablet 1 Tablet  1 Tablet Oral DAILY    hydrALAZINE (APRESOLINE) 20 mg/mL injection 10 mg  10 mg IntraVENous Q6H PRN        Physical Exam:  Blood pressure 104/68, pulse 80, temperature 98.6 °F (37 °C), resp. rate 16, height 5' 4\" (1.626 m), weight 77.1 kg (170 lb), SpO2 100 %, not currently breastfeeding.   No acute distress  Good peripheral perfusion  Nonlabored respirations  Abdomen nondistended    Alerts:    Hospital Problems  Date Reviewed: 12/8/2022            Codes Class Noted POA    Influenza-like illness ICD-10-CM: J11.1  ICD-9-CM: 487.1  12/7/2022 Unknown        Liver mass ICD-10-CM: R16.0  ICD-9-CM: 573.8  12/7/2022 Unknown           Laboratory:      Recent Labs     12/09/22  0436 12/08/22  1801   HGB 11.8  --    HCT 35.9  --    WBC 26.3*  --      --    INR  --  1.2*   BUN 6  --    CREA 0.51*  --    K 3.0*  3.4*  --          Plan of Care/Planned Procedure:  Risks, benefits, and alternatives reviewed with patient and she agrees to proceed with the procedure. Conscious sedation will be performed with IV fentanyl and versed.        Adela Chambers MD

## 2022-12-09 NOTE — PROGRESS NOTES
1220 Report called to Olu Mirza RN in IR.    1225 Pt off bp, voided 150cc, kathrin care, pads changed

## 2022-12-09 NOTE — PROGRESS NOTES
.I have reviewed discharge instructions with the PATIENT PARENT GUARDIAN: patient. The patient verbalized understanding. Discharge medications reviewed with patient and appropriate educational materials and side effects teaching were provided. Follow-up appointments reviewed. Opportunity for questions and clarification was provided. Venous access removed without difficulty. Patient's belongings gathered and sent with patient. Patient is ready for discharge.      Illene Blizzard, RN

## 2022-12-09 NOTE — PROGRESS NOTES
Interventional Radiology  Procedure Note        12/9/2022 9:41 AM    Patient: Melba Royal     Diagnosis: Suspected intrahepatic abscesses     Procedure(s): US guided aspiration and biopsy of intrahepatic lesions     Specimens removed:   multiple 18-g core needle biopsies of segment 2 hepatic lesion  2 ml of viscous greenish fluid from segment 5 lesion    Informed Consent: Obtained    Sedation: Moderate    Complications: None    Assessment:  Findings reveal complex fluid collection in he right hepatic lobe corresponding to the larges lesion on recent CT. Viscous malodorous fluid was aspirated most consistent with abscess. Samples sent for analysis. Core biopsy of segment 2 lesion revealed inflammatory tissue and neutrophils but no evidence of malignancy.      Plan:  - overall findings suggestive of multiple intrahepatic abscesses, which is concordant with no mass lesion identified on colonoscopy   - the largest collection in the right hepatic lobe is amenable to percutaneous catheter placement  - recommend further evaluation with MRI   - full dictation to follow    -------------------------------  Caroline Mancini MD  Vascular and Interventional Radiology

## 2022-12-09 NOTE — PROGRESS NOTES
End of Shift Note    Bedside shift change report given to CHRISTOPHER Jara (oncoming nurse) by Jamie Samson RN (offgoing nurse). Report included the following information SBAR, Kardex, MAR, Recent Results, and Cardiac Rhythm Sinus Rhythm    Shift worked:  7 am to 7:30 pm     Shift summary and any significant changes: All scheduled medications administered. Patient's K 2.2; started on IVF with 20 mEq K at 100 mL, 3 runs IV K and 1 dose PO K administered, see MAR. BMP redrawn at 1500 with K 3.0; 1 run IV Mag, 2 runs IV K and 1 dose PO K administered, see MAR. Lactic acid, Procalcitonin, and Tumor markers labs drawn. Patient started on bowel prep at 1700; patient aware of need to be NPO at midnight. 1 dose of IV Zofran administered due to vomiting after starting bowel prep; post Zofran, patient able to tolerate prep. 1 dose of Tylenol administered for 100.4 F fever. Oncology consult completed; patient aware of liver biopsy scheduled for tomorrow. IV's flushed and patent. Bath, nursing rounds and education completed. Concerns for physician to address:  Patient's daughter, Sofya Torres, would like a call with updates on all procedures and treatment plans; her number is in the chart. Zone phone for oncoming shift:          Activity:  Activity Level: Up with Assistance, Bed Rest  Number times ambulated in hallways past shift: 0  Number of times OOB to chair past shift: 2    Cardiac:   Cardiac Monitoring: Yes      Cardiac Rhythm: Sinus Rhythm    Access:  Current line(s): PIV     Genitourinary:   Urinary status: voiding    Respiratory:   O2 Device: None (Room air)  Chronic home O2 use?: NO  Incentive spirometer at bedside: NO       GI:  Last Bowel Movement Date: 12/08/22 (per pt)  Current diet:  DIET NPO  ADULT DIET Clear Liquid;  No Red Dye  Passing flatus: YES  Tolerating current diet: YES       Pain Management:   Patient states pain is manageable on current regimen: YES    Skin:  Gurmeet Score: 19  Interventions: increase time out of bed    Patient Safety:  Fall Score:  Total Score: 2  Interventions: gripper socks and pt to call before getting OOB       Length of Stay:  Expected LOS: 2d 14h  Actual LOS: 200 Jc Cooney RN

## 2022-12-09 NOTE — DISCHARGE SUMMARY
Hospitalist Discharge Summary     Patient ID:    Nick Phan  [id number]  23 y.o.  1960    Admit date: 12/7/2022    Discharge date : 12/9/2022    Final Diagnoses: Active Problems:    Influenza-like illness (12/7/2022)      Liver mass (12/7/2022)        Reason for Hospitalization:  flu like symptoms    Hospital Course:   Ms. Maria Teresa Ann is a 63year old female with no significant medical history who presented to ED with approx 2 week history of subjective fevers, diarrhea, vomiting. She also endorsed weight loss (7 lbs in ~2 weeks), fatigue, weakness, and occasional shortness of breath. No known sick contacts. She was afebrile but mildly tachycardic 110 on admit. Her labs were remarkable for WBC 21.4, Na 126, K 2.6, ALT 10.2, AST 64, alk phos 169. Tested (-) for flu, RSV, COVID19, and mononucleosis. Blood cultures (-), UA C&S (-), and MRSA swab (-) so empiric levaquin and flagyl were discontinued. CT chest showed nonspecific pulmonary nodules, aneurysmal dilation of ascending aorta, and multiple liver masses; CT abd/pelvis showed mild sigmoid diverticulitis and multiple mass lesions within liver compatible with metastatic disease. GI consulted and concerned for colon malignancy over diverticulitis so pt underwent colonoscopy on 12/9, polyp found but no mass or malignancy. Hypokalemia/hypomagnesemia/hyponatremia were repleted and normalized. Oncology consulted and ordered liver biopsy 12/9, results pending. Both GI and oncology have cleared pt for discharge home and outpatient follow up for biopsy results, and pt wishes to go home. Pt will go with RX for antiemetic, and recommend addition of folic acid and thiamine due to recent alcohol cessation, tylenol PRN for oncological fever > 100.4. OK for discharge tonight from medical standpoint after dinner if no vomiting.     Discharge Medications:   Current Discharge Medication List        START taking these medications Details   acetaminophen (TYLENOL) 325 mg tablet Take 2 Tablets by mouth every six (6) hours as needed for Pain or Fever for up to 30 days. Qty: 120 Tablet, Refills: 0  Start date: 12/9/2022, End date: 3/7/5283      folic acid (FOLVITE) 1 mg tablet Take 1 Tablet by mouth daily for 30 days. Qty: 30 Tablet, Refills: 0  Start date: 12/10/2022, End date: 1/9/2023      thiamine mononitrate (B-1) 100 mg tablet Take 1 Tablet by mouth daily for 30 days. Qty: 30 Tablet, Refills: 0  Start date: 12/10/2022, End date: 1/9/2023      melatonin 3 mg tablet Take 1 Tablet by mouth nightly as needed for Insomnia. Qty: 30 Tablet, Refills: 0  Start date: 12/9/2022      ondansetron hcl (Zofran) 4 mg tablet Take 1 Tablet by mouth every eight (8) hours as needed for Nausea. Qty: 20 Tablet, Refills: 0  Start date: 12/7/2022      naproxen (Naprosyn) 500 mg tablet Take 1 Tablet by mouth two (2) times daily (with meals) for 10 days. Qty: 20 Tablet, Refills: 0  Start date: 12/7/2022, End date: 12/17/2022           CONTINUE these medications which have NOT CHANGED    Details   hydrochlorothiazide (HYDRODIURIL) 25 mg tablet Take 25 mg by mouth daily. STOP taking these medications       ibuprofen (MOTRIN) 600 mg tablet Comments:   Reason for Stopping: Follow up Care:    1. Leandro Wild MD in 1-2 weeks.       Follow-up Information       Follow up With Specialties Details Why Contact Info    Leandro Wild MD [facility] Medicine Schedule an appointment as soon as possible for a visit in 2 days  [address]  [phone]      [facility] EMERGENCY DEPT Emergency Medicine  As needed, If symptoms worsen [address] [facility] [address]    Ravin Lopez MD Gastroenterology Schedule an appointment as soon as possible for a visit in 1 week(s)  [id number] [address]  [address] [facility]      Samson Valentine MD Hematology and Oncology, Internal Medicine Physician, Hematology Physician, Oncology Follow up in 1 week(s) Follow up for liver biopsy results [address]  [facility]  [phone]                * Follow-up Care/Patient Instructions: Activity: Activity as tolerated  Diet: Regular Diet  Wound Care: None needed      Condition at Discharge:  Stable  __________________________________________________________________    Disposition  Home or Self Care  ____________________________________________________________________    Code Status:  Full Code  ___________________________________________________________________    Discharge Exam:  Patient seen and examined by me on discharge day. Physical Exam  Constitutional:       Appearance: Normal appearance. HENT:      Head: Normocephalic. Mouth/Throat:      Mouth: Mucous membranes are moist.   Eyes:      Pupils: Pupils are equal, round, and reactive to light. Cardiovascular:      Rate and Rhythm: Normal rate and regular rhythm. Pulmonary:      Effort: Pulmonary effort is normal.      Breath sounds: Normal breath sounds. Abdominal:      General: Bowel sounds are normal.      Palpations: Abdomen is soft. Skin:     General: Skin is warm and dry. Neurological:      Mental Status: She is alert and oriented to person, place, and time.         CONSULTATIONS: GI, Oncology    Significant Diagnostic Studies:   Recent Results (from the past 24 hour(s))   PROCALCITONIN    Collection Time: 12/08/22  6:01 PM   Result Value Ref Range    Procalcitonin 1.14 ng/mL   LACTIC ACID    Collection Time: 12/08/22  6:01 PM   Result Value Ref Range    Lactic acid 1.1 0.4 - 2.0 MMOL/L   PROTHROMBIN TIME + INR    Collection Time: 12/08/22  6:01 PM   Result Value Ref Range    INR 1.2 (H) 0.9 - 1.1      Prothrombin time 12.4 (H) 9.0 - 11.1 sec   POTASSIUM    Collection Time: 12/09/22  4:36 AM   Result Value Ref Range    Potassium 3.4 (L) 3.5 - 5.1 mmol/L   METABOLIC PANEL, BASIC    Collection Time: 12/09/22  4:36 AM   Result Value Ref Range    Sodium 136 136 - 145 mmol/L    Potassium 3.0 (L) 3.5 - 5.1 mmol/L    Chloride 98 97 - 108 mmol/L    CO2 28 21 - 32 mmol/L    Anion gap 10 5 - 15 mmol/L    Glucose 88 65 - 100 mg/dL    BUN 6 6 - 20 MG/DL    Creatinine 0.51 (L) 0.55 - 1.02 MG/DL    BUN/Creatinine ratio 12 12 - 20      eGFR >60 >60 ml/min/1.73m2    Calcium 8.3 (L) 8.5 - 10.1 MG/DL   CBC W/O DIFF    Collection Time: 12/09/22  4:36 AM   Result Value Ref Range    WBC 26.3 (H) 3.6 - 11.0 K/uL    RBC 3.85 3.80 - 5.20 M/uL    HGB 11.8 11.5 - 16.0 g/dL    HCT 35.9 35.0 - 47.0 %    MCV 93.2 80.0 - 99.0 FL    MCH 30.6 26.0 - 34.0 PG    MCHC 32.9 30.0 - 36.5 g/dL    RDW 14.0 11.5 - 14.5 %    PLATELET 932 611 - 521 K/uL    MPV 10.6 8.9 - 12.9 FL    NRBC 0.0 0  WBC    ABSOLUTE NRBC 0.00 0.00 - 0.01 K/uL   MAGNESIUM    Collection Time: 12/09/22  4:36 AM   Result Value Ref Range    Magnesium 2.0 1.6 - 2.4 mg/dL   PTT    Collection Time: 12/09/22  4:36 AM   Result Value Ref Range    aPTT 26.1 22.1 - 31.0 sec    aPTT, therapeutic range     58.0 - 77.0 SECS     US GUIDE BX HANNAH PERC   Final Result   1. Overall findings suspicious of multiple intrahepatic abscesses rather than   metastatic disease. 2.  Ultrasound-guided biopsy of right and left hepatic lobe lesions. Ultrasound-guided aspiration of right hepatic lobe lesion. No immediate   complications. Pathology results pending. CT CHEST W CONT   Final Result   1. Small nonspecific pulmonary nodules. Attention on follow-up imaging. 2.  Aneurysmal dilatation of the ascending aorta. 3.  Multiple liver masses are again identified. CT ABD PELV W CONT   Final Result   Mild sigmoid diverticulitis. Multiple mass lesions are noted within the liver   compatible with metastatic disease. XR CHEST PA LAT   Final Result      Normal PA and lateral chest views.              Discharge: time spent greater than 35 minutes in discharge  Education and counseling.      Signed:  Dante Hummel, APRN  12/9/2022  5:12 PM

## 2022-12-09 NOTE — ANESTHESIA POSTPROCEDURE EVALUATION
Procedure(s):  COLONOSCOPY  ENDOSCOPIC POLYPECTOMY. general, total IV anesthesia    Anesthesia Post Evaluation        Patient location during evaluation: PACU  Note status: Adequate. Level of consciousness: responsive to verbal stimuli and sleepy but conscious  Pain management: satisfactory to patient  Airway patency: patent  Anesthetic complications: no  Cardiovascular status: acceptable  Respiratory status: acceptable  Hydration status: acceptable  Comments: +Post-Anesthesia Evaluation and Assessment    Patient: Juvenal Lares MRN: [id number]  SSN: [id number]   YOB: 1960  Age: 63 y.o. Sex: female      Cardiovascular Function/Vital Signs    BP (!) 87/58   Pulse 80   Temp 36.7 °C (98.1 °F)   Resp 28   Ht 5' 4\" (1.626 m)   Wt 77.1 kg (170 lb)   SpO2 98%   Breastfeeding No   BMI 29.18 kg/m²     Patient is status post Procedure(s):  COLONOSCOPY  ENDOSCOPIC POLYPECTOMY. Nausea/Vomiting: Controlled. Postoperative hydration reviewed and adequate. Pain:  Pain Scale 1: Numeric (0 - 10) (12/09/22 1201)  Pain Intensity 1: 0 (12/09/22 1201)   Managed. Neurological Status: At baseline. Mental Status and Level of Consciousness: Arousable. Pulmonary Status:   O2 Device: None (Room air) (12/09/22 1201)   Adequate oxygenation and airway patent. Complications related to anesthesia: None    Post-anesthesia assessment completed. No concerns. Signed By: Fabi Lubin MD    12/9/2022  Post anesthesia nausea and vomiting:  controlled      INITIAL Post-op Vital signs:   Vitals Value Taken Time   BP 97/77 12/09/22 1212   Temp     Pulse 83 12/09/22 1214   Resp 28 12/09/22 1214   SpO2 97 % 12/09/22 1214   Vitals shown include unvalidated device data.

## 2022-12-10 LAB
AFP-TM SERPL-MCNC: 2 NG/ML (ref 0–9.2)
BACTERIA SPEC CULT: NORMAL
GRAM STN SPEC: NORMAL
GRAM STN SPEC: NORMAL
SERVICE CMNT-IMP: NORMAL

## 2022-12-11 LAB
BACTERIA SPEC CULT: NORMAL
SERVICE CMNT-IMP: NORMAL

## 2022-12-12 LAB
BACTERIA SPEC CULT: NORMAL
SERVICE CMNT-IMP: NORMAL

## 2022-12-13 LAB
ACID FAST STN SPEC: NEGATIVE
BACTERIA SPEC CULT: ABNORMAL
BACTERIA SPEC CULT: NORMAL
GRAM STN SPEC: ABNORMAL
GRAM STN SPEC: ABNORMAL
MYCOBACTERIUM SPEC QL CULT: NORMAL
SERVICE CMNT-IMP: ABNORMAL
SERVICE CMNT-IMP: NORMAL
SPECIMEN PREPARATION: NORMAL
SPECIMEN SOURCE: NORMAL

## 2022-12-16 ENCOUNTER — TRANSCRIBE ORDER (OUTPATIENT)
Dept: SCHEDULING | Age: 62
End: 2022-12-16

## 2022-12-16 ENCOUNTER — APPOINTMENT (OUTPATIENT)
Dept: CT IMAGING | Age: 62
End: 2022-12-16
Attending: STUDENT IN AN ORGANIZED HEALTH CARE EDUCATION/TRAINING PROGRAM
Payer: COMMERCIAL

## 2022-12-16 ENCOUNTER — HOSPITAL ENCOUNTER (INPATIENT)
Age: 62
LOS: 7 days | Discharge: HOME HEALTH | End: 2022-12-23
Attending: STUDENT IN AN ORGANIZED HEALTH CARE EDUCATION/TRAINING PROGRAM | Admitting: FAMILY MEDICINE
Payer: COMMERCIAL

## 2022-12-16 ENCOUNTER — APPOINTMENT (OUTPATIENT)
Dept: GENERAL RADIOLOGY | Age: 62
End: 2022-12-16
Attending: FAMILY MEDICINE
Payer: COMMERCIAL

## 2022-12-16 DIAGNOSIS — A49.9 GRAM-NEGATIVE INFECTION: ICD-10-CM

## 2022-12-16 DIAGNOSIS — K57.20 COLONIC DIVERTICULAR ABSCESS: ICD-10-CM

## 2022-12-16 DIAGNOSIS — K75.0 LIVER ABSCESS: ICD-10-CM

## 2022-12-16 DIAGNOSIS — K57.32 SIGMOID DIVERTICULITIS: ICD-10-CM

## 2022-12-16 DIAGNOSIS — F32.A ANXIETY AND DEPRESSION: ICD-10-CM

## 2022-12-16 DIAGNOSIS — F41.9 ANXIETY AND DEPRESSION: ICD-10-CM

## 2022-12-16 DIAGNOSIS — D72.825 BANDEMIA: ICD-10-CM

## 2022-12-16 DIAGNOSIS — K75.0 LIVER ABSCESS: Primary | ICD-10-CM

## 2022-12-16 DIAGNOSIS — K57.32 DIVERTICULITIS, COLON: Primary | ICD-10-CM

## 2022-12-16 DIAGNOSIS — R50.9 FEVER, UNSPECIFIED FEVER CAUSE: ICD-10-CM

## 2022-12-16 DIAGNOSIS — A49.8 ANAEROBIC BACTERIAL INFECTION: ICD-10-CM

## 2022-12-16 DIAGNOSIS — F10.11 HISTORY OF ALCOHOL ABUSE: ICD-10-CM

## 2022-12-16 LAB
ALBUMIN SERPL-MCNC: 2.2 G/DL (ref 3.5–5)
ALBUMIN/GLOB SERPL: 0.3 {RATIO} (ref 1.1–2.2)
ALP SERPL-CCNC: 145 U/L (ref 45–117)
ALT SERPL-CCNC: 33 U/L (ref 12–78)
ANION GAP SERPL CALC-SCNC: 5 MMOL/L (ref 5–15)
AST SERPL-CCNC: 45 U/L (ref 15–37)
BASOPHILS # BLD: 0 K/UL (ref 0–0.1)
BASOPHILS NFR BLD: 0 % (ref 0–1)
BILIRUB SERPL-MCNC: 1 MG/DL (ref 0.2–1)
BUN SERPL-MCNC: 9 MG/DL (ref 6–20)
BUN/CREAT SERPL: 14 (ref 12–20)
CALCIUM SERPL-MCNC: 9.3 MG/DL (ref 8.5–10.1)
CHLORIDE SERPL-SCNC: 95 MMOL/L (ref 97–108)
CO2 SERPL-SCNC: 32 MMOL/L (ref 21–32)
COMMENT, HOLDF: NORMAL
CREAT SERPL-MCNC: 0.66 MG/DL (ref 0.55–1.02)
DIFFERENTIAL METHOD BLD: ABNORMAL
EOSINOPHIL # BLD: 0 K/UL (ref 0–0.4)
EOSINOPHIL NFR BLD: 0 % (ref 0–7)
ERYTHROCYTE [DISTWIDTH] IN BLOOD BY AUTOMATED COUNT: 13.8 % (ref 11.5–14.5)
GLOBULIN SER CALC-MCNC: 6.4 G/DL (ref 2–4)
GLUCOSE SERPL-MCNC: 116 MG/DL (ref 65–100)
HCT VFR BLD AUTO: 37.7 % (ref 35–47)
HGB BLD-MCNC: 12.3 G/DL (ref 11.5–16)
IMM GRANULOCYTES # BLD AUTO: 0.2 K/UL (ref 0–0.04)
IMM GRANULOCYTES NFR BLD AUTO: 1 % (ref 0–0.5)
LACTATE SERPL-SCNC: 0.8 MMOL/L (ref 0.4–2)
LYMPHOCYTES # BLD: 1.8 K/UL (ref 0.8–3.5)
LYMPHOCYTES NFR BLD: 9 % (ref 12–49)
MCH RBC QN AUTO: 31.4 PG (ref 26–34)
MCHC RBC AUTO-ENTMCNC: 32.6 G/DL (ref 30–36.5)
MCV RBC AUTO: 96.2 FL (ref 80–99)
MONOCYTES # BLD: 0.8 K/UL (ref 0–1)
MONOCYTES NFR BLD: 4 % (ref 5–13)
NEUTS SEG # BLD: 17 K/UL (ref 1.8–8)
NEUTS SEG NFR BLD: 86 % (ref 32–75)
NRBC # BLD: 0 K/UL (ref 0–0.01)
NRBC BLD-RTO: 0 PER 100 WBC
PLATELET # BLD AUTO: 694 K/UL (ref 150–400)
PMV BLD AUTO: 9.9 FL (ref 8.9–12.9)
POTASSIUM SERPL-SCNC: 4.8 MMOL/L (ref 3.5–5.1)
PROT SERPL-MCNC: 8.6 G/DL (ref 6.4–8.2)
RBC # BLD AUTO: 3.92 M/UL (ref 3.8–5.2)
RBC MORPH BLD: ABNORMAL
SAMPLES BEING HELD,HOLD: NORMAL
SODIUM SERPL-SCNC: 132 MMOL/L (ref 136–145)
WBC # BLD AUTO: 19.8 K/UL (ref 3.6–11)

## 2022-12-16 PROCEDURE — 65270000029 HC RM PRIVATE

## 2022-12-16 PROCEDURE — 36415 COLL VENOUS BLD VENIPUNCTURE: CPT

## 2022-12-16 PROCEDURE — 74011000250 HC RX REV CODE- 250: Performed by: STUDENT IN AN ORGANIZED HEALTH CARE EDUCATION/TRAINING PROGRAM

## 2022-12-16 PROCEDURE — 74011250637 HC RX REV CODE- 250/637: Performed by: FAMILY MEDICINE

## 2022-12-16 PROCEDURE — 99285 EMERGENCY DEPT VISIT HI MDM: CPT

## 2022-12-16 PROCEDURE — 74011250636 HC RX REV CODE- 250/636: Performed by: STUDENT IN AN ORGANIZED HEALTH CARE EDUCATION/TRAINING PROGRAM

## 2022-12-16 PROCEDURE — 87040 BLOOD CULTURE FOR BACTERIA: CPT

## 2022-12-16 PROCEDURE — 74011000636 HC RX REV CODE- 636: Performed by: RADIOLOGY

## 2022-12-16 PROCEDURE — 74011250636 HC RX REV CODE- 250/636: Performed by: FAMILY MEDICINE

## 2022-12-16 PROCEDURE — 96375 TX/PRO/DX INJ NEW DRUG ADDON: CPT

## 2022-12-16 PROCEDURE — 96365 THER/PROPH/DIAG IV INF INIT: CPT

## 2022-12-16 PROCEDURE — 80053 COMPREHEN METABOLIC PANEL: CPT

## 2022-12-16 PROCEDURE — 74177 CT ABD & PELVIS W/CONTRAST: CPT

## 2022-12-16 PROCEDURE — 85025 COMPLETE CBC W/AUTO DIFF WBC: CPT

## 2022-12-16 PROCEDURE — 74011000250 HC RX REV CODE- 250: Performed by: FAMILY MEDICINE

## 2022-12-16 PROCEDURE — 71045 X-RAY EXAM CHEST 1 VIEW: CPT

## 2022-12-16 PROCEDURE — 83605 ASSAY OF LACTIC ACID: CPT

## 2022-12-16 RX ORDER — CIPROFLOXACIN 500 MG/1
500 TABLET ORAL 2 TIMES DAILY
COMMUNITY
Start: 2022-12-14 | End: 2022-12-23

## 2022-12-16 RX ORDER — METRONIDAZOLE 500 MG/100ML
500 INJECTION, SOLUTION INTRAVENOUS EVERY 8 HOURS
Status: DISCONTINUED | OUTPATIENT
Start: 2022-12-17 | End: 2022-12-17

## 2022-12-16 RX ORDER — SODIUM CHLORIDE 9 MG/ML
1000 INJECTION, SOLUTION INTRAVENOUS ONCE
Status: COMPLETED | OUTPATIENT
Start: 2022-12-16 | End: 2022-12-16

## 2022-12-16 RX ORDER — LORAZEPAM 0.5 MG/1
0.5 TABLET ORAL
COMMUNITY

## 2022-12-16 RX ORDER — METRONIDAZOLE 500 MG/1
500 TABLET ORAL 3 TIMES DAILY
COMMUNITY
Start: 2022-12-14 | End: 2022-12-23

## 2022-12-16 RX ORDER — SODIUM CHLORIDE 0.9 % (FLUSH) 0.9 %
5-40 SYRINGE (ML) INJECTION AS NEEDED
Status: DISCONTINUED | OUTPATIENT
Start: 2022-12-16 | End: 2022-12-23 | Stop reason: HOSPADM

## 2022-12-16 RX ORDER — SODIUM CHLORIDE 0.9 % (FLUSH) 0.9 %
5-40 SYRINGE (ML) INJECTION EVERY 8 HOURS
Status: DISCONTINUED | OUTPATIENT
Start: 2022-12-16 | End: 2022-12-23 | Stop reason: HOSPADM

## 2022-12-16 RX ORDER — ONDANSETRON 2 MG/ML
4 INJECTION INTRAMUSCULAR; INTRAVENOUS
Status: DISPENSED | OUTPATIENT
Start: 2022-12-16 | End: 2022-12-17

## 2022-12-16 RX ORDER — METRONIDAZOLE 500 MG/100ML
500 INJECTION, SOLUTION INTRAVENOUS ONCE
Status: COMPLETED | OUTPATIENT
Start: 2022-12-16 | End: 2022-12-16

## 2022-12-16 RX ORDER — ACETAMINOPHEN 325 MG/1
650 TABLET ORAL
Status: DISCONTINUED | OUTPATIENT
Start: 2022-12-16 | End: 2022-12-23 | Stop reason: HOSPADM

## 2022-12-16 RX ORDER — SODIUM CHLORIDE 9 MG/ML
1300 INJECTION, SOLUTION INTRAVENOUS ONCE
Status: COMPLETED | OUTPATIENT
Start: 2022-12-16 | End: 2022-12-16

## 2022-12-16 RX ORDER — SERTRALINE HYDROCHLORIDE 100 MG/1
100 TABLET, FILM COATED ORAL DAILY
COMMUNITY

## 2022-12-16 RX ORDER — ACETAMINOPHEN 650 MG/1
650 SUPPOSITORY RECTAL
Status: DISCONTINUED | OUTPATIENT
Start: 2022-12-16 | End: 2022-12-23 | Stop reason: HOSPADM

## 2022-12-16 RX ORDER — ONDANSETRON 2 MG/ML
4 INJECTION INTRAMUSCULAR; INTRAVENOUS
Status: DISCONTINUED | OUTPATIENT
Start: 2022-12-16 | End: 2022-12-23 | Stop reason: HOSPADM

## 2022-12-16 RX ORDER — ONDANSETRON 4 MG/1
4 TABLET, ORALLY DISINTEGRATING ORAL
Status: DISCONTINUED | OUTPATIENT
Start: 2022-12-16 | End: 2022-12-23 | Stop reason: HOSPADM

## 2022-12-16 RX ADMIN — METRONIDAZOLE 500 MG: 500 INJECTION, SOLUTION INTRAVENOUS at 16:58

## 2022-12-16 RX ADMIN — SODIUM CHLORIDE, PRESERVATIVE FREE 10 ML: 5 INJECTION INTRAVENOUS at 22:25

## 2022-12-16 RX ADMIN — ONDANSETRON HYDROCHLORIDE 4 MG: 2 SOLUTION INTRAMUSCULAR; INTRAVENOUS at 20:15

## 2022-12-16 RX ADMIN — IOPAMIDOL 100 ML: 755 INJECTION, SOLUTION INTRAVENOUS at 17:30

## 2022-12-16 RX ADMIN — SODIUM CHLORIDE 1000 ML: 9 INJECTION, SOLUTION INTRAVENOUS at 22:26

## 2022-12-16 RX ADMIN — ACETAMINOPHEN 650 MG: 325 TABLET ORAL at 20:56

## 2022-12-16 RX ADMIN — SODIUM CHLORIDE 1000 ML: 9 INJECTION, SOLUTION INTRAVENOUS at 16:58

## 2022-12-16 RX ADMIN — SODIUM CHLORIDE 1300 ML: 9 INJECTION, SOLUTION INTRAVENOUS at 22:25

## 2022-12-16 RX ADMIN — CEFEPIME 2 G: 2 INJECTION, POWDER, FOR SOLUTION INTRAVENOUS at 16:57

## 2022-12-16 NOTE — ED PROVIDER NOTES
Patient is a 68-year-old female history of hyperlipidemia with recent admission for flulike symptoms at [facility] here today with a positive liver biopsy. She had a CT scan showing questionable liver metastases and was seen by oncology while in the hospital.  They did a biopsy of the liver and discharged her. They called her today stating that the biopsy came back positive and that she is needed to be admitted. She had been doing okay since the discharge from the hospital although not feeling back to baseline. Over the past day has had nausea, vomiting, chills, mild right-sided abdominal discomfort, lightheadedness. Has had fevers as well       Past Medical History:   Diagnosis Date    High cholesterol     Infectious disease     Other ill-defined conditions(799.89)     fluid build up in chest       Past Surgical History:   Procedure Laterality Date    COLONOSCOPY N/A 12/9/2022    COLONOSCOPY performed by Cedric Hammond MD at [facility] ENDOSCOPY    HX BREAST BIOPSY           No family history on file.     Social History     Socioeconomic History    Marital status:      Spouse name: Not on file    Number of children: Not on file    Years of education: Not on file    Highest education level: Not on file   Occupational History    Not on file   Tobacco Use    Smoking status: Every Day     Packs/day: 0.50     Types: Cigarettes    Smokeless tobacco: Not on file   Substance and Sexual Activity    Alcohol use: Yes     Comment: about 4-6 beers per week    Drug use: Yes     Types: Marijuana     Comment: pt jeromy Russell yesterday    Sexual activity: Not on file   Other Topics Concern    Not on file   Social History Narrative    Not on file     Social Determinants of Health     Financial Resource Strain: Not on file   Food Insecurity: Not on file   Transportation Needs: Not on file   Physical Activity: Not on file   Stress: Not on file   Social Connections: Not on file   Intimate Partner Violence: Not on file   Housing Stability: Not on file         ALLERGIES: Erythromycin, Pcn [penicillins], and Sulfa (sulfonamide antibiotics)    Review of Systems   Constitutional:  Positive for chills and fever. HENT:  Negative for congestion and rhinorrhea. Eyes:  Negative for redness and visual disturbance. Respiratory:  Negative for cough and shortness of breath. Cardiovascular:  Negative for chest pain and leg swelling. Gastrointestinal:  Positive for abdominal pain, nausea and vomiting. Negative for diarrhea. Genitourinary:  Negative for dysuria, flank pain, frequency, hematuria and urgency. Musculoskeletal:  Negative for arthralgias, back pain, myalgias and neck pain. Skin:  Negative for rash and wound. Allergic/Immunologic: Negative for immunocompromised state. Neurological:  Positive for dizziness. Negative for headaches. Vitals:    12/16/22 1317   BP: 106/74   Pulse: (!) 106   Resp: 16   Temp: 97.5 °F (36.4 °C)   SpO2: 97%            Physical Exam  Vitals and nursing note reviewed. Constitutional:       General: She is not in acute distress. Appearance: She is well-developed. She is ill-appearing. She is not diaphoretic. Comments: Actively vomiting   HENT:      Head: Normocephalic. Mouth/Throat:      Pharynx: No oropharyngeal exudate. Eyes:      General:         Right eye: No discharge. Left eye: No discharge. Pupils: Pupils are equal, round, and reactive to light. Cardiovascular:      Rate and Rhythm: Regular rhythm. Tachycardia present. Heart sounds: Normal heart sounds. No murmur heard. No friction rub. No gallop. Pulmonary:      Effort: Pulmonary effort is normal. No respiratory distress. Breath sounds: Normal breath sounds. No stridor. No wheezing or rales. Abdominal:      General: Bowel sounds are normal. There is no distension. Palpations: Abdomen is soft. Tenderness: There is abdominal tenderness (mild RUQ).  There is no guarding or rebound. Musculoskeletal:         General: No deformity. Normal range of motion. Cervical back: Normal range of motion and neck supple. Skin:     General: Skin is warm and dry. Capillary Refill: Capillary refill takes less than 2 seconds. Findings: No rash. Neurological:      Mental Status: She is alert and oriented to person, place, and time. Psychiatric:         Behavior: Behavior normal.        MDM     Amount and/or Complexity of Data Reviewed  Clinical lab tests: reviewed  Tests in the radiology section of CPT®: reviewed  Decide to obtain previous medical records or to obtain history from someone other than the patient: yes           Procedures  Upon initial presentation I have ordered cefepime, Flagyl, cultures, lactic acid and IV fluids   . Work-up:  Leukocytosis but downtrending  Thrombocytosis  No anemia  LFTs okay  Renal function okay  Blood culture sent, lactic acid pending  CT showing increasing size and liver masses consistent with abscess, smaller lesions less apparent. CT also showing acute diverticulitis with probable small associated abscess      Perfect Serve Consult for Admission  7:02 PM    ED Room Number: ER20/20  Patient Name and age:  Jodee Mcneill 63 y.o.  female  Working Diagnosis:   1. Liver abscess        COVID-19 Suspicion:  no  Sepsis present:  no  Reassessment needed: no  Code Status:  Full Code  Readmission: no  Isolation Requirements:  no  Recommended Level of Care:  med/surg  Department:[facility] Adult ED - 21   Other: Patient is a 42-year-old female presenting today after having a positive liver biopsy (gram-negative anaerobics). Here has a leukocytosis but it is downtrending from her recent hospitalization at [facility].  IV antibiotics started. Labs and culture sent. Lactic acid pending. IR consult placed, awaiting callback.      Damian Hamilton DO

## 2022-12-16 NOTE — ED TRIAGE NOTES
Pt arrives from home for liver abscesses. [id number] and was discharged before liver biopsy was resulted. Results came back as liver abscesses and was told to come back to ED.  Pt c/o nausea, fever, chills, lethargy

## 2022-12-17 LAB
ALBUMIN SERPL-MCNC: 1.8 G/DL (ref 3.5–5)
ALBUMIN/GLOB SERPL: 0.4 {RATIO} (ref 1.1–2.2)
ALP SERPL-CCNC: 106 U/L (ref 45–117)
ALT SERPL-CCNC: 21 U/L (ref 12–78)
ANION GAP SERPL CALC-SCNC: 5 MMOL/L (ref 5–15)
APPEARANCE UR: CLEAR
AST SERPL-CCNC: 17 U/L (ref 15–37)
BASOPHILS # BLD: 0 K/UL (ref 0–0.1)
BASOPHILS NFR BLD: 0 % (ref 0–1)
BILIRUB SERPL-MCNC: 0.6 MG/DL (ref 0.2–1)
BILIRUB UR QL: NEGATIVE
BUN SERPL-MCNC: 8 MG/DL (ref 6–20)
BUN/CREAT SERPL: 16 (ref 12–20)
CALCIUM SERPL-MCNC: 9 MG/DL (ref 8.5–10.1)
CHLORIDE SERPL-SCNC: 101 MMOL/L (ref 97–108)
CO2 SERPL-SCNC: 30 MMOL/L (ref 21–32)
COLOR UR: ABNORMAL
CREAT SERPL-MCNC: 0.51 MG/DL (ref 0.55–1.02)
DIFFERENTIAL METHOD BLD: ABNORMAL
EOSINOPHIL # BLD: 0 K/UL (ref 0–0.4)
EOSINOPHIL NFR BLD: 0 % (ref 0–7)
ERYTHROCYTE [DISTWIDTH] IN BLOOD BY AUTOMATED COUNT: 14 % (ref 11.5–14.5)
GLOBULIN SER CALC-MCNC: 4.9 G/DL (ref 2–4)
GLUCOSE SERPL-MCNC: 96 MG/DL (ref 65–100)
GLUCOSE UR STRIP.AUTO-MCNC: NEGATIVE MG/DL
HCT VFR BLD AUTO: 31.5 % (ref 35–47)
HGB BLD-MCNC: 9.8 G/DL (ref 11.5–16)
HGB UR QL STRIP: NEGATIVE
IMM GRANULOCYTES # BLD AUTO: 0.2 K/UL (ref 0–0.04)
IMM GRANULOCYTES NFR BLD AUTO: 1 % (ref 0–0.5)
KETONES UR QL STRIP.AUTO: ABNORMAL MG/DL
LEUKOCYTE ESTERASE UR QL STRIP.AUTO: NEGATIVE
LYMPHOCYTES # BLD: 1.7 K/UL (ref 0.8–3.5)
LYMPHOCYTES NFR BLD: 11 % (ref 12–49)
MCH RBC QN AUTO: 30.1 PG (ref 26–34)
MCHC RBC AUTO-ENTMCNC: 31.1 G/DL (ref 30–36.5)
MCV RBC AUTO: 96.6 FL (ref 80–99)
MONOCYTES # BLD: 1.1 K/UL (ref 0–1)
MONOCYTES NFR BLD: 7 % (ref 5–13)
NEUTS SEG # BLD: 12.6 K/UL (ref 1.8–8)
NEUTS SEG NFR BLD: 81 % (ref 32–75)
NITRITE UR QL STRIP.AUTO: NEGATIVE
NRBC # BLD: 0 K/UL (ref 0–0.01)
NRBC BLD-RTO: 0 PER 100 WBC
PH UR STRIP: 6 [PH] (ref 5–8)
PLATELET # BLD AUTO: 604 K/UL (ref 150–400)
PMV BLD AUTO: 9.8 FL (ref 8.9–12.9)
POTASSIUM SERPL-SCNC: 3 MMOL/L (ref 3.5–5.1)
PROT SERPL-MCNC: 6.7 G/DL (ref 6.4–8.2)
PROT UR STRIP-MCNC: NEGATIVE MG/DL
RBC # BLD AUTO: 3.26 M/UL (ref 3.8–5.2)
RBC MORPH BLD: ABNORMAL
SODIUM SERPL-SCNC: 136 MMOL/L (ref 136–145)
SP GR UR REFRACTOMETRY: 1.02 (ref 1–1.03)
UROBILINOGEN UR QL STRIP.AUTO: 0.2 EU/DL (ref 0.2–1)
WBC # BLD AUTO: 15.6 K/UL (ref 3.6–11)

## 2022-12-17 PROCEDURE — 81003 URINALYSIS AUTO W/O SCOPE: CPT

## 2022-12-17 PROCEDURE — 74011250636 HC RX REV CODE- 250/636: Performed by: FAMILY MEDICINE

## 2022-12-17 PROCEDURE — 74011000250 HC RX REV CODE- 250: Performed by: FAMILY MEDICINE

## 2022-12-17 PROCEDURE — 74011000258 HC RX REV CODE- 258: Performed by: FAMILY MEDICINE

## 2022-12-17 PROCEDURE — 85025 COMPLETE CBC W/AUTO DIFF WBC: CPT

## 2022-12-17 PROCEDURE — 93005 ELECTROCARDIOGRAM TRACING: CPT

## 2022-12-17 PROCEDURE — 36415 COLL VENOUS BLD VENIPUNCTURE: CPT

## 2022-12-17 PROCEDURE — 65270000029 HC RM PRIVATE

## 2022-12-17 PROCEDURE — 74011250636 HC RX REV CODE- 250/636: Performed by: PHYSICIAN ASSISTANT

## 2022-12-17 PROCEDURE — 74011250637 HC RX REV CODE- 250/637: Performed by: FAMILY MEDICINE

## 2022-12-17 PROCEDURE — 87040 BLOOD CULTURE FOR BACTERIA: CPT

## 2022-12-17 PROCEDURE — 80053 COMPREHEN METABOLIC PANEL: CPT

## 2022-12-17 RX ORDER — LORAZEPAM 0.5 MG/1
0.5 TABLET ORAL
Status: DISCONTINUED | OUTPATIENT
Start: 2022-12-17 | End: 2022-12-22

## 2022-12-17 RX ORDER — HYDROCHLOROTHIAZIDE 25 MG/1
25 TABLET ORAL DAILY
Status: DISCONTINUED | OUTPATIENT
Start: 2022-12-17 | End: 2022-12-23 | Stop reason: HOSPADM

## 2022-12-17 RX ORDER — POTASSIUM CHLORIDE 7.45 MG/ML
10 INJECTION INTRAVENOUS
Status: DISCONTINUED | OUTPATIENT
Start: 2022-12-17 | End: 2022-12-17

## 2022-12-17 RX ORDER — SERTRALINE HYDROCHLORIDE 50 MG/1
100 TABLET, FILM COATED ORAL DAILY
Status: DISCONTINUED | OUTPATIENT
Start: 2022-12-17 | End: 2022-12-23 | Stop reason: HOSPADM

## 2022-12-17 RX ORDER — NAPROXEN 250 MG/1
500 TABLET ORAL 2 TIMES DAILY WITH MEALS
Status: DISCONTINUED | OUTPATIENT
Start: 2022-12-17 | End: 2022-12-23 | Stop reason: HOSPADM

## 2022-12-17 RX ORDER — LANOLIN ALCOHOL/MO/W.PET/CERES
3 CREAM (GRAM) TOPICAL
Status: DISCONTINUED | OUTPATIENT
Start: 2022-12-17 | End: 2022-12-23 | Stop reason: HOSPADM

## 2022-12-17 RX ORDER — FOLIC ACID 1 MG/1
1 TABLET ORAL DAILY
Status: DISCONTINUED | OUTPATIENT
Start: 2022-12-17 | End: 2022-12-23 | Stop reason: HOSPADM

## 2022-12-17 RX ORDER — METRONIDAZOLE 500 MG/100ML
500 INJECTION, SOLUTION INTRAVENOUS EVERY 12 HOURS
Status: DISCONTINUED | OUTPATIENT
Start: 2022-12-17 | End: 2022-12-18 | Stop reason: ALTCHOICE

## 2022-12-17 RX ADMIN — HYDROCHLOROTHIAZIDE 25 MG: 25 TABLET ORAL at 12:00

## 2022-12-17 RX ADMIN — METRONIDAZOLE 500 MG: 500 INJECTION, SOLUTION INTRAVENOUS at 01:07

## 2022-12-17 RX ADMIN — CEFEPIME 2 G: 2 INJECTION, POWDER, FOR SOLUTION INTRAVENOUS at 19:07

## 2022-12-17 RX ADMIN — SERTRALINE 100 MG: 50 TABLET, FILM COATED ORAL at 12:00

## 2022-12-17 RX ADMIN — POTASSIUM CHLORIDE 10 MEQ: 7.46 INJECTION, SOLUTION INTRAVENOUS at 12:02

## 2022-12-17 RX ADMIN — CEFEPIME 2 G: 2 INJECTION, POWDER, FOR SOLUTION INTRAVENOUS at 04:46

## 2022-12-17 RX ADMIN — ACETAMINOPHEN 650 MG: 325 TABLET ORAL at 07:18

## 2022-12-17 RX ADMIN — ONDANSETRON 4 MG: 4 TABLET, ORALLY DISINTEGRATING ORAL at 18:59

## 2022-12-17 RX ADMIN — SODIUM CHLORIDE, PRESERVATIVE FREE 10 ML: 5 INJECTION INTRAVENOUS at 21:52

## 2022-12-17 RX ADMIN — METRONIDAZOLE 500 MG: 500 INJECTION, SOLUTION INTRAVENOUS at 21:51

## 2022-12-17 RX ADMIN — FOLIC ACID 1 MG: 1 TABLET ORAL at 12:00

## 2022-12-17 RX ADMIN — SODIUM CHLORIDE, PRESERVATIVE FREE 10 ML: 5 INJECTION INTRAVENOUS at 05:22

## 2022-12-17 RX ADMIN — ACETAMINOPHEN 650 MG: 325 TABLET ORAL at 13:10

## 2022-12-17 RX ADMIN — ACETAMINOPHEN 650 MG: 325 TABLET ORAL at 19:04

## 2022-12-17 NOTE — PROGRESS NOTES
Day #1 of Cefepime  Indication:  Intra-abdominal infection  Current regimen:  1g every 8 hours    Recent Labs     22  1401   WBC 19.8*   CREA 0.66   BUN 9     Est CrCl: 84.2 ml/mi  Temp (24hrs), Av.6 °F (37.6 °C), Min:97.5 °F (36.4 °C), Max:100.5 °F (38.1 °C)    Plan: Change to 2 g every 12 hours extended infusion per protocol for patients with intra-abdominal infections and crcl >60 mL/min.

## 2022-12-17 NOTE — PROGRESS NOTES
Bedside shift change report given to CHRISTOPHER Bledsoe (oncoming nurse) by Chalo Luo RN (offgoing nurse). Report included the following information SBAR, Kardex, Intake/Output, and MAR.

## 2022-12-17 NOTE — PROGRESS NOTES
Admission Medication Reconciliation:    Information obtained from:  Patient, visitor  RxQuery data available¹:  YES    Comments/Recommendations: Updated PTA meds/reviewed patient's allergies. 1)  Patient reports that she has not been taking her maintenance medications since she was discharged from the hospital a week ago. She has been taking the antibiotics and prn medications since they were prescribed. 2)  Medication changes (since last review): Added  - Ciprofloxacin 500mg 1tab po twice daily x 21days  - Metronidazole 500mg 1tab po three times daily x 21days  - Sertraline 100mg 1tab po daily  - Lorazepam 0.5mg daily as needed    Removed  - Thiamine 100mg    3) Patient has several antibiotic allergies. She reports that she has tolerated Z-Ankur, Keflex, and the antibiotics she received during previous admission (levofloxacin, metronidazole, and ceftriaxone). ¹RxQuery pharmacy benefit data reflects medications filled and processed through the patient's insurance, however   this data does NOT capture whether the medication was picked up or is currently being taken by the patient. Allergies:  Erythromycin, Pcn [penicillins], and Sulfa (sulfonamide antibiotics)    Significant PMH/Disease States:   Past Medical History:   Diagnosis Date    High cholesterol     Infectious disease     Other ill-defined conditions(799.89)     fluid build up in chest     Chief Complaint for this Admission:    Chief Complaint   Patient presents with    Referral / Consult     Prior to Admission Medications:   Prior to Admission Medications   Prescriptions Last Dose Informant Taking? LORazepam (ATIVAN) 0.5 mg tablet   Yes   Sig: Take 0.5 mg by mouth daily as needed for Anxiety. acetaminophen (TYLENOL) 325 mg tablet   Yes   Sig: Take 2 Tablets by mouth every six (6) hours as needed for Pain or Fever for up to 30 days. ciprofloxacin HCl (CIPRO) 500 mg tablet 12/16/2022  Yes   Sig: Take 500 mg by mouth two (2) times a day. folic acid (FOLVITE) 1 mg tablet 12/16/2022  Yes   Sig: Take 1 Tablet by mouth daily for 30 days. hydrochlorothiazide (HYDRODIURIL) 25 mg tablet 12/9/2022  Yes   Sig: Take 25 mg by mouth daily. melatonin 3 mg tablet   Yes   Sig: Take 1 Tablet by mouth nightly as needed for Insomnia. metroNIDAZOLE (FLAGYL) 500 mg tablet 12/16/2022  Yes   Sig: Take 500 mg by mouth three (3) times daily. naproxen (Naprosyn) 500 mg tablet   Yes   Sig: Take 1 Tablet by mouth two (2) times daily (with meals) for 10 days. ondansetron hcl (Zofran) 4 mg tablet   Yes   Sig: Take 1 Tablet by mouth every eight (8) hours as needed for Nausea. sertraline (ZOLOFT) 100 mg tablet 12/9/2022  Yes   Sig: Take 100 mg by mouth daily. Facility-Administered Medications: None     Please contact the main inpatient pharmacy with any questions or concerns at [phone] and we will direct you to the clinical pharmacist covering this patient's care while in-house.    Chelsy Ortiz, JYOTID

## 2022-12-17 NOTE — PROGRESS NOTES
[facility]                                                                                          Hospitalist Progress Note  Christy Richardson [place]  Answering service: [phone] OR [phone] from in house phone        Date of Service:  2022  NAME:  Kiley Portillo  :  1960  MRN:  [id number]      Admission Summary:   Kiley Portillo is a 63 y.o. female with a pmhx dyslipidemia, and ETOH dependence who presents with liver abscess. She presented initially to the ED about 2 weeks ago with fevers, diarrhea, vomiting, and weight loss. Work-up revealed stable liver masses, and sigmoid diverticulitis. She underwent colonoscopy which was unremarkable, and had a liver biopsy performed. In the ED pt was febrile to 100.5, CT abd/pelvis demonstrated increase in size of several of the liver masses have increased in size. Started on cefepime and flagyl, consult placed to IR       Interval history / Subjective:      Saw the patient on rounds, She endorses some left sided abdominal pain that does not radiate. Imaging concerning for increase in size of liver abscesses, paged on call interventional radiologist regarding drain placement, they will do the procedure Monday. In the meantime will continue with cefepime and flagyl    Spoke with the patient regarding the plan, addressed all questions and concerns. Assessment & Plan:     Liver abscess   Leukocytosis   - CT abd/pelvis: Multiple thick walled low-attenuation hepatic masses with the largest measuring 10.4x8.2cm (previously 9.0 x 6.6 cm on ) Focal wall thickening in the sigmoid colon, with probable 2.2 cm intramural abscess  intramural abscess  - BCX: in process,    - ABX: cefepime, and flagyl for now  - Consult placed to ID, will follow up their reccomendations  - Lactic acid WNL  - IR consulted  - : Paged on call IR, Dr. Vivienne Cuello, regarding liver abscesses and possible need for urgent drainage.  IR to place drain(s) Monday, requested we obtain coags     Depression and anxiety  - Continue home Zoloft  - PRN ativan     Hypertension  - Continue home hydrochlorothiazide    Past history of EtOH dependence  - Has not had any alcohol for several weeks since diagnosis  - Continue home folic acid    Code status: Full  Prophylaxis: SCDs  Care Plan discussed with:   Anticipated Disposition: TBD     Hospital Problems  Date Reviewed: 12/8/2022            Codes Class Noted POA    Liver abscess ICD-10-CM: K75.0  ICD-9-CM: 572.0  12/16/2022 Unknown             Review of Systems:   A comprehensive review of systems was negative except for that written in the HPI. Vital Signs:    Last 24hrs VS reviewed since prior progress note. Most recent are:  Visit Vitals  BP 99/66 (BP 1 Location: Left upper arm, BP Patient Position: At rest;Semi fowlers)   Pulse 94   Temp 98.6 °F (37 °C)   Resp 16   Ht 5' 4\" (1.626 m)   Wt 73.9 kg (163 lb)   SpO2 94%   BMI 27.98 kg/m²         Intake/Output Summary (Last 24 hours) at 12/17/2022 0842  Last data filed at 12/16/2022 1853  Gross per 24 hour   Intake 1100 ml   Output --   Net 1100 ml        Physical Examination:     I had a face to face encounter with this patient and independently examined them on 12/17/2022 as outlined below:          Constitutional:  No acute distress, cooperative, pleasant    ENT:  Oral mucosa moist, oropharynx benign. Resp:  CTA bilaterally. No wheezing/rhonchi/rales. No accessory muscle use. CV:  Regular rhythm, normal rate, no murmurs, gallops, rubs    GI:  Tender to palpation in LUQ, LLQ, no rebound or guarding, soft, non distended, hypoactive bowel sounds,     Musculoskeletal:  No edema, warm, 2+ pulses throughout    Neurologic:  Moves all extremities.   AAOx3, CN II-XII intact, sensation intact            Data Review:    Review and/or order of clinical lab test  Review and/or order of tests in the radiology section of CPT  Review and/or order of tests in the medicine section of [facility]      Labs:     Recent Labs     12/16/22  1401   WBC 19.8*   HGB 12.3   HCT 37.7   *     Recent Labs     12/17/22  0425 12/16/22  1401    132*   K 3.0* 4.8    95*   CO2 30 32   BUN 8 9   CREA 0.51* 0.66   GLU 96 116*   CA 9.0 9.3     Recent Labs     12/17/22  0425 12/16/22  1401   ALT 21 33    145*   TBILI 0.6 1.0   TP 6.7 8.6*   ALB 1.8* 2.2*   GLOB 4.9* 6.4*     No results for input(s): INR, PTP, APTT, INREXT in the last 72 hours. No results for input(s): FE, TIBC, PSAT, FERR in the last 72 hours. No results found for: FOL, RBCF   No results for input(s): PH, PCO2, PO2 in the last 72 hours. No results for input(s): CPK, CKNDX, TROIQ in the last 72 hours.     No lab exists for component: CPKMB  No results found for: CHOL, CHOLX, CHLST, CHOLV, HDL, HDLP, LDL, LDLC, DLDLP, TGLX, TRIGL, TRIGP, CHHD, CHHDX  No results found for: [facility]  Lab Results   Component Value Date/Time    Color YELLOW/STRAW 12/17/2022 01:11 AM    Appearance CLEAR 12/17/2022 01:11 AM    Specific gravity 1.022 12/17/2022 01:11 AM    pH (UA) 6.0 12/17/2022 01:11 AM    Protein Negative 12/17/2022 01:11 AM    Glucose Negative 12/17/2022 01:11 AM    Ketone TRACE (A) 12/17/2022 01:11 AM    Bilirubin Negative 12/17/2022 01:11 AM    Urobilinogen 0.2 12/17/2022 01:11 AM    Nitrites Negative 12/17/2022 01:11 AM    Leukocyte Esterase Negative 12/17/2022 01:11 AM    Epithelial cells FEW 12/07/2022 01:14 AM    Bacteria 1+ (A) 12/07/2022 01:14 AM    WBC 0-4 12/07/2022 01:14 AM    RBC 5-10 12/07/2022 01:14 AM         Medications Reviewed:     Current Facility-Administered Medications   Medication Dose Route Frequency    folic acid (FOLVITE) tablet 1 mg  1 mg Oral DAILY    melatonin tablet 3 mg  3 mg Oral QHS PRN    naproxen (NAPROSYN) tablet 500 mg  500 mg Oral BID WITH MEALS    hydroCHLOROthiazide (HYDRODIURIL) tablet 25 mg  25 mg Oral DAILY    LORazepam (ATIVAN) tablet 0.5 mg  0.5 mg Oral DAILY PRN sertraline (ZOLOFT) tablet 100 mg  100 mg Oral DAILY    potassium chloride 10 mEq in 100 ml IVPB  10 mEq IntraVENous Q1H    sodium chloride (NS) flush 5-40 mL  5-40 mL IntraVENous Q8H    sodium chloride (NS) flush 5-40 mL  5-40 mL IntraVENous PRN    acetaminophen (TYLENOL) tablet 650 mg  650 mg Oral Q6H PRN    Or    acetaminophen (TYLENOL) suppository 650 mg  650 mg Rectal Q6H PRN    ondansetron (ZOFRAN ODT) tablet 4 mg  4 mg Oral Q8H PRN    Or    ondansetron (ZOFRAN) injection 4 mg  4 mg IntraVENous Q6H PRN    metroNIDAZOLE (FLAGYL) IVPB premix 500 mg  500 mg IntraVENous Q8H    cefepime (MAXIPIME) 2 g in 0.9% sodium chloride (MBP/ADV) 100 mL MBP  2 g IntraVENous Q12H     ______________________________________________________________________  EXPECTED LENGTH OF STAY: - - -  ACTUAL LENGTH OF STAY:          1                 Mable Lawn Milligram, PA-C

## 2022-12-17 NOTE — ED NOTES
Has a final transfer review been performed?  Yes    Reason for Admission: liver abcess/intermitent fever  Patient comes from: home  Mental Status: Alert and oriented  ADL: self care  Ambulation: no difficulty  Pertinent Info/Safety Concerns: WBC 19  COVID Status: Not Tested  MEWS Score: 1  Sinus Tachy  Vitals:    12/16/22 1708 12/16/22 2030 12/16/22 2045 12/16/22 2100   BP: 99/63 97/76 98/75 105/78   Pulse: 100 (!) 102 (!) 103 100   Resp: 17 29 27 18   Temp:  (!) 100.5 °F (38.1 °C) (!) 100.5 °F (38.1 °C) 100 °F (37.8 °C)   SpO2: 98% 91% 92% 94%     Lines:   Peripheral IV 12/16/22 Anterior;Proximal;Right Antecubital (Active)      Transport mode:ED stretcher    Lori Weaver  being transferred to (unit) for routine progression of care     \"Notification of etransfer note given to (Name) Skylar Quarles (Title) RN

## 2022-12-17 NOTE — PROGRESS NOTES
Clinical Pharmacy Note: Metronidazole Dosing    Please note that the metronidazole dose for Aayush Vaughan has been changed to 500 mg IV q12h per [facility]-approved protocol. Please contact the pharmacy with any questions.     Leigh Ann Garcia, JYOTID

## 2022-12-17 NOTE — H&P
[address] [facility] Adult  Hospitalist Group  History and Physical    Date of Service:  12/16/2022  Primary Care Provider: Miguel A Cook MD  Source of information: The patient and Chart review    Chief Complaint: Referral / Consult      History of Presenting Illness:   Laura Schmidt is a 63 y.o. female with a pmhx dyslipidemia, and ETOH dependence who presents with liver abscess. She presented initially to the ED about 2 weeks ago with fevers, diarrhea, vomiting, and weight loss. Work-up revealed stable liver masses, and sigmoid diverticulitis. She underwent colonoscopy which was unremarkable, and had a liver biopsy performed    ED, she was febrile to 100.5, and tachycardic to 120. Labs were significant for WBC 19.8, and albumin 2.2. CT abdomen/pelvis shows multiple liver masses, previously seen with the 3 largest having increased in size. There was also focal wall thickening of the sigmoid colon compatible with acute diverticulitis, and probable small associated intramural abscesses. ED, she was treated with 1 L normal saline, cefepime and Flagyl, and tylenol. REVIEW OF SYSTEMS:  A comprehensive review of systems was negative except for that written in the History of Present Illness. Past Medical History:   Diagnosis Date    High cholesterol     Infectious disease     Other ill-defined conditions(799.89)     fluid build up in chest      Past Surgical History:   Procedure Laterality Date    COLONOSCOPY N/A 12/9/2022    COLONOSCOPY performed by Simran Foreman MD at [facility] ENDOSCOPY    HX BREAST BIOPSY       Prior to Admission medications    Medication Sig Start Date End Date Taking? Authorizing Provider   ciprofloxacin HCl (CIPRO) 500 mg tablet Take 500 mg by mouth two (2) times a day. 12/14/22 1/4/23 Yes Provider, Historical   metroNIDAZOLE (FLAGYL) 500 mg tablet Take 500 mg by mouth three (3) times daily.  12/14/22 1/4/23 Yes Provider, Historical   LORazepam (ATIVAN) 0.5 mg tablet Take 0.5 mg by mouth daily as needed for Anxiety. Yes Provider, Historical   sertraline (ZOLOFT) 100 mg tablet Take 100 mg by mouth daily. Yes Provider, Historical   acetaminophen (TYLENOL) 325 mg tablet Take 2 Tablets by mouth every six (6) hours as needed for Pain or Fever for up to 30 days. 12/9/22 1/8/23 Yes Kiki Anderson APRN   folic acid (FOLVITE) 1 mg tablet Take 1 Tablet by mouth daily for 30 days. 12/10/22 1/9/23 Yes Armond Anderson APRN   melatonin 3 mg tablet Take 1 Tablet by mouth nightly as needed for Insomnia. 12/9/22  Yes Armond Anderson APRN   ondansetron hcl (Zofran) 4 mg tablet Take 1 Tablet by mouth every eight (8) hours as needed for Nausea. 12/7/22  Yes Rosalina King MD   naproxen (Naprosyn) 500 mg tablet Take 1 Tablet by mouth two (2) times daily (with meals) for 10 days. 12/7/22 12/17/22 Yes Rosalina King MD   hydrochlorothiazide (HYDRODIURIL) 25 mg tablet Take 25 mg by mouth daily. Yes Other, MD Sayra     Allergies   Allergen Reactions    Erythromycin Other (comments)     \"Chest Pain\"    Pcn [Penicillins] Rash    Sulfa (Sulfonamide Antibiotics) Rash      No family history on file. Social History:  reports that she has been smoking. She has been smoking an average of .5 packs per day. She does not have any smokeless tobacco history on file. She reports current alcohol use. She reports current drug use. Drug: Marijuana. Family and social history were personally reviewed, all pertinent and relevant details are outlined as above.     Objective:   Visit Vitals  BP 99/63   Pulse 100   Temp 97.5 °F (36.4 °C)   Resp 17   SpO2 98%      O2 Device: None (Room air)    PHYSICAL EXAM:   General: Alert x oriented x 3, awake, no acute distress, resting in bed, pleasant female, appears to be stated age  HEENT: PEERL, EOMI, moist mucus membranes  Neck: Supple, no JVD, no meningeal signs  Chest: Clear to auscultation bilaterally   CVS: RRR, S1 S2 heard, no murmurs/rubs/gallops  Abd: Soft, non-tender, non-distended, no rebound or guarding, +bowel sounds   Ext: No clubbing, no cyanosis, no edema  Neuro/Psych: Pleasant mood and affect, CN 2-12 grossly intact, sensory grossly within normal limit, Strength 5/5 in all extremities  Cap refill: Brisk, less than 3 seconds  Pulses: 2+radial pulses  Skin: Warm, dry, without rashes or lesions    Data Review: All diagnostic labs and studies have been reviewed. Abnormal Labs Reviewed   CBC WITH AUTOMATED DIFF - Abnormal; Notable for the following components:       Result Value    WBC 19.8 (*)     PLATELET 949 (*)     NEUTROPHILS 86 (*)     LYMPHOCYTES 9 (*)     MONOCYTES 4 (*)     IMMATURE GRANULOCYTES 1 (*)     ABS. NEUTROPHILS 17.0 (*)     ABS. IMM. GRANS. 0.2 (*)     All other components within normal limits   METABOLIC PANEL, COMPREHENSIVE - Abnormal; Notable for the following components:    Sodium 132 (*)     Chloride 95 (*)     Glucose 116 (*)     AST (SGOT) 45 (*)     Alk. phosphatase 145 (*)     Protein, total 8.6 (*)     Albumin 2.2 (*)     Globulin 6.4 (*)     A-G Ratio 0.3 (*)     All other components within normal limits       All Micro Results       Procedure Component Value Units Date/Time    CULTURE, BLOOD, PAIRED [[id number]] Collected: 12/16/22 1652    Order Status: Sent Specimen: Blood Updated: 12/16/22 1726            IMAGING:   CT ABD PELV W CONT   Final Result   1. The 3 largest liver masses seen on prior CT are increased in size, but most   of the smaller lesions are less conspicuous. Findings are compatible with liver   abscesses. 2. Focal wall thickening in the sigmoid colon,, compatible with acute   diverticulitis, with probable small associated intramural abscess.            ECG/ECHO:    Results for orders placed or performed during the hospital encounter of 12/07/22   EKG, 12 LEAD, INITIAL   Result Value Ref Range    Ventricular Rate 61 BPM    Atrial Rate 61 BPM    P-R Interval 146 ms    QRS Duration 104 ms    Q-T Interval 470 ms QTC Calculation (Bezet) 473 ms    Calculated P Axis 24 degrees    Calculated R Axis -7 degrees    Calculated T Axis 35 degrees    Diagnosis       Normal sinus rhythm  Incomplete right bundle branch block  Minimal voltage criteria for LVH, may be normal variant  Nonspecific T wave abnormality  When compared with ECG of 09-MAY-2013 11:26,  No significant change was found  Confirmed by Violet Ghotra ([id number]) on 12/7/2022 10:23:32 PM          Assessment:   Given the patient's current clinical presentation, there is a high level of concern for decompensation if discharged from the emergency department. Complex decision making was performed, which includes reviewing the patient's available past medical records, laboratory results, and imaging studies. Active Problems:    Liver abscess (12/16/2022)      Plan:     #Sepsis, POA  #hepatic abscesses  Sepsis Re-Assessment Documentation:     Date: 12/16/2022   Time: 8:00 PM    The sepsis reassessment was performed at 2000 time     -Febrile to 100.5, tachycardia 120, WBC 19.8, multiple liver abscesses, and sigmoid diverticular abscesses  -follow Ua/Ucx, and blood cx  -continue cefepime, and flagyl  -30cc/kg fluid resuscitation  -trend lactate  -IR consulted for possible I&D of liver abscesses    #depression and anxiety  -Continue home Zoloft, and as needed Ativan    #Hypertension  - Continue home hydrochlorothiazide    #Past history of EtOH dependence  - Has not had any alcohol for several weeks since diagnosis  - Continue home folic acid    DIET: DIET NPO   ISOLATION PRECAUTIONS: There are currently no Active Isolations  CODE STATUS: Full Code   DVT PROPHYLAXIS: SCDs  FUNCTIONAL STATUS PRIOR TO HOSPITALIZATION: Fully active and ambulatory; able to carry on all self-care without restriction.   Ambulatory status/function: By self   EARLY MOBILITY ASSESSMENT: Recommend routine ambulation while hospitalized with the assistance of nursing staff  ANTICIPATED DISCHARGE: Greater than 48 hours. ANTICIPATED DISPOSITION: Home  EMERGENCY CONTACT/SURROGATE DECISION MAKER: Sarah Rodriguez (child)    Signed By: Cristian Jacobs MD     December 16, 2022         Please note that this dictation may have been completed with Dragon, the computer voice recognition software. Quite often unanticipated grammatical, syntax, homophones, and other interpretive errors are inadvertently transcribed by the computer software. Please disregard these errors. Please excuse any errors that have escaped final proofreading.

## 2022-12-18 ENCOUNTER — APPOINTMENT (OUTPATIENT)
Dept: CT IMAGING | Age: 62
End: 2022-12-18
Attending: PHYSICIAN ASSISTANT
Payer: COMMERCIAL

## 2022-12-18 LAB
ALBUMIN SERPL-MCNC: 1.9 G/DL (ref 3.5–5)
ALBUMIN/GLOB SERPL: 0.4 {RATIO} (ref 1.1–2.2)
ALP SERPL-CCNC: 109 U/L (ref 45–117)
ALT SERPL-CCNC: 22 U/L (ref 12–78)
ANION GAP SERPL CALC-SCNC: 6 MMOL/L (ref 5–15)
AST SERPL-CCNC: 14 U/L (ref 15–37)
ATRIAL RATE: 82 BPM
BASOPHILS # BLD: 0 K/UL (ref 0–0.1)
BASOPHILS NFR BLD: 0 % (ref 0–1)
BILIRUB SERPL-MCNC: 0.6 MG/DL (ref 0.2–1)
BUN SERPL-MCNC: 6 MG/DL (ref 6–20)
BUN/CREAT SERPL: 11 (ref 12–20)
CALCIUM SERPL-MCNC: 9.2 MG/DL (ref 8.5–10.1)
CALCULATED P AXIS, ECG09: 28 DEGREES
CALCULATED R AXIS, ECG10: -10 DEGREES
CALCULATED T AXIS, ECG11: 21 DEGREES
CHLORIDE SERPL-SCNC: 95 MMOL/L (ref 97–108)
CO2 SERPL-SCNC: 32 MMOL/L (ref 21–32)
CREAT SERPL-MCNC: 0.56 MG/DL (ref 0.55–1.02)
DIAGNOSIS, 93000: NORMAL
DIFFERENTIAL METHOD BLD: ABNORMAL
EOSINOPHIL # BLD: 0 K/UL (ref 0–0.4)
EOSINOPHIL NFR BLD: 0 % (ref 0–7)
ERYTHROCYTE [DISTWIDTH] IN BLOOD BY AUTOMATED COUNT: 13.2 % (ref 11.5–14.5)
GLOBULIN SER CALC-MCNC: 5.4 G/DL (ref 2–4)
GLUCOSE SERPL-MCNC: 107 MG/DL (ref 65–100)
HCT VFR BLD AUTO: 31.2 % (ref 35–47)
HGB BLD-MCNC: 10.4 G/DL (ref 11.5–16)
IMM GRANULOCYTES # BLD AUTO: 0 K/UL (ref 0–0.04)
IMM GRANULOCYTES NFR BLD AUTO: 0 % (ref 0–0.5)
INR PPP: 1.3 (ref 0.9–1.1)
LYMPHOCYTES # BLD: 2 K/UL (ref 0.8–3.5)
LYMPHOCYTES NFR BLD: 11 % (ref 12–49)
MCH RBC QN AUTO: 30.5 PG (ref 26–34)
MCHC RBC AUTO-ENTMCNC: 33.3 G/DL (ref 30–36.5)
MCV RBC AUTO: 91.5 FL (ref 80–99)
MONOCYTES # BLD: 1.1 K/UL (ref 0–1)
MONOCYTES NFR BLD: 6 % (ref 5–13)
NEUTS SEG # BLD: 14.7 K/UL (ref 1.8–8)
NEUTS SEG NFR BLD: 83 % (ref 32–75)
NRBC # BLD: 0 K/UL (ref 0–0.01)
NRBC BLD-RTO: 0 PER 100 WBC
P-R INTERVAL, ECG05: 148 MS
PLATELET # BLD AUTO: 621 K/UL (ref 150–400)
PLATELET COMMENTS,PCOM: ABNORMAL
PMV BLD AUTO: 9.3 FL (ref 8.9–12.9)
POTASSIUM SERPL-SCNC: 3 MMOL/L (ref 3.5–5.1)
PROT SERPL-MCNC: 7.3 G/DL (ref 6.4–8.2)
PROTHROMBIN TIME: 12.9 SEC (ref 9–11.1)
Q-T INTERVAL, ECG07: 390 MS
QRS DURATION, ECG06: 98 MS
QTC CALCULATION (BEZET), ECG08: 455 MS
RBC # BLD AUTO: 3.41 M/UL (ref 3.8–5.2)
RBC MORPH BLD: ABNORMAL
SODIUM SERPL-SCNC: 133 MMOL/L (ref 136–145)
VENTRICULAR RATE, ECG03: 82 BPM
WBC # BLD AUTO: 17.8 K/UL (ref 3.6–11)

## 2022-12-18 PROCEDURE — 65270000029 HC RM PRIVATE

## 2022-12-18 PROCEDURE — 85610 PROTHROMBIN TIME: CPT

## 2022-12-18 PROCEDURE — 80053 COMPREHEN METABOLIC PANEL: CPT

## 2022-12-18 PROCEDURE — 36415 COLL VENOUS BLD VENIPUNCTURE: CPT

## 2022-12-18 PROCEDURE — 74011000250 HC RX REV CODE- 250: Performed by: FAMILY MEDICINE

## 2022-12-18 PROCEDURE — 74011000258 HC RX REV CODE- 258: Performed by: FAMILY MEDICINE

## 2022-12-18 PROCEDURE — 74011250636 HC RX REV CODE- 250/636: Performed by: PHYSICIAN ASSISTANT

## 2022-12-18 PROCEDURE — 74011250636 HC RX REV CODE- 250/636: Performed by: FAMILY MEDICINE

## 2022-12-18 PROCEDURE — 74176 CT ABD & PELVIS W/O CONTRAST: CPT

## 2022-12-18 PROCEDURE — 74011250637 HC RX REV CODE- 250/637: Performed by: PHYSICIAN ASSISTANT

## 2022-12-18 PROCEDURE — 85025 COMPLETE CBC W/AUTO DIFF WBC: CPT

## 2022-12-18 PROCEDURE — 74011000250 HC RX REV CODE- 250: Performed by: PHYSICIAN ASSISTANT

## 2022-12-18 PROCEDURE — 74011000258 HC RX REV CODE- 258: Performed by: PHYSICIAN ASSISTANT

## 2022-12-18 PROCEDURE — 74011250637 HC RX REV CODE- 250/637: Performed by: FAMILY MEDICINE

## 2022-12-18 RX ORDER — HYDROCORTISONE 10 MG/ML
LOTION TOPICAL
Status: DISCONTINUED | OUTPATIENT
Start: 2022-12-18 | End: 2022-12-23 | Stop reason: HOSPADM

## 2022-12-18 RX ORDER — HYDROXYZINE 25 MG/1
25 TABLET, FILM COATED ORAL
Status: DISCONTINUED | OUTPATIENT
Start: 2022-12-18 | End: 2022-12-23 | Stop reason: HOSPADM

## 2022-12-18 RX ORDER — POTASSIUM CHLORIDE 750 MG/1
10 TABLET, FILM COATED, EXTENDED RELEASE ORAL 2 TIMES DAILY
Status: COMPLETED | OUTPATIENT
Start: 2022-12-18 | End: 2022-12-18

## 2022-12-18 RX ADMIN — HYDROCORTISONE: 0.01 LOTION TOPICAL at 17:01

## 2022-12-18 RX ADMIN — POTASSIUM CHLORIDE 10 MEQ: 750 TABLET, FILM COATED, EXTENDED RELEASE ORAL at 08:47

## 2022-12-18 RX ADMIN — NAPROXEN 500 MG: 250 TABLET ORAL at 08:48

## 2022-12-18 RX ADMIN — ACETAMINOPHEN 650 MG: 325 TABLET ORAL at 01:31

## 2022-12-18 RX ADMIN — CEFEPIME 2 G: 2 INJECTION, POWDER, FOR SOLUTION INTRAVENOUS at 05:21

## 2022-12-18 RX ADMIN — SODIUM CHLORIDE, PRESERVATIVE FREE 10 ML: 5 INJECTION INTRAVENOUS at 14:00

## 2022-12-18 RX ADMIN — Medication 3 MG: at 01:31

## 2022-12-18 RX ADMIN — POTASSIUM CHLORIDE 10 MEQ: 750 TABLET, FILM COATED, EXTENDED RELEASE ORAL at 17:01

## 2022-12-18 RX ADMIN — SERTRALINE 100 MG: 50 TABLET, FILM COATED ORAL at 08:48

## 2022-12-18 RX ADMIN — ONDANSETRON HYDROCHLORIDE 4 MG: 2 SOLUTION INTRAMUSCULAR; INTRAVENOUS at 01:24

## 2022-12-18 RX ADMIN — SODIUM CHLORIDE, PRESERVATIVE FREE 10 ML: 5 INJECTION INTRAVENOUS at 05:23

## 2022-12-18 RX ADMIN — METRONIDAZOLE 500 MG: 500 INJECTION, SOLUTION INTRAVENOUS at 08:44

## 2022-12-18 RX ADMIN — SODIUM CHLORIDE 1 G: 9 INJECTION INTRAMUSCULAR; INTRAVENOUS; SUBCUTANEOUS at 10:50

## 2022-12-18 RX ADMIN — HYDROCHLOROTHIAZIDE 25 MG: 25 TABLET ORAL at 17:00

## 2022-12-18 RX ADMIN — MEROPENEM 1 G: 1 INJECTION, POWDER, FOR SOLUTION INTRAVENOUS at 17:00

## 2022-12-18 RX ADMIN — NAPROXEN 500 MG: 250 TABLET ORAL at 17:01

## 2022-12-18 RX ADMIN — FOLIC ACID 1 MG: 1 TABLET ORAL at 08:48

## 2022-12-18 RX ADMIN — ACETAMINOPHEN 650 MG: 325 TABLET ORAL at 11:33

## 2022-12-18 NOTE — PROGRESS NOTES
Day #1 of Merrem  Indication:  Liver abscess  Current regimen:  1 gm IV q8h over 30 min  Recent Labs     22  0139 22  0425 22  1401   WBC 17.8* 15.6* 19.8*   CREA 0.56 0.51* 0.66   BUN 6 8 9     Est CrCl: 83 ml/min; UO: - ml/kg/hr  Temp (24hrs), Av °F (37.8 °C), Min:98.5 °F (36.9 °C), Max:102.8 °F (39.3 °C)    Plan:   Dose adjusted based on extended infusion beta lactam protocol to:  Merrem 1 gm LD over 3 minutes, followed by  1 gm IV q8h over 3 hours for crcl > 50.

## 2022-12-18 NOTE — PROGRESS NOTES
Bedside shift change report given to [id number] Dayana Alvarez, RN (oncoming nurse) by Sae Hinojosa RN (offgoing nurse). Report included the following information SBAR, Kardex, Intake/Output, and MAR.

## 2022-12-18 NOTE — PROGRESS NOTES
Patient  was reported to have vomited around 1900  hours . She was assess and given Zofran 4 mg sublingual. Patient vital signs was done. B/P- 111/75, P- 117  and temperature was 102. Patient IV access that had came out earlier was resite by RN charge and her IV antibiotic restarted as ordered. MD on call Lucia Pineda was informed of patient status. She was also given tylenol 650 mg PO. Reporyt was given to night RN. At time patient was doing better and fever was trending down heart rate had also improved.

## 2022-12-19 ENCOUNTER — APPOINTMENT (OUTPATIENT)
Dept: ULTRASOUND IMAGING | Age: 62
End: 2022-12-19
Attending: PHYSICIAN ASSISTANT
Payer: COMMERCIAL

## 2022-12-19 LAB
ALBUMIN SERPL-MCNC: 1.9 G/DL (ref 3.5–5)
ALBUMIN/GLOB SERPL: 0.4 {RATIO} (ref 1.1–2.2)
ALP SERPL-CCNC: 91 U/L (ref 45–117)
ALT SERPL-CCNC: 16 U/L (ref 12–78)
ANION GAP SERPL CALC-SCNC: 8 MMOL/L (ref 5–15)
AST SERPL-CCNC: 14 U/L (ref 15–37)
ATRIAL RATE: 84 BPM
BASOPHILS # BLD: 0 K/UL (ref 0–0.1)
BASOPHILS NFR BLD: 0 % (ref 0–1)
BILIRUB SERPL-MCNC: 0.5 MG/DL (ref 0.2–1)
BUN SERPL-MCNC: 8 MG/DL (ref 6–20)
BUN/CREAT SERPL: 17 (ref 12–20)
CALCIUM SERPL-MCNC: 8.6 MG/DL (ref 8.5–10.1)
CALCULATED P AXIS, ECG09: 26 DEGREES
CALCULATED R AXIS, ECG10: -9 DEGREES
CALCULATED T AXIS, ECG11: 27 DEGREES
CHLORIDE SERPL-SCNC: 94 MMOL/L (ref 97–108)
CO2 SERPL-SCNC: 33 MMOL/L (ref 21–32)
CREAT SERPL-MCNC: 0.46 MG/DL (ref 0.55–1.02)
DIAGNOSIS, 93000: NORMAL
DIFFERENTIAL METHOD BLD: ABNORMAL
EOSINOPHIL # BLD: 0.2 K/UL (ref 0–0.4)
EOSINOPHIL NFR BLD: 1 % (ref 0–7)
ERYTHROCYTE [DISTWIDTH] IN BLOOD BY AUTOMATED COUNT: 13.4 % (ref 11.5–14.5)
GLOBULIN SER CALC-MCNC: 4.8 G/DL (ref 2–4)
GLUCOSE SERPL-MCNC: 97 MG/DL (ref 65–100)
HCT VFR BLD AUTO: 30.8 % (ref 35–47)
HGB BLD-MCNC: 9.8 G/DL (ref 11.5–16)
IMM GRANULOCYTES # BLD AUTO: 0.2 K/UL (ref 0–0.04)
IMM GRANULOCYTES NFR BLD AUTO: 1 % (ref 0–0.5)
INR PPP: 1.3 (ref 0.9–1.1)
LYMPHOCYTES # BLD: 1.7 K/UL (ref 0.8–3.5)
LYMPHOCYTES NFR BLD: 11 % (ref 12–49)
MCH RBC QN AUTO: 30.5 PG (ref 26–34)
MCHC RBC AUTO-ENTMCNC: 31.8 G/DL (ref 30–36.5)
MCV RBC AUTO: 96 FL (ref 80–99)
MONOCYTES # BLD: 1.1 K/UL (ref 0–1)
MONOCYTES NFR BLD: 7 % (ref 5–13)
NEUTS SEG # BLD: 12.1 K/UL (ref 1.8–8)
NEUTS SEG NFR BLD: 80 % (ref 32–75)
NRBC # BLD: 0 K/UL (ref 0–0.01)
NRBC BLD-RTO: 0 PER 100 WBC
P-R INTERVAL, ECG05: 146 MS
PLATELET # BLD AUTO: 604 K/UL (ref 150–400)
PMV BLD AUTO: 9.6 FL (ref 8.9–12.9)
POTASSIUM SERPL-SCNC: 2.8 MMOL/L (ref 3.5–5.1)
PROT SERPL-MCNC: 6.7 G/DL (ref 6.4–8.2)
PROTHROMBIN TIME: 13 SEC (ref 9–11.1)
Q-T INTERVAL, ECG07: 386 MS
QRS DURATION, ECG06: 96 MS
QTC CALCULATION (BEZET), ECG08: 456 MS
RBC # BLD AUTO: 3.21 M/UL (ref 3.8–5.2)
RBC MORPH BLD: ABNORMAL
SODIUM SERPL-SCNC: 135 MMOL/L (ref 136–145)
VENTRICULAR RATE, ECG03: 84 BPM
WBC # BLD AUTO: 15.3 K/UL (ref 3.6–11)

## 2022-12-19 PROCEDURE — 87075 CULTR BACTERIA EXCEPT BLOOD: CPT

## 2022-12-19 PROCEDURE — 74011000250 HC RX REV CODE- 250: Performed by: PHYSICIAN ASSISTANT

## 2022-12-19 PROCEDURE — 77030018870 HC TY PARCNT BD -B

## 2022-12-19 PROCEDURE — 77030010546 HC BG URIN DRNG URES -B

## 2022-12-19 PROCEDURE — 87205 SMEAR GRAM STAIN: CPT

## 2022-12-19 PROCEDURE — 85025 COMPLETE CBC W/AUTO DIFF WBC: CPT

## 2022-12-19 PROCEDURE — 65270000029 HC RM PRIVATE

## 2022-12-19 PROCEDURE — C1769 GUIDE WIRE: HCPCS

## 2022-12-19 PROCEDURE — 74011250636 HC RX REV CODE- 250/636: Performed by: PHYSICIAN ASSISTANT

## 2022-12-19 PROCEDURE — 77030011229 HC DIL VESL COON COOK -A

## 2022-12-19 PROCEDURE — 74011000250 HC RX REV CODE- 250: Performed by: FAMILY MEDICINE

## 2022-12-19 PROCEDURE — 94760 N-INVAS EAR/PLS OXIMETRY 1: CPT

## 2022-12-19 PROCEDURE — 76942 ECHO GUIDE FOR BIOPSY: CPT

## 2022-12-19 PROCEDURE — 0F903ZX DRAINAGE OF LIVER, PERCUTANEOUS APPROACH, DIAGNOSTIC: ICD-10-PCS | Performed by: RADIOLOGY

## 2022-12-19 PROCEDURE — C1729 CATH, DRAINAGE: HCPCS

## 2022-12-19 PROCEDURE — 74011250637 HC RX REV CODE- 250/637: Performed by: FAMILY MEDICINE

## 2022-12-19 PROCEDURE — 74011000258 HC RX REV CODE- 258: Performed by: PHYSICIAN ASSISTANT

## 2022-12-19 PROCEDURE — 36415 COLL VENOUS BLD VENIPUNCTURE: CPT

## 2022-12-19 PROCEDURE — 2709999900 HC NON-CHARGEABLE SUPPLY

## 2022-12-19 PROCEDURE — 80053 COMPREHEN METABOLIC PANEL: CPT

## 2022-12-19 PROCEDURE — 85610 PROTHROMBIN TIME: CPT

## 2022-12-19 RX ORDER — FENTANYL CITRATE 50 UG/ML
25-200 INJECTION, SOLUTION INTRAMUSCULAR; INTRAVENOUS
Status: DISCONTINUED | OUTPATIENT
Start: 2022-12-19 | End: 2022-12-19 | Stop reason: ALTCHOICE

## 2022-12-19 RX ORDER — FLUMAZENIL 0.1 MG/ML
0.5 INJECTION INTRAVENOUS
Status: DISCONTINUED | OUTPATIENT
Start: 2022-12-19 | End: 2022-12-19 | Stop reason: ALTCHOICE

## 2022-12-19 RX ORDER — SODIUM BICARBONATE 42 MG/ML
2 INJECTION, SOLUTION INTRAVENOUS
Status: COMPLETED | OUTPATIENT
Start: 2022-12-19 | End: 2022-12-19

## 2022-12-19 RX ORDER — SODIUM CHLORIDE 9 MG/ML
25 INJECTION, SOLUTION INTRAVENOUS
Status: DISCONTINUED | OUTPATIENT
Start: 2022-12-19 | End: 2022-12-19 | Stop reason: ALTCHOICE

## 2022-12-19 RX ORDER — LIDOCAINE HYDROCHLORIDE 10 MG/ML
10 INJECTION, SOLUTION EPIDURAL; INFILTRATION; INTRACAUDAL; PERINEURAL
Status: COMPLETED | OUTPATIENT
Start: 2022-12-19 | End: 2022-12-19

## 2022-12-19 RX ORDER — MIDAZOLAM HYDROCHLORIDE 1 MG/ML
.5-2 INJECTION, SOLUTION INTRAMUSCULAR; INTRAVENOUS
Status: DISCONTINUED | OUTPATIENT
Start: 2022-12-19 | End: 2022-12-19 | Stop reason: ALTCHOICE

## 2022-12-19 RX ORDER — POTASSIUM CHLORIDE 7.45 MG/ML
10 INJECTION INTRAVENOUS
Status: COMPLETED | OUTPATIENT
Start: 2022-12-19 | End: 2022-12-19

## 2022-12-19 RX ADMIN — LIDOCAINE HYDROCHLORIDE 8 ML: 10 INJECTION, SOLUTION EPIDURAL; INFILTRATION; INTRACAUDAL; PERINEURAL at 16:57

## 2022-12-19 RX ADMIN — SODIUM CHLORIDE, PRESERVATIVE FREE 10 ML: 5 INJECTION INTRAVENOUS at 21:00

## 2022-12-19 RX ADMIN — FENTANYL CITRATE 25 MCG: 50 INJECTION INTRAMUSCULAR; INTRAVENOUS at 16:38

## 2022-12-19 RX ADMIN — FENTANYL CITRATE 25 MCG: 50 INJECTION INTRAMUSCULAR; INTRAVENOUS at 16:43

## 2022-12-19 RX ADMIN — Medication 3 MG: at 21:52

## 2022-12-19 RX ADMIN — POTASSIUM CHLORIDE 10 MEQ: 7.46 INJECTION, SOLUTION INTRAVENOUS at 10:43

## 2022-12-19 RX ADMIN — SODIUM BICARBONATE 84 MG: 42 INJECTION, SOLUTION INTRAVENOUS at 16:55

## 2022-12-19 RX ADMIN — ACETAMINOPHEN 650 MG: 325 TABLET ORAL at 06:08

## 2022-12-19 RX ADMIN — FOLIC ACID 1 MG: 1 TABLET ORAL at 08:25

## 2022-12-19 RX ADMIN — NAPROXEN 500 MG: 250 TABLET ORAL at 08:48

## 2022-12-19 RX ADMIN — POTASSIUM CHLORIDE 10 MEQ: 7.46 INJECTION, SOLUTION INTRAVENOUS at 08:22

## 2022-12-19 RX ADMIN — NAPROXEN 500 MG: 250 TABLET ORAL at 17:33

## 2022-12-19 RX ADMIN — MIDAZOLAM HYDROCHLORIDE 1 MG: 1 INJECTION, SOLUTION INTRAMUSCULAR; INTRAVENOUS at 16:36

## 2022-12-19 RX ADMIN — ACETAMINOPHEN 650 MG: 325 TABLET ORAL at 21:52

## 2022-12-19 RX ADMIN — MEROPENEM 1 G: 1 INJECTION, POWDER, FOR SOLUTION INTRAVENOUS at 21:52

## 2022-12-19 RX ADMIN — MEROPENEM 1 G: 1 INJECTION, POWDER, FOR SOLUTION INTRAVENOUS at 02:25

## 2022-12-19 RX ADMIN — SERTRALINE 100 MG: 50 TABLET, FILM COATED ORAL at 08:25

## 2022-12-19 RX ADMIN — SODIUM CHLORIDE, PRESERVATIVE FREE 10 ML: 5 INJECTION INTRAVENOUS at 08:22

## 2022-12-19 RX ADMIN — POTASSIUM CHLORIDE 10 MEQ: 7.46 INJECTION, SOLUTION INTRAVENOUS at 10:04

## 2022-12-19 RX ADMIN — POTASSIUM CHLORIDE 10 MEQ: 7.46 INJECTION, SOLUTION INTRAVENOUS at 11:34

## 2022-12-19 RX ADMIN — MEROPENEM 1 G: 1 INJECTION, POWDER, FOR SOLUTION INTRAVENOUS at 12:45

## 2022-12-19 NOTE — PROGRESS NOTES
Bedside shift change report given to dana (oncoming nurse) by kurtis (offgoing nurse). Report included the following information SBAR, Kardex, Intake/Output, and MAR.

## 2022-12-19 NOTE — PROGRESS NOTES
Transition Of Care: Noted plan for IR Liver Abscess drainage procedure today. I/D plans pending. No CM orders at this time. Family to transport. RUR: 14%    The patient is from home and lives with 2 adult sons. Infectious disease, IR, hospitalist following. The patient plans to discharge home. Readmission Assessment  Number of days since last admission?: 8-30 days  Previous disposition: Home with Family  Who is being interviewed?: Patient  What was the patient's/caregiver's perception as to why they think they needed to return back to the hospital?: Other (Comment) (The patient had a biopsy and was told to come back to hospital for procedure.)  Did you visit your Primary Care Physician after you left the hospital, before you returned this time?: No (The patient did not schedule an appointment yet.)  Did you see a specialist, such as Cardiac, Pulmonary, Orthopedic Physician, etc. after you left the hospital?: No (The patient is following up from recent admission and liver biopsy)  Who advised the patient to return to the hospital?: Physician  Does the patient report anything that got in the way of taking their medications?: No  In our efforts to provide the best possible care to you and others like you, can you think of anything that we could have done to help you after you left the hospital the first time, so that you might not have needed to return so soon?: Teach back instructions regarding management of illness, Teaching during hospitalization regarding your illness     Care Management Interventions  PCP Verified by CM: Yes  Palliative Care Criteria Met (RRAT>21 & CHF Dx)?: No  Mode of Transport at Discharge:  Other (see comment)  Transition of Care Consult (CM Consult): Discharge Planning  MyChart Signup: Yes  Discharge Durable Medical Equipment: No  Health Maintenance Reviewed: Yes  Physical Therapy Consult: No  Occupational Therapy Consult: No  Speech Therapy Consult: No  Support Systems: Child(mata)  Confirm Follow Up Transport: Family  The Plan for Transition of Care is Related to the Following Treatment Goals : The patient plans to discharge home.  Resource Information Provided?: No  Discharge Location  Patient Expects to be Discharged to[de-identified] Home with family assistance     Reason for Admission:  Liver Abscess                     RUR Score:   14%                  Plan for utilizing home health:   No indications at this time. PCP: First and Last name:  Zenon Farley MD or Dr. Grisel Zapata [phone]     Name of Practice:    Are you a current patient: Yes/No: Y   Approximate date of last visit: 2014   Can you participate in a virtual visit with your PCP: Y                    Current Advanced Directive/Advance Care Plan: Full Code      Healthcare Decision Maker:   Click here to complete [address] including selection of the Healthcare Decision Maker Relationship (ie \"Primary\")             Primary Decision MakerVilma Isiah - Aminata - [phone]                  Transition of Care Plan:        CM met with the patient in room 552. The patient is alert and oriented x4. Confirmed demographics. The patient is independent with ADL's/IADL's, drives a vehicle, uses [facility] in [place] and plans to discharge home where she lives with her 2 adult sons and daughter, Erica Haynes who lives down the street will transport her home when stable for discharge. The patient stated she was recently discharged from [facility] after having a liver biopsy and came back in to the hospital and plans to have a liver abscess drainage procedure done. The patient plan to discharge home with family to transport when stable for discharge. CM following for discharge needs.     Rashad Hanson RN/CRM

## 2022-12-19 NOTE — PROGRESS NOTES
[facility] Adult  Hospitalist Group                                                                                          Hospitalist Progress Note  [place], [place]  Answering service: [phone] OR [phone] from in house phone        Date of Service:  2022  NAME:  Ashely Antunez  :  1960  MRN:  [id number]      Admission Summary:   Ashely Antunez is a 63 y.o. female with a pmhx dyslipidemia, and ETOH dependence who presents with liver abscess. She presented initially to the ED about 2 weeks ago with fevers, diarrhea, vomiting, and weight loss. Work-up revealed stable liver masses, and sigmoid diverticulitis. She underwent colonoscopy which was unremarkable, and had a liver biopsy performed. In the ED pt was febrile to 100.5, CT abd/pelvis demonstrated increase in size of several of the liver masses have increased in size. Started on cefepime and flagyl, consult placed to IR       Interval history / Subjective:      No acute interval events. Spoke with the interventional radiologist via phone this morning. Strongly advocate for IR drainage of abscess today given pt's clinical presentation as well as her fever of 102F with leukocytosis and vomiting yesterday (). Today she is tearful as the potassium repletion is burning her arm, RN will dilute the K+ with NS. Her abdominal pain is better than yesterday, with some tenderness in the RUQ. She states she is not sleeping well as she is waking up with chills and night sweats.     Assessment & Plan:     Liver abscess   Leukocytosis   - CT abd/pelvis: Multiple thick walled low-attenuation hepatic masses with the largest measuring 10.4x8.2cm (previously 9.0 x 6.6 cm on ) Focal wall thickening in the sigmoid colon, with probable 2.2 cm intramural abscess  intramural abscess  - BCX: in process,    - ABX: Broadened to meropenem, will add Vanc if there is a need for MRSA coverage.  - Suspect patient will need a prolonged course of ABX, will order a PICC if necessary  - Consult placed to ID on (12/17), will follow up their reccomendations  - IR consult placed (12/16)  - 12/17: Paged on call IR, Dr. Juventino Blair, regarding liver abscesses and possible need for urgent drainage. IR to place drain(s) Monday  - NPO at midnight tonight   - 12/18: Spoke with IR on the phone today, strongly advocate for drainage of abscess today, they will call the RN for scheduling     Depression and anxiety  - Continue home Zoloft  - PRN ativan     Hypertension  - Continue home hydrochlorothiazide    Past history of EtOH dependence  - Has not had any alcohol for several weeks since diagnosis  - Continue home folic acid    Skin Rash: improved  - Improved with cessation of cefepime  - Possibly a  reaction to cefepime as she has a documented allergy of rash to penicillins  - Pt not experiencing any symptoms of anaphylaxis at this time  - closely monitor the patient     Code status: Full  Prophylaxis: SCDs  Care Plan discussed with: patient, RN, CM  Anticipated Disposition: TBD, likely home with [facility] Problems  Date Reviewed: 12/8/2022            Codes Class Noted POA    Liver abscess ICD-10-CM: K75.0  ICD-9-CM: 572.0  12/16/2022 Unknown         Review of Systems:   A comprehensive review of systems was negative except for that written in the HPI. Vital Signs:    Last 24hrs VS reviewed since prior progress note.  Most recent are:  Visit Vitals  /68 (BP 1 Location: Right upper arm, BP Patient Position: Sitting)   Pulse 87   Temp 98.6 °F (37 °C)   Resp 19   Ht 5' 4\" (1.626 m)   Wt 71.2 kg (156 lb 15.5 oz)   SpO2 96%   BMI 26.94 kg/m²       No intake or output data in the 24 hours ending 12/19/22 0911       Physical Examination:     I had a face to face encounter with this patient and independently examined them on 12/19/2022 as outlined below:          Constitutional:  No acute distress, cooperative, pleasant    ENT:  Oral mucosa moist, oropharynx benign. Resp:  CTA bilaterally. No wheezing/rhonchi/rales. No accessory muscle use. CV:  Regular rhythm, normal rate, no murmurs, gallops, rubs    GI:  Tender to palpation in RUQ, no rebound or guarding, soft, non distended, hypoactive bowel sounds,     Musculoskeletal:  Rash present on abdomen, No edema, warm, 2+ pulses throughout    Neurologic:  Moves all extremities. AAOx3, CN II-XII intact, sensation intact            Data Review:    Review and/or order of clinical lab test  Review and/or order of tests in the radiology section of CPT  Review and/or order of tests in the medicine section of CPT      Labs:     Recent Labs     12/19/22 0229 12/18/22 0139   WBC 15.3* 17.8*   HGB 9.8* 10.4*   HCT 30.8* 31.2*   * 621*       Recent Labs     12/19/22 0229 12/18/22 0139 12/17/22  0425   * 133* 136   K 2.8* 3.0* 3.0*   CL 94* 95* 101   CO2 33* 32 30   BUN 8 6 8   CREA 0.46* 0.56 0.51*   GLU 97 107* 96   CA 8.6 9.2 9.0       Recent Labs     12/19/22 0229 12/18/22 0139 12/17/22  0425   ALT 16 22 21   AP 91 109 106   TBILI 0.5 0.6 0.6   TP 6.7 7.3 6.7   ALB 1.9* 1.9* 1.8*   GLOB 4.8* 5.4* 4.9*       Recent Labs     12/19/22 0229 12/18/22 0139   INR 1.3* 1.3*   PTP 13.0* 12.9*        No results for input(s): FE, TIBC, PSAT, FERR in the last 72 hours. No results found for: FOL, RBCF   No results for input(s): PH, PCO2, PO2 in the last 72 hours. No results for input(s): CPK, CKNDX, TROIQ in the last 72 hours.     No lab exists for component: CPKMB  No results found for: CHOL, CHOLX, CHLST, CHOLV, HDL, HDLP, LDL, LDLC, DLDLP, TGLX, TRIGL, TRIGP, CHHD, CHHDX  No results found for: [facility]  Lab Results   Component Value Date/Time    Color YELLOW/STRAW 12/17/2022 01:11 AM    Appearance CLEAR 12/17/2022 01:11 AM    Specific gravity 1.022 12/17/2022 01:11 AM    pH (UA) 6.0 12/17/2022 01:11 AM    Protein Negative 12/17/2022 01:11 AM    Glucose Negative 12/17/2022 01:11 AM    Ketone TRACE (A) 12/17/2022 01:11 AM    Bilirubin Negative 12/17/2022 01:11 AM    Urobilinogen 0.2 12/17/2022 01:11 AM    Nitrites Negative 12/17/2022 01:11 AM    Leukocyte Esterase Negative 12/17/2022 01:11 AM    Epithelial cells FEW 12/07/2022 01:14 AM    Bacteria 1+ (A) 12/07/2022 01:14 AM    WBC 0-4 12/07/2022 01:14 AM    RBC 5-10 12/07/2022 01:14 AM         Medications Reviewed:     Current Facility-Administered Medications   Medication Dose Route Frequency    potassium chloride 10 mEq in 100 ml IVPB  10 mEq IntraVENous Q1H    meropenem (MERREM) 1 g in 0.9% sodium chloride (MBP/ADV) 50 mL MBP  1 g IntraVENous Q8H    hydrOXYzine HCL (ATARAX) tablet 25 mg  25 mg Oral TID PRN    hydrocortisone (ALA-LUCÍA) 1 % lotion   Topical BID PRN    folic acid (FOLVITE) tablet 1 mg  1 mg Oral DAILY    melatonin tablet 3 mg  3 mg Oral QHS PRN    naproxen (NAPROSYN) tablet 500 mg  500 mg Oral BID WITH MEALS    hydroCHLOROthiazide (HYDRODIURIL) tablet 25 mg  25 mg Oral DAILY    LORazepam (ATIVAN) tablet 0.5 mg  0.5 mg Oral DAILY PRN    sertraline (ZOLOFT) tablet 100 mg  100 mg Oral DAILY    sodium chloride (NS) flush 5-40 mL  5-40 mL IntraVENous Q8H    sodium chloride (NS) flush 5-40 mL  5-40 mL IntraVENous PRN    acetaminophen (TYLENOL) tablet 650 mg  650 mg Oral Q6H PRN    Or    acetaminophen (TYLENOL) suppository 650 mg  650 mg Rectal Q6H PRN    ondansetron (ZOFRAN ODT) tablet 4 mg  4 mg Oral Q8H PRN    Or    ondansetron (ZOFRAN) injection 4 mg  4 mg IntraVENous Q6H PRN     ______________________________________________________________________  EXPECTED LENGTH OF STAY: 5d 0h  ACTUAL LENGTH OF STAY:          3                 Angel M Milligram, PAKeturahC

## 2022-12-19 NOTE — ROUTINE PROCESS
Pt arrives via stretcher to angio department accompanied by stretcher for liver abscess drainage procedure. All assessments completed and consent was reviewed. Education given was regarding procedure, no sedation, post-procedure care and  management/follow-up. Opportunity for questions was provided and all questions and concerns were addressed.

## 2022-12-19 NOTE — PROGRESS NOTES
Bedside and Verbal shift change report given to Namrata Olson (oncoming nurse) by Pedro Cardoso (offgoing nurse). Report included the following information SBAR and Kardex.

## 2022-12-19 NOTE — ROUTINE PROCESS
TRANSFER - OUT REPORT:    Verbal report given to [facility]) on Ashely Antunez  being transferred to [facility](unit) for routine progression of care       Report consisted of patients Situation, Background, Assessment and   Recommendations(SBAR). Information from the following report(s) SBAR, Kardex, Procedure Summary, and MAR was reviewed with the receiving nurse. Lines:   Peripheral IV 12/17/22 Left;Posterior Forearm (Active)   Site Assessment Clean, dry, & intact 12/19/22 1245   Phlebitis Assessment 0 12/19/22 1245   Infiltration Assessment 0 12/19/22 1245   Dressing Status Clean, dry, & intact 12/19/22 1245   Dressing Type Transparent;Tape 12/19/22 1245   Hub Color/Line Status Blue; Infusing 12/19/22 1245   Action Taken Open ports on tubing capped 12/18/22 0842   Alcohol Cap Used Yes 12/18/22 0842        Opportunity for questions and clarification was provided.       Patient transported with:   [employer]

## 2022-12-20 LAB
ALBUMIN SERPL-MCNC: 1.8 G/DL (ref 3.5–5)
ALBUMIN/GLOB SERPL: 0.4 {RATIO} (ref 1.1–2.2)
ALP SERPL-CCNC: 86 U/L (ref 45–117)
ALT SERPL-CCNC: 15 U/L (ref 12–78)
ANION GAP SERPL CALC-SCNC: 5 MMOL/L (ref 5–15)
AST SERPL-CCNC: 14 U/L (ref 15–37)
BASOPHILS # BLD: 0.1 K/UL (ref 0–0.1)
BASOPHILS NFR BLD: 1 % (ref 0–1)
BILIRUB SERPL-MCNC: 0.3 MG/DL (ref 0.2–1)
BUN SERPL-MCNC: 12 MG/DL (ref 6–20)
BUN/CREAT SERPL: 26 (ref 12–20)
CALCIUM SERPL-MCNC: 8.3 MG/DL (ref 8.5–10.1)
CHLORIDE SERPL-SCNC: 98 MMOL/L (ref 97–108)
CO2 SERPL-SCNC: 33 MMOL/L (ref 21–32)
CREAT SERPL-MCNC: 0.46 MG/DL (ref 0.55–1.02)
DIFFERENTIAL METHOD BLD: ABNORMAL
EOSINOPHIL # BLD: 0.2 K/UL (ref 0–0.4)
EOSINOPHIL NFR BLD: 1 % (ref 0–7)
ERYTHROCYTE [DISTWIDTH] IN BLOOD BY AUTOMATED COUNT: 13.7 % (ref 11.5–14.5)
GLOBULIN SER CALC-MCNC: 4.8 G/DL (ref 2–4)
GLUCOSE SERPL-MCNC: 105 MG/DL (ref 65–100)
HCT VFR BLD AUTO: 30.1 % (ref 35–47)
HGB BLD-MCNC: 9.6 G/DL (ref 11.5–16)
IMM GRANULOCYTES # BLD AUTO: 0.1 K/UL (ref 0–0.04)
IMM GRANULOCYTES NFR BLD AUTO: 1 % (ref 0–0.5)
LYMPHOCYTES # BLD: 2 K/UL (ref 0.8–3.5)
LYMPHOCYTES NFR BLD: 16 % (ref 12–49)
MCH RBC QN AUTO: 29.7 PG (ref 26–34)
MCHC RBC AUTO-ENTMCNC: 31.9 G/DL (ref 30–36.5)
MCV RBC AUTO: 93.2 FL (ref 80–99)
MONOCYTES # BLD: 0.6 K/UL (ref 0–1)
MONOCYTES NFR BLD: 5 % (ref 5–13)
NEUTS SEG # BLD: 9.6 K/UL (ref 1.8–8)
NEUTS SEG NFR BLD: 76 % (ref 32–75)
NRBC # BLD: 0 K/UL (ref 0–0.01)
NRBC BLD-RTO: 0 PER 100 WBC
PLATELET # BLD AUTO: 599 K/UL (ref 150–400)
PMV BLD AUTO: 9.5 FL (ref 8.9–12.9)
POTASSIUM SERPL-SCNC: 3.3 MMOL/L (ref 3.5–5.1)
PROT SERPL-MCNC: 6.6 G/DL (ref 6.4–8.2)
RBC # BLD AUTO: 3.23 M/UL (ref 3.8–5.2)
SODIUM SERPL-SCNC: 136 MMOL/L (ref 136–145)
WBC # BLD AUTO: 12.5 K/UL (ref 3.6–11)

## 2022-12-20 PROCEDURE — 80053 COMPREHEN METABOLIC PANEL: CPT

## 2022-12-20 PROCEDURE — 74011250637 HC RX REV CODE- 250/637: Performed by: FAMILY MEDICINE

## 2022-12-20 PROCEDURE — 74011000250 HC RX REV CODE- 250: Performed by: FAMILY MEDICINE

## 2022-12-20 PROCEDURE — 85025 COMPLETE CBC W/AUTO DIFF WBC: CPT

## 2022-12-20 PROCEDURE — 74011000258 HC RX REV CODE- 258: Performed by: PHYSICIAN ASSISTANT

## 2022-12-20 PROCEDURE — 65270000029 HC RM PRIVATE

## 2022-12-20 PROCEDURE — 74011250636 HC RX REV CODE- 250/636: Performed by: PHYSICIAN ASSISTANT

## 2022-12-20 PROCEDURE — 74011250637 HC RX REV CODE- 250/637: Performed by: PHYSICIAN ASSISTANT

## 2022-12-20 PROCEDURE — 36415 COLL VENOUS BLD VENIPUNCTURE: CPT

## 2022-12-20 RX ORDER — OXYCODONE AND ACETAMINOPHEN 5; 325 MG/1; MG/1
1 TABLET ORAL
Status: DISCONTINUED | OUTPATIENT
Start: 2022-12-20 | End: 2022-12-23 | Stop reason: HOSPADM

## 2022-12-20 RX ADMIN — MEROPENEM 1 G: 1 INJECTION, POWDER, FOR SOLUTION INTRAVENOUS at 20:30

## 2022-12-20 RX ADMIN — NAPROXEN 500 MG: 250 TABLET ORAL at 08:27

## 2022-12-20 RX ADMIN — MEROPENEM 1 G: 1 INJECTION, POWDER, FOR SOLUTION INTRAVENOUS at 06:11

## 2022-12-20 RX ADMIN — SODIUM CHLORIDE, PRESERVATIVE FREE 10 ML: 5 INJECTION INTRAVENOUS at 06:11

## 2022-12-20 RX ADMIN — ACETAMINOPHEN 650 MG: 325 TABLET ORAL at 12:35

## 2022-12-20 RX ADMIN — NAPROXEN 500 MG: 250 TABLET ORAL at 17:06

## 2022-12-20 RX ADMIN — OXYCODONE AND ACETAMINOPHEN 1 TABLET: 5; 325 TABLET ORAL at 14:19

## 2022-12-20 RX ADMIN — OXYCODONE AND ACETAMINOPHEN 1 TABLET: 5; 325 TABLET ORAL at 20:30

## 2022-12-20 RX ADMIN — HYDROCHLOROTHIAZIDE 25 MG: 25 TABLET ORAL at 08:27

## 2022-12-20 RX ADMIN — SERTRALINE 100 MG: 50 TABLET, FILM COATED ORAL at 08:27

## 2022-12-20 RX ADMIN — ACETAMINOPHEN 650 MG: 325 TABLET ORAL at 02:55

## 2022-12-20 RX ADMIN — FOLIC ACID 1 MG: 1 TABLET ORAL at 08:27

## 2022-12-20 RX ADMIN — MEROPENEM 1 G: 1 INJECTION, POWDER, FOR SOLUTION INTRAVENOUS at 12:35

## 2022-12-20 NOTE — PROGRESS NOTES
[facility] Adult  Hospitalist Group                                                                                          Hospitalist Progress Note  [place], [place]  Answering service: [phone] or [phone] from in house phone        Date of Service:  2022  NAME:  Carina Cote  :  1960  MRN:  [id number]      Admission Summary:   Carina Cote is a 63 y.o. female with a pmhx dyslipidemia, and ETOH dependence who presents with liver abscess. She presented initially to the ED about 2 weeks ago with fevers, diarrhea, vomiting, and weight loss. Work-up revealed stable liver masses, and sigmoid diverticulitis. She underwent colonoscopy which was unremarkable, and had a liver biopsy performed. In the ED pt was febrile to 100.5, CT abd/pelvis demonstrated increase in size of several of the liver masses have increased in size. Started on cefepime and flagyl, consult placed to IR       Interval history / Subjective:      No acute interval events. S/P IR placement of drain for liver abscess. She endorses some pain at her drain site, will add PRN pain medication. Awaiting speciation and susceptibilities for long term ABX plan    Assessment & Plan:     Liver abscess   Leukocytosis   - CT abd/pelvis: Multiple thick walled low-attenuation hepatic masses with the largest measuring 10.4x8.2cm (previously 9.0 x 6.6 cm on ) Focal wall thickening in the sigmoid colon, with probable 2.2 cm intramural abscess  intramural abscess  - BCX: in process,    - ABX: Broadened to meropenem, will add Vanc if there is a need for MRSA coverage.  - Suspect patient will need a prolonged course of ABX, will order a PICC if necessary  - Consult placed to ID on (), will follow up their reccomendations  - IR consult placed ()  - : Paged on call IR, Dr. Oral Michelle, regarding liver abscesses and possible need for urgent drainage.  IR to place drain(s) Monday  - NPO at midnight tonight - 12/19: Spoke with IR on the phone today, strongly advocate for drainage of abscess today, they will call the RN for scheduling  - 12/20: S/P IR drain placement on 12/19. ID following for long term ABX, will need PICC at discharge     Depression and anxiety  - Continue home Zoloft  - PRN ativan     Hypertension  - Continue home hydrochlorothiazide    Past history of EtOH dependence  - Has not had any alcohol for several weeks since diagnosis  - Continue home folic acid    Skin Rash: improved  - Improved with cessation of cefepime  - Possibly a  reaction to cefepime as she has a documented allergy of rash to penicillins  - Pt not experiencing any symptoms of anaphylaxis at this time  - closely monitor the patient     Code status: Full  Prophylaxis: SCDs  Care Plan discussed with: patient, RN, CM  Anticipated Disposition: TBD, likely home with [facility]  Date Reviewed: 12/8/2022            Codes Class Noted POA    Liver abscess ICD-10-CM: K75.0  ICD-9-CM: 572.0  12/16/2022 Unknown         Review of Systems:   A comprehensive review of systems was negative except for that written in the HPI. Vital Signs:    Last 24hrs VS reviewed since prior progress note. Most recent are:  Visit Vitals  /67 (BP 1 Location: Right upper arm, BP Patient Position: At rest)   Pulse 85   Temp 97 °F (36.1 °C)   Resp 20   Ht 5' 4\" (1.626 m)   Wt 71.2 kg (156 lb 15.5 oz)   SpO2 95%   BMI 26.94 kg/m²         Intake/Output Summary (Last 24 hours) at 12/20/2022 1256  Last data filed at 12/19/2022 1903  Gross per 24 hour   Intake --   Output 70 ml   Net -70 ml          Physical Examination:     I had a face to face encounter with this patient and independently examined them on 12/20/2022 as outlined below:          Constitutional:  No acute distress, cooperative, pleasant    ENT:  Oral mucosa moist, oropharynx benign. Resp:  CTA bilaterally. No wheezing/rhonchi/rales. No accessory muscle use.     CV:  Regular rhythm, normal rate, no murmurs, gallops, rubs    GI:  RUQ drain in place dressing C/D/I, serosanguinous output in drainTender to palpation in RUQ, no rebound or guarding, soft, non distended, hypoactive bowel sounds,     Musculoskeletal:  Rash present on abdomen, No edema, warm, 2+ pulses throughout    Neurologic:  Moves all extremities. AAOx3, CN II-XII intact, sensation intact            Data Review:    Review and/or order of clinical lab test  Review and/or order of tests in the radiology section of CPT  Review and/or order of tests in the medicine section of CPT      Labs:     Recent Labs     12/20/22 0301 12/19/22 0229   WBC 12.5* 15.3*   HGB 9.6* 9.8*   HCT 30.1* 30.8*   * 604*       Recent Labs     12/20/22 0301 12/19/22 0229 12/18/22 0139    135* 133*   K 3.3* 2.8* 3.0*   CL 98 94* 95*   CO2 33* 33* 32   BUN 12 8 6   CREA 0.46* 0.46* 0.56   * 97 107*   CA 8.3* 8.6 9.2       Recent Labs     12/20/22 0301 12/19/22 0229 12/18/22 0139   ALT 15 16 22   AP 86 91 109   TBILI 0.3 0.5 0.6   TP 6.6 6.7 7.3   ALB 1.8* 1.9* 1.9*   GLOB 4.8* 4.8* 5.4*       Recent Labs     12/19/22 0229 12/18/22 0139   INR 1.3* 1.3*   PTP 13.0* 12.9*        No results for input(s): FE, TIBC, PSAT, FERR in the last 72 hours. No results found for: FOL, RBCF   No results for input(s): PH, PCO2, PO2 in the last 72 hours. No results for input(s): CPK, CKNDX, TROIQ in the last 72 hours.     No lab exists for component: CPKMB  No results found for: CHOL, CHOLX, CHLST, CHOLV, HDL, HDLP, LDL, LDLC, DLDLP, TGLX, TRIGL, TRIGP, CHHD, CHHDX  No results found for: [facility]  Lab Results   Component Value Date/Time    Color YELLOW/STRAW 12/17/2022 01:11 AM    Appearance CLEAR 12/17/2022 01:11 AM    Specific gravity 1.022 12/17/2022 01:11 AM    pH (UA) 6.0 12/17/2022 01:11 AM    Protein Negative 12/17/2022 01:11 AM    Glucose Negative 12/17/2022 01:11 AM    Ketone TRACE (A) 12/17/2022 01:11 AM    Bilirubin Negative 12/17/2022 01:11 AM    Urobilinogen 0.2 12/17/2022 01:11 AM    Nitrites Negative 12/17/2022 01:11 AM    Leukocyte Esterase Negative 12/17/2022 01:11 AM    Epithelial cells FEW 12/07/2022 01:14 AM    Bacteria 1+ (A) 12/07/2022 01:14 AM    WBC 0-4 12/07/2022 01:14 AM    RBC 5-10 12/07/2022 01:14 AM         Medications Reviewed:     Current Facility-Administered Medications   Medication Dose Route Frequency    meropenem (MERREM) 1 g in 0.9% sodium chloride (MBP/ADV) 50 mL MBP  1 g IntraVENous Q8H    hydrOXYzine HCL (ATARAX) tablet 25 mg  25 mg Oral TID PRN    hydrocortisone (ALA-LUCÍA) 1 % lotion   Topical BID PRN    folic acid (FOLVITE) tablet 1 mg  1 mg Oral DAILY    melatonin tablet 3 mg  3 mg Oral QHS PRN    naproxen (NAPROSYN) tablet 500 mg  500 mg Oral BID WITH MEALS    hydroCHLOROthiazide (HYDRODIURIL) tablet 25 mg  25 mg Oral DAILY    LORazepam (ATIVAN) tablet 0.5 mg  0.5 mg Oral DAILY PRN    sertraline (ZOLOFT) tablet 100 mg  100 mg Oral DAILY    sodium chloride (NS) flush 5-40 mL  5-40 mL IntraVENous Q8H    sodium chloride (NS) flush 5-40 mL  5-40 mL IntraVENous PRN    acetaminophen (TYLENOL) tablet 650 mg  650 mg Oral Q6H PRN    Or    acetaminophen (TYLENOL) suppository 650 mg  650 mg Rectal Q6H PRN    ondansetron (ZOFRAN ODT) tablet 4 mg  4 mg Oral Q8H PRN    Or    ondansetron (ZOFRAN) injection 4 mg  4 mg IntraVENous Q6H PRN     ______________________________________________________________________  EXPECTED LENGTH OF STAY: 5d 0h  ACTUAL LENGTH OF STAY:          4                 David M Milligram, PA-C

## 2022-12-20 NOTE — PROGRESS NOTES
Transition Of Care: Final ID orders pending. Referrals pending for home health and [facility]. Noted, plans for long term antimicrobial therapy for 4-6 weeks. Family to transport. RUR: 14%    The patient is from home and lives with 2 adult sons. Infectious disease, IR, hospitalist following. The patient plans to discharge home. CM following for discharge planning.      Valentino Fortune RN/CRM

## 2022-12-20 NOTE — PROGRESS NOTES
Problem: Falls - Risk of  Goal: *Absence of Falls  Description: Document Andrews Delgado Fall Risk and appropriate interventions in the flowsheet.   Outcome: Progressing Towards Goal  Note: Fall Risk Interventions:            Medication Interventions: Assess postural VS orthostatic hypotension, Evaluate medications/consider consulting pharmacy, Patient to call before getting OOB, Teach patient to arise slowly    Elimination Interventions: Call light in reach

## 2022-12-20 NOTE — PROGRESS NOTES
Bedside shift change report given to [facility] RN (oncoming nurse) by Holly Siddiqui (offgoing nurse). Report included the following information SBAR, Kardex, Procedure Summary, Intake/Output, MAR, and Recent Results.

## 2022-12-20 NOTE — PROGRESS NOTES
Bedside and Verbal shift change report given to Daniel Rob RN (oncoming nurse) by Frandy Coronado RN (offgoing nurse). Report included the following information SBAR and Kardex.

## 2022-12-20 NOTE — PROGRESS NOTES
A Spiritual Care Partner Volunteer visited patient in Room 552 on 12/20/2022  Documented by:  Chaplain Hanna MDiv, MS, [facility]

## 2022-12-21 ENCOUNTER — APPOINTMENT (OUTPATIENT)
Dept: CT IMAGING | Age: 62
End: 2022-12-21
Attending: PHYSICIAN ASSISTANT
Payer: COMMERCIAL

## 2022-12-21 LAB
ALBUMIN SERPL-MCNC: 2.1 G/DL (ref 3.5–5)
ALBUMIN/GLOB SERPL: 0.4 {RATIO} (ref 1.1–2.2)
ALP SERPL-CCNC: 86 U/L (ref 45–117)
ALT SERPL-CCNC: 15 U/L (ref 12–78)
ANION GAP SERPL CALC-SCNC: 5 MMOL/L (ref 5–15)
AST SERPL-CCNC: 16 U/L (ref 15–37)
BASOPHILS # BLD: 0.1 K/UL (ref 0–0.1)
BASOPHILS NFR BLD: 1 % (ref 0–1)
BILIRUB SERPL-MCNC: 0.2 MG/DL (ref 0.2–1)
BUN SERPL-MCNC: 9 MG/DL (ref 6–20)
BUN/CREAT SERPL: 18 (ref 12–20)
CALCIUM SERPL-MCNC: 9.4 MG/DL (ref 8.5–10.1)
CHLORIDE SERPL-SCNC: 97 MMOL/L (ref 97–108)
CO2 SERPL-SCNC: 34 MMOL/L (ref 21–32)
CREAT SERPL-MCNC: 0.51 MG/DL (ref 0.55–1.02)
DIFFERENTIAL METHOD BLD: ABNORMAL
EOSINOPHIL # BLD: 0.2 K/UL (ref 0–0.4)
EOSINOPHIL NFR BLD: 2 % (ref 0–7)
ERYTHROCYTE [DISTWIDTH] IN BLOOD BY AUTOMATED COUNT: 13.5 % (ref 11.5–14.5)
GLOBULIN SER CALC-MCNC: 5.1 G/DL (ref 2–4)
GLUCOSE SERPL-MCNC: 95 MG/DL (ref 65–100)
HCT VFR BLD AUTO: 35.1 % (ref 35–47)
HGB BLD-MCNC: 10.9 G/DL (ref 11.5–16)
IMM GRANULOCYTES # BLD AUTO: 0.1 K/UL (ref 0–0.04)
IMM GRANULOCYTES NFR BLD AUTO: 1 % (ref 0–0.5)
LYMPHOCYTES # BLD: 1.7 K/UL (ref 0.8–3.5)
LYMPHOCYTES NFR BLD: 16 % (ref 12–49)
MCH RBC QN AUTO: 30.2 PG (ref 26–34)
MCHC RBC AUTO-ENTMCNC: 31.1 G/DL (ref 30–36.5)
MCV RBC AUTO: 97.2 FL (ref 80–99)
MONOCYTES # BLD: 0.5 K/UL (ref 0–1)
MONOCYTES NFR BLD: 5 % (ref 5–13)
NEUTS SEG # BLD: 7.9 K/UL (ref 1.8–8)
NEUTS SEG NFR BLD: 75 % (ref 32–75)
NRBC # BLD: 0 K/UL (ref 0–0.01)
NRBC BLD-RTO: 0 PER 100 WBC
PLATELET # BLD AUTO: 656 K/UL (ref 150–400)
PLATELET COMMENTS,PCOM: ABNORMAL
PMV BLD AUTO: 9.7 FL (ref 8.9–12.9)
POTASSIUM SERPL-SCNC: 3.6 MMOL/L (ref 3.5–5.1)
PROT SERPL-MCNC: 7.2 G/DL (ref 6.4–8.2)
RBC # BLD AUTO: 3.61 M/UL (ref 3.8–5.2)
RBC MORPH BLD: ABNORMAL
SODIUM SERPL-SCNC: 136 MMOL/L (ref 136–145)
WBC # BLD AUTO: 10.5 K/UL (ref 3.6–11)

## 2022-12-21 PROCEDURE — 74011000636 HC RX REV CODE- 636: Performed by: RADIOLOGY

## 2022-12-21 PROCEDURE — 36573 INSJ PICC RS&I 5 YR+: CPT | Performed by: PHYSICIAN ASSISTANT

## 2022-12-21 PROCEDURE — 74011250637 HC RX REV CODE- 250/637: Performed by: FAMILY MEDICINE

## 2022-12-21 PROCEDURE — 76937 US GUIDE VASCULAR ACCESS: CPT

## 2022-12-21 PROCEDURE — 77030020365 HC SOL INJ SOD CL 0.9% 50ML

## 2022-12-21 PROCEDURE — 74011000258 HC RX REV CODE- 258: Performed by: PHYSICIAN ASSISTANT

## 2022-12-21 PROCEDURE — 74011250636 HC RX REV CODE- 250/636: Performed by: PHYSICIAN ASSISTANT

## 2022-12-21 PROCEDURE — C1751 CATH, INF, PER/CENT/MIDLINE: HCPCS

## 2022-12-21 PROCEDURE — 85025 COMPLETE CBC W/AUTO DIFF WBC: CPT

## 2022-12-21 PROCEDURE — 74177 CT ABD & PELVIS W/CONTRAST: CPT

## 2022-12-21 PROCEDURE — 74011000250 HC RX REV CODE- 250: Performed by: FAMILY MEDICINE

## 2022-12-21 PROCEDURE — 80053 COMPREHEN METABOLIC PANEL: CPT

## 2022-12-21 PROCEDURE — 36415 COLL VENOUS BLD VENIPUNCTURE: CPT

## 2022-12-21 PROCEDURE — 02HV33Z INSERTION OF INFUSION DEVICE INTO SUPERIOR VENA CAVA, PERCUTANEOUS APPROACH: ICD-10-PCS | Performed by: FAMILY MEDICINE

## 2022-12-21 PROCEDURE — 65270000029 HC RM PRIVATE

## 2022-12-21 RX ADMIN — FOLIC ACID 1 MG: 1 TABLET ORAL at 09:09

## 2022-12-21 RX ADMIN — SODIUM CHLORIDE, PRESERVATIVE FREE 10 ML: 5 INJECTION INTRAVENOUS at 21:17

## 2022-12-21 RX ADMIN — MEROPENEM 1 G: 1 INJECTION, POWDER, FOR SOLUTION INTRAVENOUS at 13:17

## 2022-12-21 RX ADMIN — NAPROXEN 500 MG: 250 TABLET ORAL at 17:54

## 2022-12-21 RX ADMIN — NAPROXEN 500 MG: 250 TABLET ORAL at 09:09

## 2022-12-21 RX ADMIN — MEROPENEM 1 G: 1 INJECTION, POWDER, FOR SOLUTION INTRAVENOUS at 21:16

## 2022-12-21 RX ADMIN — IOPAMIDOL 100 ML: 755 INJECTION, SOLUTION INTRAVENOUS at 14:31

## 2022-12-21 RX ADMIN — SODIUM CHLORIDE, PRESERVATIVE FREE 10 ML: 5 INJECTION INTRAVENOUS at 13:22

## 2022-12-21 RX ADMIN — HYDROCHLOROTHIAZIDE 25 MG: 25 TABLET ORAL at 09:09

## 2022-12-21 RX ADMIN — MEROPENEM 1 G: 1 INJECTION, POWDER, FOR SOLUTION INTRAVENOUS at 04:59

## 2022-12-21 RX ADMIN — SERTRALINE 100 MG: 50 TABLET, FILM COATED ORAL at 09:09

## 2022-12-21 NOTE — PROCEDURES
PICC Placement Note    PRE-PROCEDURE VERIFICATION  Correct Procedure: yes  Correct Site:  yes  Temperature: Temp: 98.5 °F (36.9 °C), Temperature Source: Temp Source: Oral  Recent Labs     12/21/22  0508 12/20/22  0301 12/19/22  0229   BUN 9   < > 8   CREA 0.51*   < > 0.46*   *   < > 604*   INR  --   --  1.3*   WBC 10.5   < > 15.3*    < > = values in this interval not displayed. Allergies: Erythromycin, Pcn [penicillins], Sulfa (sulfonamide antibiotics), and Cefepime  Education materials, including PICC Booklet, for PICC Care given to patient: yes. See Patient Education activity for further details. PROCEDURE DETAIL  A double lumen PICC line was started for antibiotic therapy. The following documentation is in addition to the PICC properties in the lines/airways flowsheet :  Lot #:[id number]  Was xylocaine 1% used intradermally:  yes  Total catheter length: 40 (cm)  External catheter length:  0 (cm)  Vein Selection for PICC:right basilic  Central Line Bundle followed yes  Complication Related to Insertion: none    The placement was verified by ECG/Sapiens technology: The  tip location is in the superior vena cava. See ECG results for PICC tip placement. Report given to CHRISTOPHER Devi. Line is okay to use.     Mal Angeles RN

## 2022-12-21 NOTE — PROGRESS NOTES
Bedside shift change report given to Vidya Gandara RN (oncoming nurse) by Ken Amin RN (offgoing nurse). Report included the following information SBAR, Kardex, Intake/Output, and MAR.

## 2022-12-21 NOTE — CONSULTS
Consult    Patient: Gabriella Will MRN: [id number]  SSN: [id number]    YOB: 1960  Age: 63 y.o. Sex: female      Subjective:      Gabriella Will is a 63 y.o. female who is being seen for sigmoid diverticulitis associated with multiple liver abscesses. She reports a longstanding history of intermittent LLQ abdominal pain and fevers. She did not seek medical attention during these \"flare-ups\" due to lack of health insurance. She was recently admitted on 12/16 for fever and liver abscesses and underwent IR drainage. Repeat CT shows that one abscess has slightly decreased in size. Colonoscopy on 12/9 showed diverticulosis with mucosal edema and luminal narrowing. Currently, she denies any abdominal pain. No nausea/vomiting. No fevers      Past Medical History:   Diagnosis Date    High cholesterol     Infectious disease     Other ill-defined conditions(799.89)     fluid build up in chest     Past Surgical History:   Procedure Laterality Date    COLONOSCOPY N/A 12/9/2022    COLONOSCOPY performed by Gonsalo Rodarte MD at [facility] ENDOSCOPY    HX BREAST BIOPSY        No family history on file.   Social History     Tobacco Use    Smoking status: Every Day     Packs/day: 0.50     Types: Cigarettes    Smokeless tobacco: Not on file   Substance Use Topics    Alcohol use: Yes     Comment: about 4-6 beers per week      Current Facility-Administered Medications   Medication Dose Route Frequency Provider Last Rate Last Admin    oxyCODONE-acetaminophen (PERCOCET) 5-325 mg per tablet 1 Tablet  1 Tablet Oral Q6H PRN Milligram, Austin Rea PA-C   1 Tablet at 12/20/22 2030    meropenem (MERREM) 1 g in 0.9% sodium chloride (MBP/ADV) 50 mL MBP  1 g IntraVENous Q8H Milligram, Austin Rea, PA-C 16.7 mL/hr at 12/21/22 1317 1 g at 12/21/22 1317    hydrOXYzine HCL (ATARAX) tablet 25 mg  25 mg Oral TID PRN Milligram, Austin Rea, PA-C        hydrocortisone (ALA-LUCÍA) 1 % lotion   Topical BID PRN Milligram, Austin Rea PA-C Given at 37/06/78 5144    folic acid (FOLVITE) tablet 1 mg  1 mg Oral DAILY Jose Mccallum MD   1 mg at 12/21/22 7936    melatonin tablet 3 mg  3 mg Oral QHS PRN Jose Mccallum MD   3 mg at 12/19/22 2152    naproxen (NAPROSYN) tablet 500 mg  500 mg Oral BID WITH MEALS Jose Mccallum MD   500 mg at 12/21/22 1754    hydroCHLOROthiazide (HYDRODIURIL) tablet 25 mg  25 mg Oral DAILY Jose Mccallum MD   25 mg at 12/21/22 2482    LORazepam (ATIVAN) tablet 0.5 mg  0.5 mg Oral DAILY PRN Jose Mccallum MD        sertraline (ZOLOFT) tablet 100 mg  100 mg Oral DAILY Jose Mccallum MD   100 mg at 12/21/22 7105    sodium chloride (NS) flush 5-40 mL  5-40 mL IntraVENous Q8H Jose Mccallum MD   10 mL at 12/21/22 1322    sodium chloride (NS) flush 5-40 mL  5-40 mL IntraVENous PRN Jose Mccallum MD        acetaminophen (TYLENOL) tablet 650 mg  650 mg Oral Q6H PRN Jose Mccallum MD   650 mg at 12/20/22 1235    Or    acetaminophen (TYLENOL) suppository 650 mg  650 mg Rectal Q6H PRN Jose Mccallum MD        ondansetron (ZOFRAN ODT) tablet 4 mg  4 mg Oral Q8H PRN Jose Mccallum MD   4 mg at 12/17/22 1859    Or    [facility] injection 4 mg  4 mg IntraVENous Q6H PRN Jose Mccallum MD   4 mg at 12/18/22 0124        Allergies   Allergen Reactions    Erythromycin Other (comments)     \"Chest Pain\"    Pcn [Penicillins] Rash    Sulfa (Sulfonamide Antibiotics) Rash    Cefepime Rash       Review of Systems:  A comprehensive review of systems was negative except for that written in the History of Present Illness. Objective:     Vitals:    12/20/22 2032 12/21/22 0214 12/21/22 0755 12/21/22 1444   BP: 112/76 119/69 108/73 108/75   Pulse: 81 79 90 93   Resp: 16 16 16 16   Temp: 98.3 °F (36.8 °C) 97.4 °F (36.3 °C) 98.2 °F (36.8 °C) 98.5 °F (36.9 °C)   SpO2: 95% 95% 94% 96%   Weight:       Height:            Physical Exam:  General:  Alert, cooperative, no distress, appears stated age.    Eyes:  Conjunctivae/corneas clear. PERRL, EOMs intact. Fundi benign   Ears:  Normal TMs and external ear canals both ears. Nose: Nares normal. Septum midline. Mucosa normal. No drainage or sinus tenderness. Mouth/Throat: Lips, mucosa, and tongue normal. Teeth and gums normal.   Neck: Supple, symmetrical, trachea midline, no adenopathy, thyroid: no enlargment/tenderness/nodules, no carotid bruit and no JVD. Back:   Symmetric, no curvature. ROM normal. No CVA tenderness. Lungs:   Clear to auscultation bilaterally. Heart:  Regular rate and rhythm, S1, S2 normal, no murmur, click, rub or gallop. Abdomen:   Soft, non-tender. Bowel sounds normal. No masses,  No organomegaly. Extremities: Extremities normal, atraumatic, no cyanosis or edema. Pulses: 2+ and symmetric all extremities. Skin: Skin color, texture, turgor normal. No rashes or lesions   Lymph nodes: Cervical, supraclavicular, and axillary nodes normal.   Neurologic: CNII-XII intact. Normal strength, sensation and reflexes throughout. Assessment:     Hospital Problems  Date Reviewed: 12/8/2022            Codes Class Noted POA    Liver abscess ICD-10-CM: K75.0  ICD-9-CM: 572.0  12/16/2022 Unknown           Plan:     I discussed the risks and benefits of sigmoid colectomy including but not limited to bleeding, infection, anastomotic leak, damage to surrounding structures, and need for a temporary ostomy. I would like to see her in my office about 2 weeks after discharge. I will plan on a follow up CT in a few weeks and sigmoid colectomy in about 6-8 weeks depending on how the CT looks. She can be discharged from my standpoint as she is tolerating a diet and has no pain or tenderness.     Signed By: Kris Hooks MD     December 21, 2022

## 2022-12-21 NOTE — PROGRESS NOTES
ID-cross coverage  D/w hospitalist earlier today. Patient is doing well. WBC down to 12.5  Drainage <10cc from drain. IR to remove drain. Cultures-NG  Recommend-repeat CT post drain placement  Plan from GI- regrading intramural mass/abscess  ? Colonoscopy  Antibiotic recommendations after repeat CT.

## 2022-12-21 NOTE — PROGRESS NOTES
[address] [facility]  Hospitalist Group                                                                                          Hospitalist Progress Note  [place], [place]  Answering service: [phone] or [phone] from in house phone        Date of Service:  2022  NAME:  Esperanza Eugene  :  1960  MRN:  [id number]      Admission Summary:   Esperanza Eugene is a 63 y.o. female with a pmhx dyslipidemia, and ETOH dependence who presents with liver abscess. She presented initially to the ED about 2 weeks ago with fevers, diarrhea, vomiting, and weight loss. Work-up revealed stable liver masses, and sigmoid diverticulitis. She underwent colonoscopy which was unremarkable, and had a liver biopsy performed. In the ED pt was febrile to 100.5, CT abd/pelvis demonstrated increase in size of several of the liver masses have increased in size. Started on cefepime and flagyl, consult placed to IR       Interval history / Subjective:      No acute interval events. Plan for PICC placement today, repeat CT abdomen/pelvis tomorrow to assess for possible drain removal. Awaiting final ID recs for discharge    Assessment & Plan:     Liver abscess   Leukocytosis   - CT abd/pelvis: Multiple thick walled low-attenuation hepatic masses with the largest measuring 10.4x8.2cm (previously 9.0 x 6.6 cm on ) Focal wall thickening in the sigmoid colon, with probable 2.2 cm intramural abscess  intramural abscess  - BCX: in process,    - ABX: Broadened to meropenem, will add Vanc if there is a need for MRSA coverage.  - Suspect patient will need a prolonged course of ABX, will order a PICC if necessary  - Consult placed to ID on (), will follow up their reccomendations  - IR consult placed ()  - : Paged on call IR, Dr. Luciana Ladd, regarding liver abscesses and possible need for urgent drainage.  IR to place drain(s) Monday  - NPO at midnight tonight   - : Spoke with IR on the phone today, strongly advocate for drainage of abscess today, they will call the RN for scheduling  - 12/20: S/P IR drain placement on 12/19. ID following for long term ABX, will need PICC at discharge  - 12/21: Repeat CT abd/pelvis tomorrow. Reach out to IR once complete for potential removal of drain. Awaiting final ID recs, appreciate their assistance     Depression and anxiety  - Continue home Zoloft  - PRN ativan     Hypertension  - Continue home hydrochlorothiazide    Past history of EtOH dependence  - Has not had any alcohol for several weeks since diagnosis  - Continue home folic acid    Skin Rash: improved  - Improved with cessation of cefepime  - Possibly a  reaction to cefepime as she has a documented allergy of rash to penicillins  - Pt not experiencing any symptoms of anaphylaxis at this time  - closely monitor the patient     Code status: Full  Prophylaxis: SCDs  Care Plan discussed with: patient, RN, CM, specialist  Anticipated Disposition: home with [place] likely 24h     Hospital Problems  Date Reviewed: 12/8/2022            Codes Class Noted POA    Liver abscess ICD-10-CM: K75.0  ICD-9-CM: 572.0  12/16/2022 Unknown       Review of Systems:   A comprehensive review of systems was negative except for that written in the HPI. Vital Signs:    Last 24hrs VS reviewed since prior progress note.  Most recent are:  Visit Vitals  /73 (BP 1 Location: Right upper arm, BP Patient Position: Semi fowlers)   Pulse 90   Temp 98.2 °F (36.8 °C)   Resp 16   Ht 5' 4\" (1.626 m)   Wt 71.2 kg (156 lb 15.5 oz)   SpO2 94%   BMI 26.94 kg/m²         Intake/Output Summary (Last 24 hours) at 12/21/2022 1303  Last data filed at 12/20/2022 2032  Gross per 24 hour   Intake 220 ml   Output 80 ml   Net 140 ml          Physical Examination:     I had a face to face encounter with this patient and independently examined them on 12/21/2022 as outlined below:          Constitutional:  No acute distress, cooperative, pleasant    ENT: Oral mucosa moist, oropharynx benign. Resp:  CTA bilaterally. No wheezing/rhonchi/rales. No accessory muscle use. CV:  Regular rhythm, normal rate, no murmurs, gallops, rubs    GI:  RUQ drain in place dressing C/D/I, serosanguinous output in drain, Tender to palpation in RUQ, no rebound or guarding, soft, non distended, hypoactive bowel sounds,     Musculoskeletal:  Rash present on abdomen, No edema, warm, 2+ pulses throughout    Neurologic:  Moves all extremities. AAOx3, CN II-XII intact, sensation intact            Data Review:    Review and/or order of clinical lab test  Review and/or order of tests in the radiology section of CPT  Review and/or order of tests in the medicine section of CPT      Labs:     Recent Labs     12/21/22  0508 12/20/22 0301   WBC 10.5 12.5*   HGB 10.9* 9.6*   HCT 35.1 30.1*   * 599*       Recent Labs     12/21/22  0508 12/20/22  0301 12/19/22 0229    136 135*   K 3.6 3.3* 2.8*   CL 97 98 94*   CO2 34* 33* 33*   BUN 9 12 8   CREA 0.51* 0.46* 0.46*   GLU 95 105* 97   CA 9.4 8.3* 8.6       Recent Labs     12/21/22  0508 12/20/22  0301 12/19/22 0229   ALT 15 15 16   AP 86 86 91   TBILI 0.2 0.3 0.5   TP 7.2 6.6 6.7   ALB 2.1* 1.8* 1.9*   GLOB 5.1* 4.8* 4.8*       Recent Labs     12/19/22 0229   INR 1.3*   PTP 13.0*        No results for input(s): FE, TIBC, PSAT, FERR in the last 72 hours. No results found for: FOL, RBCF   No results for input(s): PH, PCO2, PO2 in the last 72 hours. No results for input(s): CPK, CKNDX, TROIQ in the last 72 hours.     No lab exists for component: CPKMB  No results found for: CHOL, CHOLX, CHLST, CHOLV, HDL, HDLP, LDL, LDLC, DLDLP, TGLX, TRIGL, TRIGP, CHHD, CHHDX  No results found for: [facility]  Lab Results   Component Value Date/Time    Color YELLOW/STRAW 12/17/2022 01:11 AM    Appearance CLEAR 12/17/2022 01:11 AM    Specific gravity 1.022 12/17/2022 01:11 AM    pH (UA) 6.0 12/17/2022 01:11 AM    Protein Negative 12/17/2022 01:11 AM Glucose Negative 12/17/2022 01:11 AM    Ketone TRACE (A) 12/17/2022 01:11 AM    Bilirubin Negative 12/17/2022 01:11 AM    Urobilinogen 0.2 12/17/2022 01:11 AM    Nitrites Negative 12/17/2022 01:11 AM    Leukocyte Esterase Negative 12/17/2022 01:11 AM    Epithelial cells FEW 12/07/2022 01:14 AM    Bacteria 1+ (A) 12/07/2022 01:14 AM    WBC 0-4 12/07/2022 01:14 AM    RBC 5-10 12/07/2022 01:14 AM         Medications Reviewed:     Current Facility-Administered Medications   Medication Dose Route Frequency    oxyCODONE-acetaminophen (PERCOCET) 5-325 mg per tablet 1 Tablet  1 Tablet Oral Q6H PRN    meropenem (MERREM) 1 g in 0.9% sodium chloride (MBP/ADV) 50 mL MBP  1 g IntraVENous Q8H    hydrOXYzine HCL (ATARAX) tablet 25 mg  25 mg Oral TID PRN    hydrocortisone (ALA-LUCÍA) 1 % lotion   Topical BID PRN    folic acid (FOLVITE) tablet 1 mg  1 mg Oral DAILY    melatonin tablet 3 mg  3 mg Oral QHS PRN    naproxen (NAPROSYN) tablet 500 mg  500 mg Oral BID WITH MEALS    hydroCHLOROthiazide (HYDRODIURIL) tablet 25 mg  25 mg Oral DAILY    LORazepam (ATIVAN) tablet 0.5 mg  0.5 mg Oral DAILY PRN    sertraline (ZOLOFT) tablet 100 mg  100 mg Oral DAILY    sodium chloride (NS) flush 5-40 mL  5-40 mL IntraVENous Q8H    sodium chloride (NS) flush 5-40 mL  5-40 mL IntraVENous PRN    acetaminophen (TYLENOL) tablet 650 mg  650 mg Oral Q6H PRN    Or    acetaminophen (TYLENOL) suppository 650 mg  650 mg Rectal Q6H PRN    ondansetron (ZOFRAN ODT) tablet 4 mg  4 mg Oral Q8H PRN    Or    ondansetron (ZOFRAN) injection 4 mg  4 mg IntraVENous Q6H PRN     ______________________________________________________________________  EXPECTED LENGTH OF STAY: 5d 0h  ACTUAL LENGTH OF STAY:          5                 Angel WOOD MilligramLISA

## 2022-12-21 NOTE — PROGRESS NOTES
Bedside shift change report given to [facility], RN (oncoming nurse) by Aneesh beasley RN (offgoing nurse). Report included the following information SBAR, Kardex, Intake/Output, MAR, Accordion, and Recent Results.

## 2022-12-21 NOTE — CONSULTS
[address] [address]        GASTROENTEROLOGY CONSULTATION NOTE  Will Sherlyn Turcios  [phone] office  [phone] NP/PA in-hospital cell phone M-F until 4:30PM  After 5PM or on weekends, please call  for physician on call        NAME:  Viji Holley   :   1960   MRN:   [id number]       Referring Physician: Jayda Reina PA-C    Consult Date: 2022 1:37 PM     History of Present Illness:  Patient is a 63 y.o. who is seen in consultation at the request of Jayda Reina PA-C for sigmoid abscess. Patient has a past medical history significant for dyslipidemia and alcohol dependence. She was admitted to the hospital on 22 for fever, liver abscesses, and sigmoid abscess. Patient underwent IR drainage of liver abscess on 22. She reports pain at the drain site. No other abdominal pain. No nausea or vomiting. She reports having a few formed bowel movements today. No hematochezia or melena. Colonoscopy (22) by Dr. Joanne Reynoso for abnormal CT colon showed a small rectal polyp - removed; left-sided diverticulosis with edematous mucosa and luminal narrowing in the sigmoid colon, consistent with healing diverticulitis; no mass or lesion to explain liver findings. Pathology showed a hyperplastic polyp. I have reviewed the emergency room note, hospital admission note, notes by all other clinicians who have seen the patient during this hospitalization to date. I have reviewed the problem list and the reason for this hospitalization. I have reviewed the allergies and the medications the patient was taking at home prior to this hospitalization.     PMH:  Past Medical History:   Diagnosis Date    High cholesterol     Infectious disease     Other ill-defined conditions(799.89)     fluid build up in chest       PSH:  Past Surgical History:   Procedure Laterality Date    COLONOSCOPY N/A 2022    COLONOSCOPY performed by Montana Mora MD at [facility] ENDOSCOPY    HX BREAST BIOPSY         Allergies: Allergies   Allergen Reactions    Erythromycin Other (comments)     \"Chest Pain\"    Pcn [Penicillins] Rash    Sulfa (Sulfonamide Antibiotics) Rash    Cefepime Rash       Home Medications:  Prior to Admission Medications   Prescriptions Last Dose Informant Patient Reported? Taking? LORazepam (ATIVAN) 0.5 mg tablet   Yes Yes   Sig: Take 0.5 mg by mouth daily as needed for Anxiety. acetaminophen (TYLENOL) 325 mg tablet   No Yes   Sig: Take 2 Tablets by mouth every six (6) hours as needed for Pain or Fever for up to 30 days. ciprofloxacin HCl (CIPRO) 500 mg tablet 12/16/2022  Yes Yes   Sig: Take 500 mg by mouth two (2) times a day. folic acid (FOLVITE) 1 mg tablet 12/16/2022  No Yes   Sig: Take 1 Tablet by mouth daily for 30 days. hydrochlorothiazide (HYDRODIURIL) 25 mg tablet 12/9/2022  Yes Yes   Sig: Take 25 mg by mouth daily. melatonin 3 mg tablet   No Yes   Sig: Take 1 Tablet by mouth nightly as needed for Insomnia. metroNIDAZOLE (FLAGYL) 500 mg tablet 12/16/2022  Yes Yes   Sig: Take 500 mg by mouth three (3) times daily. naproxen (Naprosyn) 500 mg tablet   No Yes   Sig: Take 1 Tablet by mouth two (2) times daily (with meals) for 10 days. ondansetron hcl (Zofran) 4 mg tablet   No Yes   Sig: Take 1 Tablet by mouth every eight (8) hours as needed for Nausea. sertraline (ZOLOFT) 100 mg tablet 12/9/2022  Yes Yes   Sig: Take 100 mg by mouth daily.       Facility-Administered Medications: None       Hospital Medications:  Current Facility-Administered Medications   Medication Dose Route Frequency    oxyCODONE-acetaminophen (PERCOCET) 5-325 mg per tablet 1 Tablet  1 Tablet Oral Q6H PRN    meropenem (MERREM) 1 g in 0.9% sodium chloride (MBP/ADV) 50 mL MBP  1 g IntraVENous Q8H    hydrOXYzine HCL (ATARAX) tablet 25 mg  25 mg Oral TID PRN    hydrocortisone (ALA-LUCÍA) 1 % lotion   Topical BID PRN    folic acid (FOLVITE) tablet 1 mg 1 mg Oral DAILY    melatonin tablet 3 mg  3 mg Oral QHS PRN    naproxen (NAPROSYN) tablet 500 mg  500 mg Oral BID WITH MEALS    hydroCHLOROthiazide (HYDRODIURIL) tablet 25 mg  25 mg Oral DAILY    LORazepam (ATIVAN) tablet 0.5 mg  0.5 mg Oral DAILY PRN    sertraline (ZOLOFT) tablet 100 mg  100 mg Oral DAILY    sodium chloride (NS) flush 5-40 mL  5-40 mL IntraVENous Q8H    sodium chloride (NS) flush 5-40 mL  5-40 mL IntraVENous PRN    acetaminophen (TYLENOL) tablet 650 mg  650 mg Oral Q6H PRN    Or    acetaminophen (TYLENOL) suppository 650 mg  650 mg Rectal Q6H PRN    ondansetron (ZOFRAN ODT) tablet 4 mg  4 mg Oral Q8H PRN    Or    ondansetron (ZOFRAN) injection 4 mg  4 mg IntraVENous Q6H PRN       Social History:  Social History     Tobacco Use    Smoking status: Every Day     Packs/day: 0.50     Types: Cigarettes    Smokeless tobacco: Not on file   Substance Use Topics    Alcohol use: Yes     Comment: about 4-6 beers per week     Family History:  No family history on file. Review of Systems:  Constitutional: negative fever, negative chills, negative weight loss  Eyes:   negative visual changes  ENT:   negative sore throat, tongue or lip swelling  Respiratory:  negative cough, negative dyspnea  Cards:  negative for chest pain, palpitations, lower extremity edema  GI:   See HPI  :  negative for frequency, dysuria  Integument:  negative for rash and pruritus  Heme:  negative for easy bruising and gum/nose bleeding  Musculoskeletal:negative for myalgias, back pain and muscle weakness  Neuro:    negative for headaches, dizziness  Psych: negative for feelings of anxiety, depression     Objective:   Patient Vitals for the past 8 hrs:   BP Temp Pulse Resp SpO2   12/21/22 0755 108/73 98.2 °F (36.8 °C) 90 16 94 %     No intake/output data recorded.   12/19 1901 - 12/21 0700  In: 26 [P.O.:210]  Out: 150 [Drains:150]    EXAM:     CONST:  Pleasant female lying in bed, no acute distress   NEURO:  Alert and oriented x 3   HEENT: EOMI, no scleral icterus   LUNGS: No acute respiratory distress   ABD:  Soft, non distended, mild right sided tenderness at drain site, no rebound, no guarding. + Bowel sounds. EXT:  Warm   PSYCH: Full, not anxious or agitated     Data Review     Recent Labs     12/21/22  0508 12/20/22  0301   WBC 10.5 12.5*   HGB 10.9* 9.6*   HCT 35.1 30.1*   * 599*     Recent Labs     12/21/22  0508 12/20/22  0301    136   K 3.6 3.3*   CL 97 98   CO2 34* 33*   BUN 9 12   CREA 0.51* 0.46*   GLU 95 105*   CA 9.4 8.3*     Recent Labs     12/21/22  0508 12/20/22  0301   AP 86 86   TP 7.2 6.6   ALB 2.1* 1.8*   GLOB 5.1* 4.8*     Recent Labs     12/19/22  0229   INR 1.3*   PTP 13.0*     Colonoscopy (12/9/22) by Dr. Mireille Tripathi for abnormal CT colon showed a small rectal polyp - removed; left-sided diverticulosis with edematous mucosa and luminal narrowing in the sigmoid colon, consistent with healing diverticulitis; no mass or lesion to explain liver findings. Pathology showed a hyperplastic polyp. Assessment:     Sigmoid abscess: CT abdomen/pelvis with IV contrast (12/16/22): 3 largest liver masses seen on prior CT increased in size, findings compatible with liver abscesses; focal wall thickening in the sigmoid colon, compatible with acute diverticulitis, with probable small associated intramural abscess. CT abdomen/pelvis without contrast (12/18/22): multiple liver hypodensities again noted consistent with abscesses, not significantly changes; small intramural abscess in the sigmoid colon measuring 14 x 11 mm slightly decreased in size. Colonoscopy 12/9/22 as above. WBC trending down (10.5 today). Hepatic abscesses: status post IR drainage 12/19/22, drain in place  Hypertension  History of alcohol dependence  Anxiety and depression     Patient Active Problem List   Diagnosis Code    Influenza-like illness J11.1    Liver mass R16.0    Liver abscess K75.0     Plan:        On regular diet  Continue antibiotics per ID  Repeat CT planned  Colonoscopy 12/9/22 as above  Patient was discussed with Dr. Sadiq Franklin. Consult colorectal surgery, Dr. Maximiliano Parker.    Thank you for allowing me to participate in care of Mary Jane 62     Signed By: QUINN Soria     12/21/2022  1:37 PM     Agree with above  Will benefit from non urgent sigmoid colon resection, to follow CRS recommendations    Will follow as needed

## 2022-12-22 ENCOUNTER — APPOINTMENT (OUTPATIENT)
Dept: CT IMAGING | Age: 62
End: 2022-12-22
Attending: NURSE PRACTITIONER
Payer: COMMERCIAL

## 2022-12-22 LAB
ALBUMIN SERPL-MCNC: 2.1 G/DL (ref 3.5–5)
ALBUMIN/GLOB SERPL: 0.4 {RATIO} (ref 1.1–2.2)
ALP SERPL-CCNC: 86 U/L (ref 45–117)
ALT SERPL-CCNC: 19 U/L (ref 12–78)
ANION GAP SERPL CALC-SCNC: 4 MMOL/L (ref 5–15)
AST SERPL-CCNC: 18 U/L (ref 15–37)
BACTERIA SPEC CULT: NORMAL
BACTERIA SPEC CULT: NORMAL
BASOPHILS # BLD: 0 K/UL (ref 0–0.1)
BASOPHILS NFR BLD: 0 % (ref 0–1)
BILIRUB SERPL-MCNC: 0.2 MG/DL (ref 0.2–1)
BUN SERPL-MCNC: 11 MG/DL (ref 6–20)
BUN/CREAT SERPL: 23 (ref 12–20)
CALCIUM SERPL-MCNC: 9.3 MG/DL (ref 8.5–10.1)
CHLORIDE SERPL-SCNC: 99 MMOL/L (ref 97–108)
CO2 SERPL-SCNC: 34 MMOL/L (ref 21–32)
CREAT SERPL-MCNC: 0.48 MG/DL (ref 0.55–1.02)
DIFFERENTIAL METHOD BLD: ABNORMAL
EOSINOPHIL # BLD: 0.3 K/UL (ref 0–0.4)
EOSINOPHIL NFR BLD: 3 % (ref 0–7)
ERYTHROCYTE [DISTWIDTH] IN BLOOD BY AUTOMATED COUNT: 13.5 % (ref 11.5–14.5)
GLOBULIN SER CALC-MCNC: 5.1 G/DL (ref 2–4)
GLUCOSE SERPL-MCNC: 109 MG/DL (ref 65–100)
HCT VFR BLD AUTO: 35.7 % (ref 35–47)
HGB BLD-MCNC: 11.2 G/DL (ref 11.5–16)
IMM GRANULOCYTES # BLD AUTO: 0.1 K/UL (ref 0–0.04)
IMM GRANULOCYTES NFR BLD AUTO: 1 % (ref 0–0.5)
LYMPHOCYTES # BLD: 1.5 K/UL (ref 0.8–3.5)
LYMPHOCYTES NFR BLD: 17 % (ref 12–49)
MCH RBC QN AUTO: 30.2 PG (ref 26–34)
MCHC RBC AUTO-ENTMCNC: 31.4 G/DL (ref 30–36.5)
MCV RBC AUTO: 96.2 FL (ref 80–99)
MONOCYTES # BLD: 0.5 K/UL (ref 0–1)
MONOCYTES NFR BLD: 6 % (ref 5–13)
NEUTS SEG # BLD: 6.6 K/UL (ref 1.8–8)
NEUTS SEG NFR BLD: 73 % (ref 32–75)
NRBC # BLD: 0 K/UL (ref 0–0.01)
NRBC BLD-RTO: 0 PER 100 WBC
PLATELET # BLD AUTO: 604 K/UL (ref 150–400)
PMV BLD AUTO: 9.8 FL (ref 8.9–12.9)
POTASSIUM SERPL-SCNC: 3.6 MMOL/L (ref 3.5–5.1)
PROT SERPL-MCNC: 7.2 G/DL (ref 6.4–8.2)
RBC # BLD AUTO: 3.71 M/UL (ref 3.8–5.2)
RBC MORPH BLD: ABNORMAL
SERVICE CMNT-IMP: NORMAL
SERVICE CMNT-IMP: NORMAL
SODIUM SERPL-SCNC: 137 MMOL/L (ref 136–145)
WBC # BLD AUTO: 9 K/UL (ref 3.6–11)

## 2022-12-22 PROCEDURE — P9047 ALBUMIN (HUMAN), 25%, 50ML: HCPCS | Performed by: FAMILY MEDICINE

## 2022-12-22 PROCEDURE — 65270000029 HC RM PRIVATE

## 2022-12-22 PROCEDURE — 74011000636 HC RX REV CODE- 636: Performed by: RADIOLOGY

## 2022-12-22 PROCEDURE — 74011000258 HC RX REV CODE- 258: Performed by: PHYSICIAN ASSISTANT

## 2022-12-22 PROCEDURE — 74011250637 HC RX REV CODE- 250/637: Performed by: FAMILY MEDICINE

## 2022-12-22 PROCEDURE — 36415 COLL VENOUS BLD VENIPUNCTURE: CPT

## 2022-12-22 PROCEDURE — 80053 COMPREHEN METABOLIC PANEL: CPT

## 2022-12-22 PROCEDURE — 74011000250 HC RX REV CODE- 250: Performed by: FAMILY MEDICINE

## 2022-12-22 PROCEDURE — 74011250636 HC RX REV CODE- 250/636: Performed by: FAMILY MEDICINE

## 2022-12-22 PROCEDURE — 74177 CT ABD & PELVIS W/CONTRAST: CPT

## 2022-12-22 PROCEDURE — 74011250636 HC RX REV CODE- 250/636: Performed by: PHYSICIAN ASSISTANT

## 2022-12-22 PROCEDURE — 85025 COMPLETE CBC W/AUTO DIFF WBC: CPT

## 2022-12-22 RX ORDER — ALPRAZOLAM 0.25 MG/1
0.25 TABLET ORAL
Status: DISCONTINUED | OUTPATIENT
Start: 2022-12-22 | End: 2022-12-23 | Stop reason: HOSPADM

## 2022-12-22 RX ORDER — ALBUMIN HUMAN 250 G/1000ML
25 SOLUTION INTRAVENOUS EVERY 6 HOURS
Status: COMPLETED | OUTPATIENT
Start: 2022-12-22 | End: 2022-12-23

## 2022-12-22 RX ADMIN — HYDROCHLOROTHIAZIDE 25 MG: 25 TABLET ORAL at 10:26

## 2022-12-22 RX ADMIN — MEROPENEM 1 G: 1 INJECTION, POWDER, FOR SOLUTION INTRAVENOUS at 12:47

## 2022-12-22 RX ADMIN — SERTRALINE 100 MG: 50 TABLET, FILM COATED ORAL at 10:26

## 2022-12-22 RX ADMIN — NAPROXEN 500 MG: 250 TABLET ORAL at 17:04

## 2022-12-22 RX ADMIN — MEROPENEM 1 G: 1 INJECTION, POWDER, FOR SOLUTION INTRAVENOUS at 05:29

## 2022-12-22 RX ADMIN — SODIUM CHLORIDE, PRESERVATIVE FREE 10 ML: 5 INJECTION INTRAVENOUS at 17:04

## 2022-12-22 RX ADMIN — FOLIC ACID 1 MG: 1 TABLET ORAL at 10:26

## 2022-12-22 RX ADMIN — SODIUM CHLORIDE, PRESERVATIVE FREE 10 ML: 5 INJECTION INTRAVENOUS at 05:29

## 2022-12-22 RX ADMIN — NAPROXEN 500 MG: 250 TABLET ORAL at 10:27

## 2022-12-22 RX ADMIN — ALBUMIN (HUMAN) 25 G: 0.25 INJECTION, SOLUTION INTRAVENOUS at 19:58

## 2022-12-22 RX ADMIN — IOPAMIDOL 100 ML: 755 INJECTION, SOLUTION INTRAVENOUS at 15:33

## 2022-12-22 NOTE — PROGRESS NOTES
[facility] Adult  [facility]                                                                                          Hospitalist Progress Note  Neptali Muniz NP  Answering service: [phone] OR [phone] from in house phone        Date of Service:  2022  NAME:  Esperanza Eugene  :  1960  MRN:  [id number]      Admission Summary:   Esperanza Eugene is a 63 y.o. female with a pmhx dyslipidemia, and ETOH dependence who presents with liver abscess. She presented initially to the ED about 2 weeks ago with fevers, diarrhea, vomiting, and weight loss. Work-up revealed stable liver masses, and sigmoid diverticulitis. She underwent colonoscopy which was unremarkable, and had a liver biopsy performed       Interval history / Subjective: Follow-up for issues listed below.   -Patient seen and examined, no c/o's. Assessment & Plan:     Sepsis 2/ to Hepatic Abscess: febrile to 100.5, tachycardia 120, WBC 19.8  - CT AP with : Interval decrease in size of dominant right hepatic lobe abscess, post  placement of pigtail drainage catheter. Other abscesses are not significantly  changed. Mild interval decrease in size of small intramural abscess in the sigmoid  colon. No new abnormalities. - CT AP with : The 3 largest liver masses seen on prior CT are increased in size, but most of the smaller lesions are less conspicuous. Findings are compatible with liver  abscesses. Focal wall thickening in the sigmoid colon, compatible with acute diverticulitis, with probable small associated intramural abscess. - repeat CT CP with :   - BCNGTD  - IR:  US guided hepatic abscess drain placement. - IV ABT- merrem  - lactic 0.8  - ID following: Antibiotic recommendations after repeat CT, PICC placed  - Colorectal Sx: follow up 2 weeks after discharge-CT in a few weeks and sigmoid colectomy in about 6-8 weeks depending on CT. Dr. Cameron Lind.   - GI following    Depression/Anxiety: continue home meds, prn ativan. HTN: continue home regimen. Hx ETOH Abuse: folic acid, reported cessation. Rash: 2/2 to cefepime, improved with cessation. Code status:   Prophylaxis:   Care Plan discussed with:   Anticipated Disposition:      Hospital Problems  Date Reviewed: 12/8/2022            Codes Class Noted POA    Liver abscess ICD-10-CM: K75.0  ICD-9-CM: 572.0  12/16/2022 Unknown             Review of Systems:   A comprehensive review of systems was negative except for that written in the HPI. Vital Signs:    Last 24hrs VS reviewed since prior progress note. Most recent are:  Visit Vitals  /84 (BP 1 Location: Left lower arm, BP Patient Position: Sitting)   Pulse 86   Temp 98.9 °F (37.2 °C)   Resp 18   Ht 5' 4\" (1.626 m)   Wt 71.2 kg (156 lb 15.5 oz)   SpO2 96%   BMI 26.94 kg/m²         Intake/Output Summary (Last 24 hours) at 12/22/2022 1415  Last data filed at 12/22/2022 0016  Gross per 24 hour   Intake 510 ml   Output 40 ml   Net 470 ml        Physical Examination:     I had a face to face encounter with this patient and independently examined them on 12/22/2022 as outlined below:  Constitutional:  No acute distress, cooperative, pleasant    ENT:  Oral mucosa moist.    Resp:  CTA bilaterally. No wheezing/rhonchi/rales. No accessory muscle use. CV:  Regular rhythm, normal rate, S1,S2.    GI/:  Soft, slightly distended, + tender, no guarding, BS present. Voids Freely. RUQ drain. Musculoskeletal:  No edema, warm. Neurologic:  Moves all extremities. AAOx3. Skin:  w/d. Psych:  Good insight, Not anxious nor agitated.              Data Review:    Review and/or order of clinical lab test      Labs:     Recent Labs     12/22/22  0522 12/21/22  0508   WBC 9.0 10.5   HGB 11.2* 10.9*   HCT 35.7 35.1   * 656*     Recent Labs     12/22/22  0522 12/21/22  0508 12/20/22  0301    136 136   K 3.6 3.6 3.3*   CL 99 97 98   CO2 34* 34* 33*   BUN 11 9 12   CREA 0.48* 0.51* 0.46* * 95 105*   CA 9.3 9.4 8.3*     Recent Labs     12/22/22  0522 12/21/22  0508 12/20/22  0301   ALT 19 15 15   AP 86 86 86   TBILI 0.2 0.2 0.3   TP 7.2 7.2 6.6   ALB 2.1* 2.1* 1.8*   GLOB 5.1* 5.1* 4.8*     No results for input(s): INR, PTP, APTT, INREXT in the last 72 hours. No results for input(s): FE, TIBC, PSAT, FERR in the last 72 hours. No results found for: FOL, RBCF   No results for input(s): PH, PCO2, PO2 in the last 72 hours. No results for input(s): CPK, CKNDX, TROIQ in the last 72 hours.     No lab exists for component: CPKMB  No results found for: CHOL, CHOLX, CHLST, CHOLV, HDL, HDLP, LDL, LDLC, DLDLP, TGLX, TRIGL, TRIGP, CHHD, CHHDX  No results found for: [facility]  Lab Results   Component Value Date/Time    Color YELLOW/STRAW 12/17/2022 01:11 AM    Appearance CLEAR 12/17/2022 01:11 AM    Specific gravity 1.022 12/17/2022 01:11 AM    pH (UA) 6.0 12/17/2022 01:11 AM    Protein Negative 12/17/2022 01:11 AM    Glucose Negative 12/17/2022 01:11 AM    Ketone TRACE (A) 12/17/2022 01:11 AM    Bilirubin Negative 12/17/2022 01:11 AM    Urobilinogen 0.2 12/17/2022 01:11 AM    Nitrites Negative 12/17/2022 01:11 AM    Leukocyte Esterase Negative 12/17/2022 01:11 AM    Epithelial cells FEW 12/07/2022 01:14 AM    Bacteria 1+ (A) 12/07/2022 01:14 AM    WBC 0-4 12/07/2022 01:14 AM    RBC 5-10 12/07/2022 01:14 AM         Medications Reviewed:     Current Facility-Administered Medications   Medication Dose Route Frequency    oxyCODONE-acetaminophen (PERCOCET) 5-325 mg per tablet 1 Tablet  1 Tablet Oral Q6H PRN    meropenem (MERREM) 1 g in 0.9% sodium chloride (MBP/ADV) 50 mL MBP  1 g IntraVENous Q8H    hydrOXYzine HCL (ATARAX) tablet 25 mg  25 mg Oral TID PRN    hydrocortisone (ALA-LUCÍA) 1 % lotion   Topical BID PRN    folic acid (FOLVITE) tablet 1 mg  1 mg Oral DAILY    melatonin tablet 3 mg  3 mg Oral QHS PRN    naproxen (NAPROSYN) tablet 500 mg  500 mg Oral BID WITH MEALS    hydroCHLOROthiazide (HYDRODIURIL) tablet 25 mg  25 mg Oral DAILY    LORazepam (ATIVAN) tablet 0.5 mg  0.5 mg Oral DAILY PRN    sertraline (ZOLOFT) tablet 100 mg  100 mg Oral DAILY    sodium chloride (NS) flush 5-40 mL  5-40 mL IntraVENous Q8H    sodium chloride (NS) flush 5-40 mL  5-40 mL IntraVENous PRN    acetaminophen (TYLENOL) tablet 650 mg  650 mg Oral Q6H PRN    Or    acetaminophen (TYLENOL) suppository 650 mg  650 mg Rectal Q6H PRN    ondansetron (ZOFRAN ODT) tablet 4 mg  4 mg Oral Q8H PRN    Or    ondansetron (ZOFRAN) injection 4 mg  4 mg IntraVENous Q6H PRN     ______________________________________________________________________  EXPECTED LENGTH OF STAY: 5d 0h  ACTUAL LENGTH OF STAY:          6                 Neela Devi NP

## 2022-12-23 ENCOUNTER — TELEPHONE (OUTPATIENT)
Dept: INFECTIOUS DISEASES | Age: 62
End: 2022-12-23

## 2022-12-23 VITALS
SYSTOLIC BLOOD PRESSURE: 99 MMHG | WEIGHT: 156.97 LBS | HEART RATE: 91 BPM | HEIGHT: 64 IN | DIASTOLIC BLOOD PRESSURE: 63 MMHG | BODY MASS INDEX: 26.8 KG/M2 | OXYGEN SATURATION: 95 % | TEMPERATURE: 98.1 F | RESPIRATION RATE: 16 BRPM

## 2022-12-23 LAB
ALBUMIN SERPL-MCNC: 2.5 G/DL (ref 3.5–5)
ALBUMIN/GLOB SERPL: 0.6 {RATIO} (ref 1.1–2.2)
ALP SERPL-CCNC: 76 U/L (ref 45–117)
ALT SERPL-CCNC: 27 U/L (ref 12–78)
ANION GAP SERPL CALC-SCNC: 5 MMOL/L (ref 5–15)
AST SERPL-CCNC: 29 U/L (ref 15–37)
BACTERIA SPEC CULT: NORMAL
BACTERIA SPEC CULT: NORMAL
BASOPHILS # BLD: 0.1 K/UL (ref 0–0.1)
BASOPHILS NFR BLD: 1 % (ref 0–1)
BILIRUB SERPL-MCNC: 0.2 MG/DL (ref 0.2–1)
BUN SERPL-MCNC: 13 MG/DL (ref 6–20)
BUN/CREAT SERPL: 28 (ref 12–20)
CALCIUM SERPL-MCNC: 9.2 MG/DL (ref 8.5–10.1)
CHLORIDE SERPL-SCNC: 100 MMOL/L (ref 97–108)
CO2 SERPL-SCNC: 32 MMOL/L (ref 21–32)
CREAT SERPL-MCNC: 0.47 MG/DL (ref 0.55–1.02)
DIFFERENTIAL METHOD BLD: ABNORMAL
EOSINOPHIL # BLD: 0.4 K/UL (ref 0–0.4)
EOSINOPHIL NFR BLD: 4 % (ref 0–7)
ERYTHROCYTE [DISTWIDTH] IN BLOOD BY AUTOMATED COUNT: 13.5 % (ref 11.5–14.5)
GLOBULIN SER CALC-MCNC: 4.4 G/DL (ref 2–4)
GLUCOSE SERPL-MCNC: 95 MG/DL (ref 65–100)
GRAM STN SPEC: NORMAL
GRAM STN SPEC: NORMAL
HCT VFR BLD AUTO: 29.5 % (ref 35–47)
HGB BLD-MCNC: 9.1 G/DL (ref 11.5–16)
IMM GRANULOCYTES # BLD AUTO: 0 K/UL (ref 0–0.04)
IMM GRANULOCYTES NFR BLD AUTO: 0 % (ref 0–0.5)
LYMPHOCYTES # BLD: 1.7 K/UL (ref 0.8–3.5)
LYMPHOCYTES NFR BLD: 19 % (ref 12–49)
MCH RBC QN AUTO: 30.2 PG (ref 26–34)
MCHC RBC AUTO-ENTMCNC: 30.8 G/DL (ref 30–36.5)
MCV RBC AUTO: 98 FL (ref 80–99)
MONOCYTES # BLD: 0.6 K/UL (ref 0–1)
MONOCYTES NFR BLD: 7 % (ref 5–13)
NEUTS SEG # BLD: 6.2 K/UL (ref 1.8–8)
NEUTS SEG NFR BLD: 69 % (ref 32–75)
NRBC # BLD: 0 K/UL (ref 0–0.01)
NRBC BLD-RTO: 0 PER 100 WBC
PLATELET # BLD AUTO: 632 K/UL (ref 150–400)
PMV BLD AUTO: 9.9 FL (ref 8.9–12.9)
POTASSIUM SERPL-SCNC: 3.6 MMOL/L (ref 3.5–5.1)
PROT SERPL-MCNC: 6.9 G/DL (ref 6.4–8.2)
RBC # BLD AUTO: 3.01 M/UL (ref 3.8–5.2)
RBC MORPH BLD: ABNORMAL
SERVICE CMNT-IMP: NORMAL
SERVICE CMNT-IMP: NORMAL
SODIUM SERPL-SCNC: 137 MMOL/L (ref 136–145)
WBC # BLD AUTO: 9 K/UL (ref 3.6–11)

## 2022-12-23 PROCEDURE — 74011250636 HC RX REV CODE- 250/636: Performed by: FAMILY MEDICINE

## 2022-12-23 PROCEDURE — 36415 COLL VENOUS BLD VENIPUNCTURE: CPT

## 2022-12-23 PROCEDURE — 74011250636 HC RX REV CODE- 250/636: Performed by: PHYSICIAN ASSISTANT

## 2022-12-23 PROCEDURE — 74011000250 HC RX REV CODE- 250: Performed by: FAMILY MEDICINE

## 2022-12-23 PROCEDURE — 74011250637 HC RX REV CODE- 250/637: Performed by: FAMILY MEDICINE

## 2022-12-23 PROCEDURE — P9047 ALBUMIN (HUMAN), 25%, 50ML: HCPCS | Performed by: FAMILY MEDICINE

## 2022-12-23 PROCEDURE — 85025 COMPLETE CBC W/AUTO DIFF WBC: CPT

## 2022-12-23 PROCEDURE — 74011000258 HC RX REV CODE- 258: Performed by: PHYSICIAN ASSISTANT

## 2022-12-23 PROCEDURE — 80053 COMPREHEN METABOLIC PANEL: CPT

## 2022-12-23 RX ORDER — HYDROCORTISONE 10 MG/ML
LOTION TOPICAL
Qty: 114 G | Refills: 0 | Status: SHIPPED | OUTPATIENT
Start: 2022-12-23

## 2022-12-23 RX ORDER — UREA 10 %
2 LOTION (ML) TOPICAL 2 TIMES DAILY
Qty: 60 TABLET | Refills: 0 | Status: SHIPPED | OUTPATIENT
Start: 2022-12-23

## 2022-12-23 RX ORDER — HYDROCORTISONE 10 MG/ML
LOTION TOPICAL
Qty: 114 G | Refills: 0 | Status: SHIPPED | OUTPATIENT
Start: 2022-12-23 | End: 2022-12-23 | Stop reason: SDUPTHER

## 2022-12-23 RX ORDER — OXYCODONE AND ACETAMINOPHEN 5; 325 MG/1; MG/1
1 TABLET ORAL
Qty: 10 TABLET | Refills: 0 | Status: SHIPPED | OUTPATIENT
Start: 2022-12-23 | End: 2022-12-26

## 2022-12-23 RX ORDER — HYDROXYZINE 25 MG/1
25 TABLET, FILM COATED ORAL
Qty: 10 TABLET | Refills: 0 | Status: SHIPPED | OUTPATIENT
Start: 2022-12-23

## 2022-12-23 RX ADMIN — MEROPENEM 1 G: 1 INJECTION, POWDER, FOR SOLUTION INTRAVENOUS at 08:13

## 2022-12-23 RX ADMIN — NAPROXEN 500 MG: 250 TABLET ORAL at 08:13

## 2022-12-23 RX ADMIN — SODIUM CHLORIDE, PRESERVATIVE FREE 10 ML: 5 INJECTION INTRAVENOUS at 08:54

## 2022-12-23 RX ADMIN — SERTRALINE 100 MG: 50 TABLET, FILM COATED ORAL at 08:53

## 2022-12-23 RX ADMIN — FOLIC ACID 1 MG: 1 TABLET ORAL at 08:52

## 2022-12-23 RX ADMIN — ALBUMIN (HUMAN) 25 G: 0.25 INJECTION, SOLUTION INTRAVENOUS at 04:32

## 2022-12-23 RX ADMIN — HYDROCHLOROTHIAZIDE 25 MG: 25 TABLET ORAL at 08:52

## 2022-12-23 RX ADMIN — MEROPENEM 1 G: 1 INJECTION, POWDER, FOR SOLUTION INTRAVENOUS at 00:39

## 2022-12-23 NOTE — PROGRESS NOTES
Transition Of Care: The patient is discharging home today with [employer] for IV antibiotics. Final orders are written. CM could not secure home health due to difficult insurance. Aleyda Banks is cathleen with an agency to assist with the nursing. Picc teaching to be completed by Aleyda Banks prior to discharge. Family to transport. RUR: 13%    CM following for discharge needs.     Deyanira Serrano RN/CRM

## 2022-12-23 NOTE — DISCHARGE SUMMARY
Discharge Summary       PATIENT ID: Melba Royal  MRN: [id number]   YOB: 1960    DATE OF ADMISSION: 12/16/2022  3:10 PM    DATE OF DISCHARGE: 12/23/2022     PRIMARY CARE PROVIDER: Margareth Klein MD     ATTENDING PHYSICIAN: Josh Wilkes MD  DISCHARGING PROVIDER: Liz Shea NP    To contact this individual call [phone] and ask the  to page. If unavailable ask to be transferred the Adult Hospitalist Department. CONSULTATIONS: IP CONSULT TO INTERVENTIONAL RADIOLOGY  IP CONSULT TO INFECTIOUS DISEASES  IP CONSULT TO GASTROENTEROLOGY  IP CONSULT TO INTERVENTIONAL RADIOLOGY  IP CONSULT TO COLORECTAL SURGERY    PROCEDURES/SURGERIES: * No surgery found *    DISCHARGE DIAGNOSES:     Sepsis 2/2 to Hepatic Abscess, Depression/Axiety, HTN, Hx ETOH Dependence, Rash-improved    ADMISSION SUMMARY AND HOSPITAL COURSE:   Melba Royal is a 63 y.o. female with a pmhx dyslipidemia, and ETOH dependence who presents with liver abscess. She presented initially to the ED about 2 weeks ago with fevers, diarrhea, vomiting, and weight loss. Work-up revealed stable liver masses, and sigmoid diverticulitis. She underwent colonoscopy which was unremarkable, and had a liver biopsy performed. DISCHARGE DIAGNOSES / PLAN:      Sepsis 2/2 to Hepatic Abscess: febrile to 100.5, tachycardia 120, WBC 19.8  - CT AP with 12/16: Interval decrease in size of dominant right hepatic lobe abscess, post placement of pigtail drainage catheter. Other abscesses are not significantly  changed. Mild interval decrease in size of small intramural abscess in the sigmoid colon. No new abnormalities. - CT AP with 12/21: The 3 largest liver masses seen on prior CT are increased in size, but most of the smaller lesions are less conspicuous. Findings are compatible with liver  abscesses. Focal wall thickening in the sigmoid colon, compatible with acute diverticulitis, with probable small associated intramural abscess.   - repeat CT CP with 12/22: There is aortic valvular calcification and correlation for aortic stenosis would be helpful. The ascending aorta is ectatic measuring 4.4 cm. The dominant liver abscess which contains a drain is slightly smaller. There is persistent fluid in the cavity which is also slightly diminished. Other hepatic abscesses are stable. Slight wall thickening in the sigmoid colon with small intramural abscesses appear stable. - BCNGTD, Body fluid- NGTD  - IR: 12/19 US guided hepatic abscess drain placement. - IR to remove drain 12/23  - IV ABT- merrem 12/18-23, Flagyl and Cefepime 12/16-12/18.  - lactic 0.8  - ID following:   -Ertapenem 1 g  IV daily  end date 1/27/2023  -Pull PICC -after ID follow-up  Patient will require repeat imaging prior to conclusion of antibiotic therapy.  -Weekly CBC, CMP & ESR/CRP every other week-  -Fax reports to [phone], call with critical labs  at [phone]  [facility] management per IR-please call them   with all questions, concerns  -Encourage adequate fluids, daily probiotic/yogurt  -If PICC malfunction occurs and home health cannot reposition please send patient to ED immediately. -ID follow-up -1/12 at 1130-in person or virtual.  - Colorectal Sx: follow up 2 weeks after discharge- CT in a few weeks and sigmoid colectomy in about 6-8 weeks depending on CT. Dr. Eris Phillips. - GI following     Depression/Anxiety: continue home meds, prn ativan. HTN: continue home regimen. Hx ETOH Abuse: folic acid, reported cessation. Rash: 2/2 to cefepime, improved with cessation. BMI: Body mass index is 26.94 kg/m². . This patient: Meets criteria for overweight given BMI >/= 25 and < 30 due to excess calories/nutritional. Weight loss and lifestyle modifications should be encouraged as an outpatient. PENDING TEST RESULTS:   At the time of discharge the following test results are still pending: none.      ADDITIONAL CARE RECOMMENDATIONS:   You have been deemed stable for discharge and are being discharged home with family. You have prescribed antibiotics, please complete entire course as ordered.     -Ertapenem 1 g  IV daily  end date 1/27/2023  -Pull PICC -after ID follow-up  Patient will require repeat imaging prior to conclusion of antibiotic therapy.  -Weekly CBC, CMP & ESR/CRP every other week-  -Fax reports to [phone], call with critical labs  at [phone]  [facility] management per IR-please call them   with all questions, concerns  -Encourage adequate fluids, daily probiotic/yogurt  -If PICC malfunction occurs and home health cannot reposition please send patient to ED immediately. -ID follow-up -1/12 at 1130-in person or virtual.    Should you need medication refills beyond what we have prescribed for you, you will need to contact your primary care provider for refills. Return to the ER or notify your primary care office if you have a fever greater than 101.5 associated with chills, chest pain, or signs and symptoms of a stroke which are:  B E F A S T  -loss of balance, headaches or dizziness  -eyes blurred vision (sudden)  -asymmetrical facial symmetry (side of face droops)  -arms and leg weakness  -slurred speech / difficulty speaking  -if you experience any of these (time to call for an ambulance)     DIET: Regular Diet    ACTIVITY: Activity as tolerated and no driving for today    PICC/IV line CARE:   To help prevent infection, take a shower instead of a bath. Do not go swimming with the catheter. Try to keep the area dry. When you shower, cover the area with waterproof material, such as plastic wrap. Never touch the open end of the catheter if the cap is off      Hepatic Abscess Drain CARE: Flush drain twice daily as instructed, record output daily and bring to follow up CT scan and Interventional Radiology appointment. NOTIFY YOUR PHYSICIAN FOR ANY OF THE FOLLOWING:   Fever over 101 degrees for 24 hours.    Chest pain, shortness of breath, fever, chills, nausea, vomiting, diarrhea, change in mentation, falling, weakness, bleeding. Severe pain or pain not relieved by medications, as well as any other signs or symptoms that you may have questions about. FOLLOW UP APPOINTMENTS:    Follow-up Information       Follow up With Specialties Details Why Contact Info    Myah Hurtado MD Colon and Rectal Surgery Schedule an appointment as soon as possible for a visit in 2 week(s)  [address]  [facility] [address]  [phone]      Clay Hammer MD Family Medicine Follow up in 3 day(s) hospital discharge appointment [address] [facility]      Milind Garcia MD Gastroenterology Schedule an appointment as soon as possible for a visit in 1 month(s)  [phone] [facility]      Yovani Moraes MD Infectious Disease Physician, Internal Medicine Physician Follow up on 1/12/2023 11:30am, in person or virtual [facility]  [phone]                 DIET: Regular Diet    ACTIVITY: Activity as tolerated and no driving for today    PICC/IV line CARE:   To help prevent infection, take a shower instead of a bath. Do not go swimming with the catheter. Try to keep the area dry. When you shower, cover the area with waterproof material, such as plastic wrap. Never touch the open end of the catheter if the cap is off        DISCHARGE MEDICATIONS:  Current Discharge Medication List        START taking these medications    Details   oxyCODONE-acetaminophen (PERCOCET) 5-325 mg per tablet Take 1 Tablet by mouth every eight (8) hours as needed for Pain for up to 3 days. Max Daily Amount: 3 Tablets. Qty: 10 Tablet, Refills: 0  Start date: 12/23/2022, End date: 12/26/2022    Associated Diagnoses: Liver abscess      hydrOXYzine HCL (ATARAX) 25 mg tablet Take 1 Tablet by mouth every twelve (12) hours as needed for Itching or Anxiety.   Qty: 10 Tablet, Refills: 0  Start date: 12/23/2022      hydrocortisone (ALA-LUCÍA) 1 % lotion Apply  to affected area two (2) times daily as needed for PRN Reason (Other) (itching, rash). use thin layerAPPLY TO back, abdomen as needed    Nursing, document site in comments  Indications: inflammation of the skin due to an allergy, itching  Qty: 114 g, Refills: 0  Start date: 12/23/2022      ertapenem 1 gram 1 g IVPB 1 g by IntraVENous route every twenty-four (24) hours for 35 days. Qty: 35 Dose, Refills: 0  Start date: 12/23/2022, End date: 1/27/2023      Lactobacillus [facility]) 1 million cell tab tablet Take 2 Tablets by mouth two (2) times a day. Qty: 60 Tablet, Refills: 0  Start date: 12/23/2022           CONTINUE these medications which have NOT CHANGED    Details   LORazepam (ATIVAN) 0.5 mg tablet Take 0.5 mg by mouth daily as needed for Anxiety. sertraline (ZOLOFT) 100 mg tablet Take 100 mg by mouth daily. acetaminophen (TYLENOL) 325 mg tablet Take 2 Tablets by mouth every six (6) hours as needed for Pain or Fever for up to 30 days. Qty: 120 Tablet, Refills: 0      folic acid (FOLVITE) 1 mg tablet Take 1 Tablet by mouth daily for 30 days. Qty: 30 Tablet, Refills: 0      melatonin 3 mg tablet Take 1 Tablet by mouth nightly as needed for Insomnia. Qty: 30 Tablet, Refills: 0      ondansetron hcl (Zofran) 4 mg tablet Take 1 Tablet by mouth every eight (8) hours as needed for Nausea. Qty: 20 Tablet, Refills: 0      hydrochlorothiazide (HYDRODIURIL) 25 mg tablet Take 25 mg by mouth daily.            STOP taking these medications       ciprofloxacin HCl (CIPRO) 500 mg tablet Comments:   Reason for Stopping:         metroNIDAZOLE (FLAGYL) 500 mg tablet Comments:   Reason for Stopping:         naproxen (Naprosyn) 500 mg tablet Comments:   Reason for Stopping:               DISPOSITION:  X  Home With:family   OT  PT  [facility]  RN       Long term SNF/Inpatient Rehab    Independent/assisted living    Hospice Other: PATIENT CONDITION AT DISCHARGE:     Functional status    Poor     Deconditioned    X Independent      Cognition   X  Lucid     Forgetful     Dementia      Catheters/lines (plus indication)    Welsh    X PICC     PEG     None      Code status   X  Full code     DNR      PHYSICAL EXAMINATION AT DISCHARGE:    Constitutional:  No acute distress, cooperative, pleasant    ENT:  Oral mucosa moist.    Resp:  CTA bilaterally. No wheezing/rhonchi/rales. No accessory muscle use. CV:  Regular rhythm, normal rate, S1,S2.    GI/:  Soft, slightly distended, + tender, no guarding, BS present. Voids Freely. RUQ drain. Musculoskeletal:  No edema, warm. Neurologic:  Moves all extremities. AAOx3. Skin:  w/d. Psych:  Good insight, Not anxious nor agitated.          CHRONIC MEDICAL DIAGNOSES:  Problem List as of 12/23/2022 Date Reviewed: 12/8/2022            Codes Class Noted - Resolved    Liver abscess ICD-10-CM: K75.0  ICD-9-CM: 572.0  12/16/2022 - Present        Influenza-like illness ICD-10-CM: J11.1  ICD-9-CM: 487.1  12/7/2022 - Present        Liver mass ICD-10-CM: R16.0  ICD-9-CM: 573.8  12/7/2022 - Present           Greater than 30 minutes were spent with the patient on counseling and coordination of care    Signed:   Jose Pradhan NP  12/23/2022  8:01 AM

## 2022-12-23 NOTE — PROGRESS NOTES
Discharge medications reviewed with patient and appropriate educational materials and side effects teaching were provided. Patient teaching for IV therapy and drain measurement and flush. Patient discharging with PIC line for Home IV until follow up.

## 2022-12-23 NOTE — PROGRESS NOTES
[address] December 23, 2022       RE: Ed Carrillo      To Whom It May Concern,    This is to certify that Ed Carrillo was hospitalized 12/16/2022-12/23/2022 and may return to work on 01/02/2023. Please feel free to contact my office if you have any questions or concerns. Thank you for your assistance in this matter.       Sincerely,  Jorden Woodard NP

## 2022-12-23 NOTE — PROGRESS NOTES
Problem: Falls - Risk of  Goal: *Absence of Falls  Description: Document Earnest William Fall Risk and appropriate interventions in the flowsheet.   Outcome: Resolved/Met     Problem: Patient Education: Go to Patient Education Activity  Goal: Patient/Family Education  Description: Pain management  Fall prevention  Ambulation    Outcome: Resolved/Met     Problem: Pain  Goal: *Control of Pain  Outcome: Resolved/Met  Goal: *PALLIATIVE CARE:  Alleviation of Pain  Outcome: Resolved/Met     Problem: Patient Education: Go to Patient Education Activity  Goal: Patient/Family Education  Outcome: Resolved/Met

## 2022-12-23 NOTE — DISCHARGE INSTRUCTIONS
Discharge Instructions       PATIENT ID: Nona Marte  MRN: [id number]   YOB: 1960    DATE OF ADMISSION: 12/16/2022  DATE OF DISCHARGE: 12/23/2022    PRIMARY CARE PROVIDER: Hemant Valle MD     ATTENDING PHYSICIAN: Hector Gray MD  DISCHARGING PROVIDER: Brittany Granado NP    To contact this individual call [phone] and ask the  to page. If unavailable ask to be transferred the Adult Hospitalist Department. DISCHARGE DIAGNOSES: Hector Gray MD    CONSULTATIONS: IP CONSULT TO INTERVENTIONAL RADIOLOGY  IP CONSULT TO INFECTIOUS DISEASES  IP CONSULT TO GASTROENTEROLOGY  IP CONSULT TO INTERVENTIONAL RADIOLOGY  IP CONSULT TO COLORECTAL SURGERY    PROCEDURES/SURGERIES: * No surgery found *    PENDING TEST RESULTS:   At the time of discharge the following test results are still pending: none. FOLLOW UP APPOINTMENTS: Schedule appointments:    Miladys Medrano MD: GASTROENTEROLOGY, 1 month    Hemant Valle MD: PCP, 3-5 days for hospital discharge follow up    Zoey Hansen MD: [facility] [address], 1-2 weeks    Jose Sorenson MD: INFECTIOUS DISEASES, 1 week      ADDITIONAL CARE RECOMMENDATIONS:     You have been deemed stable for discharge and are being discharged home with family. You have prescribed antibiotics, please complete entire course as ordered.     -Ertapenem 1 g  IV daily  end date 1/27/2023  -Pull PICC -after ID follow-up  Patient will require repeat imaging prior to conclusion of antibiotic therapy.  -Weekly CBC, CMP & ESR/CRP every other week-  -Fax reports to [phone], call with critical labs  at [phone]  [facility] management per IR-please call them   with all questions, concerns  -Encourage adequate fluids, daily probiotic/yogurt  -If PICC malfunction occurs and home health cannot reposition please send patient to ED immediately.   -ID follow-up -1/12 at 1130-in person or virtual.    Should you need medication refills beyond what we have prescribed for you, you will need to contact your primary care provider for refills. Return to the ER or notify your primary care office if you have a fever greater than 101.5 associated with chills, chest pain, or signs and symptoms of a stroke which are:  B E F A S T  -loss of balance, headaches or dizziness  -eyes blurred vision (sudden)  -asymmetrical facial symmetry (side of face droops)  -arms and leg weakness  -slurred speech / difficulty speaking  -if you experience any of these (time to call for an ambulance)     DIET: Regular Diet    ACTIVITY: Activity as tolerated and no driving for today    PICC/IV line CARE:   To help prevent infection, take a shower instead of a bath. Do not go swimming with the catheter. Try to keep the area dry. When you shower, cover the area with waterproof material, such as plastic wrap. Never touch the open end of the catheter if the cap is off      Hepatic Abscess Drain CARE: Flush drain twice daily as instructed, record output daily and bring to follow up CT scan and Interventional Radiology appointment, Tuesday 12/27/2022 11:30a. Out- patient registration. DISCHARGE MEDICATIONS:   See Medication Reconciliation Form    It is important that you take the medication exactly as they are prescribed. Keep your medication in the bottles provided by the pharmacist and keep a list of the medication names, dosages, and times to be taken in your wallet. Do not take other medications without consulting your doctor. NOTIFY YOUR PHYSICIAN FOR ANY OF THE FOLLOWING:   Fever over 101 degrees for 24 hours. Chest pain, shortness of breath, fever, chills, nausea, vomiting, diarrhea, change in mentation, falling, weakness, bleeding. Severe pain or pain not relieved by medications. Or, any other signs or symptoms that you may have questions about.       DISPOSITION:  X  Home With:   OT  PT  HH  RN       SNF/Inpatient Rehab/LTAC    Independent/assisted living    Hospice    Other:     CDMP Checked: Yes X     PROBLEM LIST Updated:   Yes X       Signed:   Hayde Loza NP  12/23/2022  7:55 AM

## 2022-12-23 NOTE — PROGRESS NOTES
ID cross coverage  D/w hospitalist  Patient is afebrile  WBC 9, BUN/creatinine 11/0.48  AST/ALT normal.    Repeat CT report as follows: FINDINGS:   LOWER THORAX: There is ectasia of the ascending aorta measuring 4.4 cm there is  minor basilar atelectasis. There is aortic valvular calcification  LIVER: The largest liver mass which contains a drain measures 8.1 x 6.8 cm and  previously measured 8.5 x 7 cm. There is residual fluid within the cavity which  is slightly diminished. The abscess in segment 6 measures 4.4 x 3.5 cm and is  stable. The lesion in segment 2/3 measures 3.1 x 2.7 cm and is similar to the  prior study. A few other tiny low-density areas are stable. BILIARY TREE: Gallbladder is incompletely distended CBD is not dilated. SPLEEN: within normal limits. PANCREAS: No mass or ductal dilatation. ADRENALS: Unremarkable. KIDNEYS: No mass, calculus, or hydronephrosis. STOMACH: Unremarkable. SMALL BOWEL: No dilatation or wall thickening. COLON: The 1.5 cm intramural collection the sigmoid colon and possible 0.9 cm  fluid collection in the sigmoid colon wall are stable and may represent small  intramural abscesses. APPENDIX: Unremarkable  PERITONEUM: No ascites or pneumoperitoneum. RETROPERITONEUM: No lymphadenopathy or aortic aneurysm. REPRODUCTIVE ORGANS: Uterus is unremarkable  URINARY BLADDER: No mass or calculus. BONES: No destructive bone lesion. ABDOMINAL WALL: There is ventral wall hernias in the midline in the mid abdomen  containing fat  ADDITIONAL COMMENTS: N/A     IMPRESSION     1. There is aortic valvular calcification and correlation for aortic stenosis  would be helpful. . The ascending aorta is ectatic measuring 4.4 cm. 2. The dominant liver abscess which contains a drain is slightly smaller. There  is persistent fluid in the cavity which is also slightly diminished. Other  hepatic abscesses are stable.   3. Slight wall thickening in the sigmoid colon with small intramural abscesses  appear stable.     For DC on antibiotics x6 weeks  For 4-week follow-up, if improved  May transition to p.o. at that point    Antimicrobial orders for discharge  -Ertapenem 1 g  IV daily  end date 1/27/2023  -Pull PICC -after ID follow-up  Patient will require repeat imaging prior to conclusion  Of antibiotic therapy  -Weekly CBC, CMP & ESR/CRP every other week-  -Fax reports to [phone], call with critical labs  at [phone]  [facility] management per IR-please call them   with all questions, concerns  -Encourage adequate fluids, daily probiotic/yogurt  -If PICC malfunction occurs and home health cannot reposition  please send patient to ED immediately  -ID follow-up -1/12 at 1130-in person or virtual.

## 2022-12-23 NOTE — PROGRESS NOTES
Bedside and Verbal shift change report given to [address] [place] [address]  (oncoming nurse) by Cynthia Woods  (offgoing nurse). Report included the following information SBAR.

## 2022-12-23 NOTE — PROGRESS NOTES
Bedside and Verbal shift change report given to [facility] [address] (oncoming nurse) by Altagracia Taylor (offgoing nurse). Report included the following information SBAR, Kardex, Intake/Output, MAR, and Recent Results.

## 2022-12-23 NOTE — PROGRESS NOTES
Hospital follow-up PCP transitional care appointment has been scheduled with Dr. Carlo Laguerre for Thursday, December 29th, 2022 at 11:30 a.m. Mohan Nolan Pending patient discharge.   Belen Avalos, Care Management Assistant

## 2022-12-27 ENCOUNTER — HOSPITAL ENCOUNTER (OUTPATIENT)
Dept: CT IMAGING | Age: 62
Discharge: HOME | End: 2022-12-27
Attending: NURSE PRACTITIONER
Payer: COMMERCIAL

## 2022-12-27 DIAGNOSIS — K75.0 LIVER ABSCESS: ICD-10-CM

## 2022-12-27 DIAGNOSIS — K57.32 DIVERTICULITIS, COLON: ICD-10-CM

## 2022-12-27 PROCEDURE — 74170 CT ABD WO CNTRST FLWD CNTRST: CPT

## 2022-12-27 PROCEDURE — 74011000636 HC RX REV CODE- 636: Performed by: NURSE PRACTITIONER

## 2022-12-27 RX ADMIN — IOPAMIDOL 100 ML: 755 INJECTION, SOLUTION INTRAVENOUS at 13:41

## 2022-12-30 NOTE — CONSULTS
Infectious Disease Consult  Cross coverage      Date of Consultation: 12/23/2022  Reason for Consultation: Liver abscess  Referring Physician: Dr Cecille Pastor  Date of Admission: 12/16/2022    Impression    Sepsis  Fever T-max 100.5  WBC 19.8 on admission. Hepatic abscess  Intramural abscess of sigmoid colon  CT abdomen/pelvis 12/16+ for Multiple thick-walled low-attenuation hepatic masses are again seen, the  largest of which measures 10.4 x 8.2 cm (previously 9.0 x 6.6 cm. Some of the  smaller lesions seen on the previous CT are less conspicuous, but the 3 largest  lesions are increased in size. Focal wall thickening in the sigmoid colon, with probable 2.2 cm  intramural abscess. Mild adjacent pericolonic inflammatory changes are seen. Sigmoid diverticulosis. S/p US guided biopsy of liver abscess 12/9 ( admission 12/7-12/9 at [address] Culture+ for scant  anaerobic GNR  Pathology-negative for malignancy. S/p colonoscopy 12/9-rectal polyp noted, healing diverticulitis, luminal narrowing colon. S/p US guided biopsy of liver abscess 12/19, drain placed. Culture -NGTD  Blood cultures-no growth. CT abdomen/pelvis 12/21+ for Multiple liver abscesses again noted. The largest in the right  hepatic lobe measures approximately 7.0 x 8.5 cm, previously 10.1 x 8.4 cm. There is a pigtail drainage catheter within this abscess cavity, appropriately  positioned. A smaller abscess in the posterior right hepatic lobe measures 4.4 x  3.5 cm, not significantly changed. An abscess in the left hepatic lobe measures  3.0 x 3.0 cm, stable to slightly smaller in the interval.  Small intramural abscess in the sigmoid colon is smaller in the interval,  measuring 1.1 x 1.0 cm containing a gas locule. Repeat CT 12/22 + for largest liver mass which contains a drain measures 8.1 x 6.8 cm and  previously measured 8.5 x 7 cm. There is residual fluid within the cavity which  is slightly diminished.  The abscess in segment 6 measures 4.4 x 3.5 cm and is  stable. The lesion in segment 2/3 measures 3.1 x 2.7 cm and is similar to the  prior study. A few other tiny low-density areas are stable. The 1.5 cm intramural collection the sigmoid colon and possible 0.9 cm  fluid collection in the sigmoid colon wall are stable and may represent small  intramural abscesses. Depression/Anxiety  on ativan. H/o ETOH Abuse  On folic acid      Hypertension  Stable on HCTZ    Cefepime allergy  S/p rash, resolved on discontinuation    Plan  Continue meropenem IV-started on 12/17. S/p cefepime/Flagyl 12/16, 12/17  D/w hospitalist at length  Patient doing well, stable for discharge  For DC on antibiotics x 6 weeks  For 4-week follow-up, if improved  May transition to p.o. at that point. Antimicrobial orders for discharge  -Ertapenem 1 g IV daily  end date 1/27/2023  -Pull PICC -after ID follow-up. Patient will require repeat imaging prior to conclusion  of antibiotic therapy  -Weekly CBC, CMP & ESR/CRP every other week-  -Fax reports to [phone], call with critical labs  at [phone]  [facility] [place] management per IR-please call them   with all questions, concerns  -Encourage adequate fluids, daily probiotic/yogurt  -If PICC malfunction occurs and home health cannot reposition  please send patient to ED immediately  -ID follow-up -1/12 at 1130-in person or virtual.          Mary Jane Riley is a 63 y.o. female with a pmh significant for dyslipidemia, and ETOH dependence who presented with liver abscess. She presented initially to the ED about 2 weeks ago with fevers, diarrhea, vomiting, and weight loss. Patient was admitted at [facility] 12/7-12/9. Work-up revealed stable liver masses, and sigmoid diverticulitis. She underwent colonoscopy on 12/9 which was unremarkable. Patient was noted to have a rectal polyp, also luminal narrowing of the colon. And had a ultrasound-guided liver biopsy performed on 12/9.   This was negative for malignancy. Cultures also taken. Cultures subsequently came back positive for scant anaerobic GNR  Patient was requested to return to ED. Per H&P she was febrile to 100.5, and tachycardic to 120. Labs were significant for WBC 19.8  CT abdomen/pelvis showed multiple liver masses, previously seen with the 3 largest having increased in size. There was also focal wall thickening of the sigmoid colon compatible with acute diverticulitis, and probable small associated intramural abscesses. CT abdomen/pelvis-12/16 as follows  FINDINGS:   LOWER THORAX: No significant abnormality in the incidentally imaged lower chest.  LIVER: Multiple thick-walled low-attenuation hepatic masses are again seen, the  largest of which measures 10.4 x 8.2 cm (previously 9.0 x 6.6 cm. Some of the  smaller lesions seen on the previous CT are less conspicuous, but the 3 largest  lesions are increased in size. BILIARY TREE: Gallbladder is within normal limits. CBD is not dilated. SPLEEN: within normal limits. PANCREAS: No mass or ductal dilatation. ADRENALS: Unremarkable. KIDNEYS: No mass, calculus, or hydronephrosis. STOMACH: Unremarkable. SMALL BOWEL: No dilatation or wall thickening. COLON: Focal wall thickening in the sigmoid colon, with probable 2.2 cm  intramural abscess. Mild adjacent pericolonic inflammatory changes are seen. Sigmoid diverticulosis. APPENDIX: Normal.  PERITONEUM: No ascites or pneumoperitoneum. RETROPERITONEUM: No lymphadenopathy or aortic aneurysm. REPRODUCTIVE ORGANS: Unremarkable. URINARY BLADDER: No mass or calculus. BONES: No destructive bone lesion. Degenerative disc disease at L4-5 and L5-S1. ABDOMINAL WALL: No mass or hernia. ADDITIONAL COMMENTS: N/A     IMPRESSION  1. The 3 largest liver masses seen on prior CT are increased in size, but most  of the smaller lesions are less conspicuous. Findings are compatible with liver  abscesses.   2. Focal wall thickening in the sigmoid colon,, compatible with acute  diverticulitis, with probable small associated intramural abscess      Patient was admitted to the hospitalist service. She was started on empiric antibiotics cefepime and Flagyl. .  Patient initially had fevers. Also developed rash to cefepime on 12/17 and was changed to meropenem IV. ID service was consulted. As ID was not available at [facility] ID service was contacted and provided remote coverage. Patient was discussed at multiple intervals with hospitalist.  Patient continues to do well on meropenem IV. She had improvement in WBC and resolution of fever. Patient had ultrasound-guided drainage of abscess on 12/19. Drain placement also done by IR. Fluid culture 12/19-NG. ID service recommended post drain CT prior to discharge planning. CT abdomen/pelvis done on 12/21. It showed improvement in size of liver abscesses & intraluminal colonic abscess. Report as follows: FINDINGS:   LOWER THORAX: No significant abnormality in the incidentally imaged lower chest.  LIVER: Multiple liver abscesses are again noted. The largest in the right  hepatic lobe measures approximately 7.0 x 8.5 cm, previously 10.1 x 8.4 cm. There is a pigtail drainage catheter within this abscess cavity, appropriately  positioned. A smaller abscess in the posterior right hepatic lobe measures 4.4 x  3.5 cm, not significantly changed. An abscess in the left hepatic lobe measures  3.0 x 3.0 cm, stable to slightly smaller in the interval.   BILIARY TREE: Gallbladder is within normal limits. CBD is not dilated. SPLEEN: within normal limits. PANCREAS: No mass or ductal dilatation. ADRENALS: Unremarkable. KIDNEYS: No mass, calculus, or hydronephrosis. STOMACH: Unremarkable. SMALL BOWEL: No dilatation or wall thickening.   COLON: Small intramural abscess in the sigmoid colon is smaller in the interval,  measuring 1.1 x 1.0 cm containing a gas locule (series 3 image 70).  APPENDIX: Unremarkable. PERITONEUM: No ascites or pneumoperitoneum. RETROPERITONEUM: No lymphadenopathy or aortic aneurysm. REPRODUCTIVE ORGANS: Unremarkable. URINARY BLADDER: No mass or calculus. BONES: No destructive bone lesion. ABDOMINAL WALL: No mass or hernia. ADDITIONAL COMMENTS: N/A     IMPRESSION  1. Interval decrease in size of dominant right hepatic lobe abscess, post  placement of pigtail drainage catheter. Other abscesses are not significantly  changed. 2.  Mild interval decrease in size of small intramural abscess in the sigmoid  colon. 3.  No new abnormalities. Patient was seen by GI on consultation during this admission. Patient was also seen by colorectal surgery. Plan was to complete antibiotic therapy  Outpatient follow-up with [facility] in 2 weeks. Plan for sigmoid colectomy in 6 to 8 weeks, CT abdomen to be done prior to planning surgery  Patient had another CT abdomen/pelvis on 12/22, which showed improvement in abscesses following drain placement. Report as follows: FINDINGS:   LOWER THORAX: There is ectasia of the ascending aorta measuring 4.4 cm there is  minor basilar atelectasis. There is aortic valvular calcification  LIVER: The largest liver mass which contains a drain measures 8.1 x 6.8 cm and  previously measured 8.5 x 7 cm. There is residual fluid within the cavity which  is slightly diminished. The abscess in segment 6 measures 4.4 x 3.5 cm and is  stable. The lesion in segment 2/3 measures 3.1 x 2.7 cm and is similar to the  prior study. A few other tiny low-density areas are stable. BILIARY TREE: Gallbladder is incompletely distended CBD is not dilated. SPLEEN: within normal limits. PANCREAS: No mass or ductal dilatation. ADRENALS: Unremarkable. KIDNEYS: No mass, calculus, or hydronephrosis. STOMACH: Unremarkable. SMALL BOWEL: No dilatation or wall thickening.   COLON: The 1.5 cm intramural collection the sigmoid colon and possible 0.9 cm  fluid collection in the sigmoid colon wall are stable and may represent small  intramural abscesses. APPENDIX: Unremarkable  PERITONEUM: No ascites or pneumoperitoneum. RETROPERITONEUM: No lymphadenopathy or aortic aneurysm. REPRODUCTIVE ORGANS: Uterus is unremarkable  URINARY BLADDER: No mass or calculus. BONES: No destructive bone lesion. ABDOMINAL WALL: There is ventral wall hernias in the midline in the mid abdomen  containing fat  ADDITIONAL COMMENTS: N/A     IMPRESSION     1. There is aortic valvular calcification and correlation for aortic stenosis  would be helpful. . The ascending aorta is ectatic measuring 4.4 cm. 2. The dominant liver abscess which contains a drain is slightly smaller. There  is persistent fluid in the cavity which is also slightly diminished. Other  hepatic abscesses are stable. 3. Slight wall thickening in the sigmoid colon with small intramural abscesses  appear stable. ID service was contacted on 12/23 as patient was stable and ready for discharge. I was told that patient was very adamant that she needed to be discharged. WBC was now down to 9. BUN/creatinine 13/0.47. Patient has no right upper quadrant or abdominal pain. .   Liver enzymes normal.  ID recommended to hospitalist that definite to plan for drain management to be arranged with IR prior to discharge. I was notified that patient had plans for appointment with IR and drain removal.  Chart and labs , ED records H&P, progress notes, consult notes, labs, cultures, imaging reviewed at length. This is an \"e-consult\" with data obtained by chart review and by discussion with the requesting physician. I did not directly examine or interview the patient.   Active Hospital Problems    Diagnosis Date Noted    Liver abscess 12/16/2022         Past Medical History:   Diagnosis Date    High cholesterol     Infectious disease     Other ill-defined conditions(799.89)     fluid build up in chest       Past Surgical History:   Procedure Laterality Date    COLONOSCOPY N/A 12/9/2022    COLONOSCOPY performed by Justin Kate MD at [facility] ENDOSCOPY    HX BREAST BIOPSY         Allergies   Allergen Reactions    Erythromycin Other (comments)     \"Chest Pain\"    Pcn [Penicillins] Rash    Sulfa (Sulfonamide Antibiotics) Rash    Cefepime Rash       Social Connections: Not on file       No family status information on file. Medication Documentation Review Audit       Reviewed by JYOTI WhiteD (Pharmacist) on 12/16/22 at 1916      Medication Sig Documenting Provider Last Dose Status Taking?   acetaminophen (TYLENOL) 325 mg tablet Take 2 Tablets by mouth every six (6) hours as needed for Pain or Fever for up to 30 days. Kalpana Anderson APRN  Active Yes   ciprofloxacin HCl (CIPRO) 500 mg tablet Take 500 mg by mouth two (2) times a day. Provider, Historical 15/17/2163 Active Yes   folic acid (FOLVITE) 1 mg tablet Take 1 Tablet by mouth daily for 30 days. LORENE Oreilly 12/16/2022 Active Yes   hydrochlorothiazide (HYDRODIURIL) 25 mg tablet Take 25 mg by mouth daily. Sayra Romero MD 12/9/2022 Active Yes   LORazepam (ATIVAN) 0.5 mg tablet Take 0.5 mg by mouth daily as needed for Anxiety. Provider, Historical  Active Yes   melatonin 3 mg tablet Take 1 Tablet by mouth nightly as needed for Insomnia. Kalpana Anderson APRN  Active Yes   metroNIDAZOLE (FLAGYL) 500 mg tablet Take 500 mg by mouth three (3) times daily. Provider, Historical 12/16/2022 Active Yes   naproxen (Naprosyn) 500 mg tablet Take 1 Tablet by mouth two (2) times daily (with meals) for 10 days. Derek Garcia MD  Active Yes   ondansetron hcl (Zofran) 4 mg tablet Take 1 Tablet by mouth every eight (8) hours as needed for Nausea. Derek Garcia MD  Active Yes   sertraline (ZOLOFT) 100 mg tablet Take 100 mg by mouth daily.  Provider, Historical 12/9/2022 Active Yes                  Yogi Noland MD [facility]

## 2022-12-30 NOTE — TELEPHONE ENCOUNTER
Brenna calling from home health to get a verbal order to change dressing once weekly on drain.     Call Back: [phone]

## 2023-01-05 ENCOUNTER — DOCUMENTATION ONLY (OUTPATIENT)
Dept: INFECTIOUS DISEASES | Age: 63
End: 2023-01-05

## 2023-01-05 NOTE — PROGRESS NOTES
Patient concerns regarding drain discussed with hospitalist (who took care of patient )Dr Chance Gar last week  And again today. Per  he has called patient. He has discussed issues with her. Please ask patient to call YORDY STEEN and ask for him if she has any further concerns at this time.

## 2023-01-05 NOTE — PROGRESS NOTES
Date of service 1/5/2023 time of service 3:45 PM      I spoke to the patient's daughter Ryann Ghosh, had a detailed discussion, patient reports that she has been trying to get directions about further plan of care, her mother had a CT scan of the abdomen  done on 12/27/2022 as per the appointment, there was not much improvement in the hepatic abscesses. Patient did not have her drain removed at that point of time per interventional radiology. Patient's daughter reports that patient is doing well, afebrile, no abdominal pain, but she is concerned about patient's drain, she reports that she is frustrated with the infectious disease office as she has not been able to get in touch with the physician, she is supposed to see colorectal surgery tomorrow, I have offered the patient's daughter to bring the patient to the ER so we can repeat CT of the abdomen pelvis, get IR consult, may also consult hepatology while she is in the ER and come up with a plan for future course of treatment, daughter reports that she is not too keen to bring the patient to the ER, she would like to manage the patient outpatient, she is seeing colorectal surgery tomorrow and based on the recommendations we will take further decisions, she reports that colorectal surgery is coordinating with home health to calibrate orders as needed, I provided my office telephone number to the daughter told her to call us if she needed any help with guiding home health or any other concerns, the daughter was very appreciative and thanked me multiple times, I told her that if she comes to the ER I will personally expedite her evaluation and therapeutics/diagnostics and she said that she will think about it, she wanted to follow-up with hepatology but on outpatient basis so I provided her with the number for Dr. Holly Eldridge office.   I encouraged the daughter to bring the patient to the ER but she felt that patient will become more anxious and upset if she is brought back to the hospital.

## 2023-01-06 ENCOUNTER — HOSPITAL ENCOUNTER (OUTPATIENT)
Dept: CT IMAGING | Age: 63
End: 2023-01-06
Attending: STUDENT IN AN ORGANIZED HEALTH CARE EDUCATION/TRAINING PROGRAM
Payer: COMMERCIAL

## 2023-01-06 ENCOUNTER — HOSPITAL ENCOUNTER (OUTPATIENT)
Dept: INTERVENTIONAL RADIOLOGY/VASCULAR | Age: 63
End: 2023-01-06
Attending: STUDENT IN AN ORGANIZED HEALTH CARE EDUCATION/TRAINING PROGRAM
Payer: COMMERCIAL

## 2023-01-06 DIAGNOSIS — K57.92 DIVERTICULITIS: ICD-10-CM

## 2023-01-06 PROCEDURE — 74011000636 HC RX REV CODE- 636: Performed by: RADIOLOGY

## 2023-01-06 PROCEDURE — 74177 CT ABD & PELVIS W/CONTRAST: CPT

## 2023-01-06 RX ADMIN — IOPAMIDOL 100 ML: 755 INJECTION, SOLUTION INTRAVENOUS at 13:59

## 2023-01-06 NOTE — PROGRESS NOTES
Hailee NOEL NP removed tube, pt tolerated well. Site CDI, dressed. Pt denies pain or other issues associated with drain removal. Pt ambulated off unit with no issues.

## 2023-01-06 NOTE — DISCHARGE INSTRUCTIONS
DRAIN REMOVAL    DISCHARGE INSTRUCTIONS    General Information:     Your doctor has ordered for us to remove your drain, port, or catheter. This could be that you do not need it anymore, it is not doing its job, your physician has decided on another plan for your treatment and/or it may need replacing. Home Care Instructions: You can resume your regular diet and medication regimen. Do not drink alcohol, drive, or make any important legal decisions in the next 24 hours. Do not lift anything heavier than a gallon of milk, or do anything strenuous for the next 24 hours. You will notice a dressing over the site of the removal. This dressing should stay in place until the site is healed. The dressing should be changed at least every 48 hours. You should change the dressing sooner if it becomes soiled or wet. The nurse who discharges you to home should review with you any wound care instructions. Resume your normal level of activity slowly. You may shower after 24 hours, but do not take a bath or swim or immerse yourself in water until the site has healed, and keep the dressing dry with plastic wrap while showering. The site may ooze for a couple of days. This drainage should lessen with each passing day. Call If:     You should call your Physician and/or the Radiology Nurse if you have any bleeding other than a small spot on your bandage. Call if you have any signs of infection, fever, or increased pain at the site of the tube. Call if the oozing increases, if it changes color, or begins to have an odor. Follow-Up Instructions: Please see your ordering doctor as he/she has requested.      To Reach Us:        Patient Signature:  Date: 1/6/2023  Discharging Nurse: Ramon Bagley RN

## 2023-01-10 ENCOUNTER — OFFICE VISIT (OUTPATIENT)
Dept: INFECTIOUS DISEASES | Age: 63
End: 2023-01-10
Payer: COMMERCIAL

## 2023-01-10 VITALS
BODY MASS INDEX: 28.17 KG/M2 | HEART RATE: 74 BPM | HEIGHT: 64 IN | TEMPERATURE: 97.4 F | WEIGHT: 165 LBS | SYSTOLIC BLOOD PRESSURE: 96 MMHG | DIASTOLIC BLOOD PRESSURE: 65 MMHG | OXYGEN SATURATION: 96 % | RESPIRATION RATE: 18 BRPM

## 2023-01-10 DIAGNOSIS — F10.10 ALCOHOL ABUSE: ICD-10-CM

## 2023-01-10 DIAGNOSIS — F32.A ANXIETY AND DEPRESSION: ICD-10-CM

## 2023-01-10 DIAGNOSIS — A49.8 ANAEROBIC BACTERIAL INFECTION: ICD-10-CM

## 2023-01-10 DIAGNOSIS — K75.0 HEPATIC ABSCESS: Primary | ICD-10-CM

## 2023-01-10 DIAGNOSIS — I10 PRIMARY HYPERTENSION: ICD-10-CM

## 2023-01-10 DIAGNOSIS — Z88.1 ALLERGY TO CEPHALOSPORIN: ICD-10-CM

## 2023-01-10 DIAGNOSIS — K63.0 ABSCESS OF SIGMOID COLON: ICD-10-CM

## 2023-01-10 DIAGNOSIS — A41.50 GRAM NEGATIVE SEPSIS (HCC): ICD-10-CM

## 2023-01-10 DIAGNOSIS — F41.9 ANXIETY AND DEPRESSION: ICD-10-CM

## 2023-01-10 DIAGNOSIS — D72.825 BANDEMIA: ICD-10-CM

## 2023-01-10 PROCEDURE — 3078F DIAST BP <80 MM HG: CPT | Performed by: INTERNAL MEDICINE

## 2023-01-10 PROCEDURE — 3074F SYST BP LT 130 MM HG: CPT | Performed by: INTERNAL MEDICINE

## 2023-01-10 PROCEDURE — 99214 OFFICE O/P EST MOD 30 MIN: CPT | Performed by: INTERNAL MEDICINE

## 2023-01-10 NOTE — PROGRESS NOTES
Infectious Disease follow-up   Cross coverage    Impression    Sepsis  Fever T-max 100.5  Initial WBC 26.3. Current 11.5, afebrile     Hepatic abscess  Intramural abscess of sigmoid colon  CT abdomen/pelvis 12/16+ for Multiple thick-walled low-attenuation hepatic masses are again seen, the  largest of which measures 10.4 x 8.2 cm (previously 9.0 x 6.6 cm. Some of the  smaller lesions seen on the previous CT are less conspicuous, but the 3 largest  lesions are increased in size. Focal wall thickening in the sigmoid colon, with probable 2.2 cm  intramural abscess. Mild adjacent pericolonic inflammatory changes are seen. Sigmoid diverticulosis. S/p US guided biopsy of liver abscess 12/9 ( admission 12/7-12/9 at [facility]). Culture+ for scant  anaerobic GNR  Pathology-negative for malignancy. S/p colonoscopy 12/9-rectal polyp noted, healing diverticulitis, luminal narrowing colon. Most recent CT 1/6-Complex cystic collection in the right hepatic lobe measures 4.7 x 3.1 cm  previously 6.9 x 8.3 cm. Drain is positioned centrally within the collection. There is a smaller second thick walled collection posteriorly within the right  hepatic lobe measuring 3.7 x 3.0 cm previously 4.0 x 3.6 cm. S/p removal of drain on 1/6    S/p US guided biopsy of liver abscess 12/19, drain placed. Culture -NGTD  Blood cultures-no growth. CT abdomen/pelvis 12/21+ for Multiple liver abscesses again noted. The largest in the right  hepatic lobe measures approximately 7.0 x 8.5 cm, previously 10.1 x 8.4 cm. There is a pigtail drainage catheter within this abscess cavity, appropriately  positioned. A smaller abscess in the posterior right hepatic lobe measures 4.4 x  3.5 cm, not significantly changed. An abscess in the left hepatic lobe measures  3.0 x 3.0 cm, stable to slightly smaller in the interval.  Small intramural abscess in the sigmoid colon is smaller in the interval,  measuring 1.1 x 1.0 cm containing a gas locule.   Repeat CT 12/22 + for largest liver mass which contains a drain measures 8.1 x 6.8 cm and  previously measured 8.5 x 7 cm. There is residual fluid within the cavity which  is slightly diminished. The abscess in segment 6 measures 4.4 x 3.5 cm and is  stable. The lesion in segment 2/3 measures 3.1 x 2.7 cm and is similar to the  prior study. A few other tiny low-density areas are stable. The 1.5 cm intramural collection the sigmoid colon and possible 0.9 cm  fluid collection in the sigmoid colon wall are stable and may represent small  intramural abscesses. Depression/Anxiety  on ativan. H/o ETOH Abuse  No more  alcohol consumption per patient      Hypertension  Stable on HCTZ    Cefepime allergy  S/p rash, resolved on discontinuation    Plan  Antimicrobial orders for discharge  -Ertapenem 1 g IV daily  end date 1/27/2023  -Pull PICC on/after 1/27/2023-  Patient will require repeat imaging prior to conclusion  of antibiotic therapy  -Weekly CBC, CMP & ESR/CRP every other week-  -Fax reports to [phone], call with critical labs  at [phone]  --Encourage adequate fluids, daily probiotic/yogurt- d/w pt  -If PICC malfunction occurs and home health cannot reposition  please send patient to ED immediately  -Plan of care discussed with patient, all questions answered. - D/w Dr Giovani Bennett is a 63 y.o. female with a pmh significant for dyslipidemia, and ETOH dependence who presented with liver abscess. She presented initially to the ED about 2 weeks ago with fevers, diarrhea, vomiting, and weight loss. Patient was admitted at [facility] 12/7-12/9. Work-up revealed stable liver masses, and sigmoid diverticulitis. She underwent colonoscopy on 12/9 which was unremarkable. Patient was noted to have a rectal polyp, also luminal narrowing of the colon. And had a ultrasound-guided liver biopsy performed on 12/9. This was negative for malignancy. Cultures also taken.   Cultures subsequently came back positive for scant anaerobic GNR  Patient was requested to return to ED. Per H&P she was febrile to 100.5, and tachycardic to 120. Labs were significant for WBC 19.8  CT abdomen/pelvis showed multiple liver masses, previously seen with the 3 largest having increased in size. There was also focal wall thickening of the sigmoid colon compatible with acute diverticulitis, and probable small associated intramural abscesses. CT abdomen/pelvis-12/16 as follows  FINDINGS:   LOWER THORAX: No significant abnormality in the incidentally imaged lower chest.  LIVER: Multiple thick-walled low-attenuation hepatic masses are again seen, the  largest of which measures 10.4 x 8.2 cm (previously 9.0 x 6.6 cm. Some of the  smaller lesions seen on the previous CT are less conspicuous, but the 3 largest  lesions are increased in size. BILIARY TREE: Gallbladder is within normal limits. CBD is not dilated. SPLEEN: within normal limits. PANCREAS: No mass or ductal dilatation. ADRENALS: Unremarkable. KIDNEYS: No mass, calculus, or hydronephrosis. STOMACH: Unremarkable. SMALL BOWEL: No dilatation or wall thickening. COLON: Focal wall thickening in the sigmoid colon, with probable 2.2 cm  intramural abscess. Mild adjacent pericolonic inflammatory changes are seen. Sigmoid diverticulosis. APPENDIX: Normal.  PERITONEUM: No ascites or pneumoperitoneum. RETROPERITONEUM: No lymphadenopathy or aortic aneurysm. REPRODUCTIVE ORGANS: Unremarkable. URINARY BLADDER: No mass or calculus. BONES: No destructive bone lesion. Degenerative disc disease at L4-5 and L5-S1. ABDOMINAL WALL: No mass or hernia. ADDITIONAL COMMENTS: N/A     IMPRESSION  1. The 3 largest liver masses seen on prior CT are increased in size, but most  of the smaller lesions are less conspicuous. Findings are compatible with liver  abscesses.   2. Focal wall thickening in the sigmoid colon,, compatible with acute  diverticulitis, with probable small associated intramural abscess      Patient was admitted to the hospitalist service. She was started on empiric antibiotics cefepime and Flagyl. .  Patient initially had fevers. Also developed rash to cefepime on 12/17 and was changed to meropenem IV. ID service was consulted. As ID was not available at [facility] ID service was contacted and provided remote coverage. Patient was discussed at multiple intervals with hospitalist.  Patient continues to do well on meropenem IV. She had improvement in WBC and resolution of fever. Patient had ultrasound-guided drainage of abscess on 12/19. Drain placement also done by IR. Fluid culture 12/19-NG. ID service recommended post drain CT prior to discharge planning. CT abdomen/pelvis done on 12/21. It showed improvement in size of liver abscesses & intraluminal colonic abscess. Report as follows: FINDINGS:   LOWER THORAX: No significant abnormality in the incidentally imaged lower chest.  LIVER: Multiple liver abscesses are again noted. The largest in the right  hepatic lobe measures approximately 7.0 x 8.5 cm, previously 10.1 x 8.4 cm. There is a pigtail drainage catheter within this abscess cavity, appropriately  positioned. A smaller abscess in the posterior right hepatic lobe measures 4.4 x  3.5 cm, not significantly changed. An abscess in the left hepatic lobe measures  3.0 x 3.0 cm, stable to slightly smaller in the interval.   BILIARY TREE: Gallbladder is within normal limits. CBD is not dilated. SPLEEN: within normal limits. PANCREAS: No mass or ductal dilatation. ADRENALS: Unremarkable. KIDNEYS: No mass, calculus, or hydronephrosis. STOMACH: Unremarkable. SMALL BOWEL: No dilatation or wall thickening. COLON: Small intramural abscess in the sigmoid colon is smaller in the interval,  measuring 1.1 x 1.0 cm containing a gas locule (series 3 image 70). APPENDIX: Unremarkable.   PERITONEUM: No ascites or pneumoperitoneum. RETROPERITONEUM: No lymphadenopathy or aortic aneurysm. REPRODUCTIVE ORGANS: Unremarkable. URINARY BLADDER: No mass or calculus. BONES: No destructive bone lesion. ABDOMINAL WALL: No mass or hernia. ADDITIONAL COMMENTS: N/A     IMPRESSION  1. Interval decrease in size of dominant right hepatic lobe abscess, post  placement of pigtail drainage catheter. Other abscesses are not significantly  changed. 2.  Mild interval decrease in size of small intramural abscess in the sigmoid  colon. 3.  No new abnormalities. Patient was seen by GI on consultation during this admission. Patient was also seen by colorectal surgery. Plan was to complete antibiotic therapy  Outpatient follow-up with [facility] in 2 weeks. Plan for sigmoid colectomy in 6 to 8 weeks, CT abdomen to be done prior to planning surgery  Patient had another CT abdomen/pelvis on 12/22, which showed improvement in abscesses following drain placement. Report as follows: FINDINGS:   LOWER THORAX: There is ectasia of the ascending aorta measuring 4.4 cm there is  minor basilar atelectasis. There is aortic valvular calcification  LIVER: The largest liver mass which contains a drain measures 8.1 x 6.8 cm and  previously measured 8.5 x 7 cm. There is residual fluid within the cavity which  is slightly diminished. The abscess in segment 6 measures 4.4 x 3.5 cm and is  stable. The lesion in segment 2/3 measures 3.1 x 2.7 cm and is similar to the  prior study. A few other tiny low-density areas are stable. BILIARY TREE: Gallbladder is incompletely distended CBD is not dilated. SPLEEN: within normal limits. PANCREAS: No mass or ductal dilatation. ADRENALS: Unremarkable. KIDNEYS: No mass, calculus, or hydronephrosis. STOMACH: Unremarkable. SMALL BOWEL: No dilatation or wall thickening.   COLON: The 1.5 cm intramural collection the sigmoid colon and possible 0.9 cm  fluid collection in the sigmoid colon wall are stable and may represent small  intramural abscesses. APPENDIX: Unremarkable  PERITONEUM: No ascites or pneumoperitoneum. RETROPERITONEUM: No lymphadenopathy or aortic aneurysm. REPRODUCTIVE ORGANS: Uterus is unremarkable  URINARY BLADDER: No mass or calculus. BONES: No destructive bone lesion. ABDOMINAL WALL: There is ventral wall hernias in the midline in the mid abdomen  containing fat  ADDITIONAL COMMENTS: N/A     IMPRESSION     1. There is aortic valvular calcification and correlation for aortic stenosis  would be helpful. . The ascending aorta is ectatic measuring 4.4 cm. 2. The dominant liver abscess which contains a drain is slightly smaller. There  is persistent fluid in the cavity which is also slightly diminished. Other  hepatic abscesses are stable. 3. Slight wall thickening in the sigmoid colon with small intramural abscesses  appear stable. Patient is seen today in the office on follow-up of hospital visit  She is doing well overall. Feels very tired, some right-sided bloating of abdomen. Denies fever or chills. Discussed labs with patient. Discussed most recent CT abdomen abdomen result  Case also discussed with Dr. Micheal Kilgore. Labs discussed with bio scripts. Labs reviewed-1/2  WBC 11.5, hemoglobin 10.3, platelets 764, ESR 47  Metabolic panel unavailable. Past Medical History:   Diagnosis Date    High cholesterol     Infectious disease     Other ill-defined conditions(799.89)     fluid build up in chest       Past Surgical History:   Procedure Laterality Date    COLONOSCOPY N/A 12/9/2022    COLONOSCOPY performed by Montana Mora MD at [facility] ENDOSCOPY    HX BREAST BIOPSY         Allergies   Allergen Reactions    Erythromycin Other (comments)     \"Chest Pain\"    Pcn [Penicillins] Rash    Sulfa (Sulfonamide Antibiotics) Rash    Cefepime Rash       Social Connections: Not on file       No family status information on file.      Review of Systems - Negative except those mentioned in H&P.  14 point ROS reviewed  PHYSICAL EXAM:  General:          Awake , cooperative, no acute distress    EENT:              EOMI. Anicteric sclerae. MMM  Resp:               CTA bilaterally, no wheezing or rales. No accessory muscle use  CV:                  Regular  rhythm,  No edema  GI:                   Soft, Non distended, Non tender. +Bowel sounds  Neurologic:      Alert and oriented X 3, normal speech,   Psych:             Good insight. Not anxious nor agitated  Skin:                No rashes. No jaundice.   Extremities: No clubbing cyanosis edema    Herminia Chihuahua, MD Leopold Pace

## 2023-01-10 NOTE — PATIENT INSTRUCTIONS
Selphee Activation    Thank you for requesting access to Selphee. Please follow the instructions below to securely access and download your online medical record. Selphee allows you to send messages to your doctor, view your test results, renew your prescriptions, schedule appointments, and more. How Do I Sign Up? In your internet browser, go to [email or web address]. Click on the First Time User? Click Here link in the Sign In box. You will see the New Member Sign Up page. Enter your Selphee Access Code exactly as it appears below. You will not need to use this code after youve completed the sign-up process. If you do not sign up before the expiration date, you must request a new code. Selphee Access Code: Activation code not generated  Current Selphee Status: Active (This is the date your Selphee access code will )    Enter the last four digits of your Social Security Number (xxxx) and Date of Birth (mm/dd/yyyy) as indicated and click Submit. You will be taken to the next sign-up page. Create a Selphee ID. This will be your Selphee login ID and cannot be changed, so think of one that is secure and easy to remember. Create a Selphee password. You can change your password at any time. Enter your Password Reset Question and Answer. This can be used at a later time if you forget your password. Enter your e-mail address. You will receive e-mail notification when new information is available in [address] Click Sign Up. You can now view and download portions of your medical record. Click the [place] link to download a portable copy of your medical information. Additional Information    If you have questions, please visit the Frequently Asked Questions section of the Selphee website at [email or web address] Remember, Selphee is NOT to be used for urgent needs. For medical emergencies, dial 911.

## 2023-01-10 NOTE — PROGRESS NOTES
Chief Complaint   Patient presents with    Hospital Follow Up     1. \"Have you been to the ER, urgent care clinic since your last visit? Hospitalized since your last visit? \" Yes    2. \"Have you seen or consulted any other health care providers outside of the [address] [place] [address] since your last visit? \" No     3. For patients aged 39-70: Has the patient had a colonoscopy / FIT/ Cologuard? Yes      If the patient is female:    4. For patients aged 41-77: Has the patient had a mammogram within the past 2 years? Yes      5. For patients aged 21-65: Has the patient had a pap smear? Yes    Pt identified by 2 identifiers. Pt have no complaints at this time.

## 2023-02-08 ENCOUNTER — HOSPITAL ENCOUNTER (OUTPATIENT)
Dept: PREADMISSION TESTING | Age: 63
Discharge: HOME | End: 2023-02-08
Attending: SURGERY
Payer: COMMERCIAL

## 2023-02-08 VITALS
RESPIRATION RATE: 18 BRPM | BODY MASS INDEX: 28.12 KG/M2 | OXYGEN SATURATION: 96 % | TEMPERATURE: 98.8 F | SYSTOLIC BLOOD PRESSURE: 102 MMHG | DIASTOLIC BLOOD PRESSURE: 88 MMHG | HEIGHT: 64 IN | HEART RATE: 86 BPM | WEIGHT: 164.68 LBS

## 2023-02-08 LAB
ABO + RH BLD: NORMAL
ALBUMIN SERPL-MCNC: 4.1 G/DL (ref 3.5–5)
ALBUMIN/GLOB SERPL: 1 (ref 1.1–2.2)
ALP SERPL-CCNC: 69 U/L (ref 45–117)
ALT SERPL-CCNC: 32 U/L (ref 12–78)
ANION GAP SERPL CALC-SCNC: 5 MMOL/L (ref 5–15)
APPEARANCE UR: CLEAR
APTT PPP: 27.4 SEC (ref 22.1–31)
AST SERPL-CCNC: 15 U/L (ref 15–37)
BACTERIA URNS QL MICRO: NEGATIVE /HPF
BILIRUB SERPL-MCNC: 0.6 MG/DL (ref 0.2–1)
BILIRUB UR QL: NEGATIVE
BLOOD GROUP ANTIBODIES SERPL: NORMAL
BUN SERPL-MCNC: 11 MG/DL (ref 6–20)
BUN/CREAT SERPL: 13 (ref 12–20)
CALCIUM SERPL-MCNC: 10.1 MG/DL (ref 8.5–10.1)
CHLORIDE SERPL-SCNC: 101 MMOL/L (ref 97–108)
CO2 SERPL-SCNC: 28 MMOL/L (ref 21–32)
COLOR UR: NORMAL
CREAT SERPL-MCNC: 0.83 MG/DL (ref 0.55–1.02)
EPITH CASTS URNS QL MICRO: NORMAL /LPF
ERYTHROCYTE [DISTWIDTH] IN BLOOD BY AUTOMATED COUNT: 14.8 % (ref 11.5–14.5)
EST. AVERAGE GLUCOSE BLD GHB EST-MCNC: 88 MG/DL
GLOBULIN SER CALC-MCNC: 4.3 G/DL (ref 2–4)
GLUCOSE SERPL-MCNC: 102 MG/DL (ref 65–100)
GLUCOSE UR STRIP.AUTO-MCNC: NEGATIVE MG/DL
HBA1C MFR BLD: 4.7 % (ref 4–5.6)
HCT VFR BLD AUTO: 42.6 % (ref 35–47)
HGB BLD-MCNC: 14 G/DL (ref 11.5–16)
HGB UR QL STRIP: NEGATIVE
HYALINE CASTS URNS QL MICRO: NORMAL /LPF (ref 0–2)
INR PPP: 1 (ref 0.9–1.1)
KETONES UR QL STRIP.AUTO: NEGATIVE MG/DL
LEUKOCYTE ESTERASE UR QL STRIP.AUTO: NEGATIVE
MAGNESIUM SERPL-MCNC: 2.2 MG/DL (ref 1.6–2.4)
MCH RBC QN AUTO: 29.2 PG (ref 26–34)
MCHC RBC AUTO-ENTMCNC: 32.9 G/DL (ref 30–36.5)
MCV RBC AUTO: 88.9 FL (ref 80–99)
NITRITE UR QL STRIP.AUTO: NEGATIVE
NRBC # BLD: 0 K/UL (ref 0–0.01)
NRBC BLD-RTO: 0 PER 100 WBC
PH UR STRIP: 5.5 (ref 5–8)
PHOSPHATE SERPL-MCNC: 4.2 MG/DL (ref 2.6–4.7)
PLATELET # BLD AUTO: 420 K/UL (ref 150–400)
PMV BLD AUTO: 9.8 FL (ref 8.9–12.9)
POTASSIUM SERPL-SCNC: 3.8 MMOL/L (ref 3.5–5.1)
PROT SERPL-MCNC: 8.4 G/DL (ref 6.4–8.2)
PROT UR STRIP-MCNC: NEGATIVE MG/DL
PROTHROMBIN TIME: 10.4 SEC (ref 9–11.1)
RBC # BLD AUTO: 4.79 M/UL (ref 3.8–5.2)
RBC #/AREA URNS HPF: NORMAL /HPF (ref 0–5)
SODIUM SERPL-SCNC: 134 MMOL/L (ref 136–145)
SP GR UR REFRACTOMETRY: 1.01
SPECIMEN EXP DATE BLD: NORMAL
THERAPEUTIC RANGE,PTTT: NORMAL SECS (ref 58–77)
UA: UC IF INDICATED,UAUC: NORMAL
UROBILINOGEN UR QL STRIP.AUTO: 0.2 EU/DL (ref 0.2–1)
WBC # BLD AUTO: 8.8 K/UL (ref 3.6–11)
WBC URNS QL MICRO: NORMAL /HPF (ref 0–4)

## 2023-02-08 PROCEDURE — 81001 URINALYSIS AUTO W/SCOPE: CPT

## 2023-02-08 PROCEDURE — 84100 ASSAY OF PHOSPHORUS: CPT

## 2023-02-08 PROCEDURE — 86900 BLOOD TYPING SEROLOGIC ABO: CPT

## 2023-02-08 PROCEDURE — 80053 COMPREHEN METABOLIC PANEL: CPT

## 2023-02-08 PROCEDURE — 36415 COLL VENOUS BLD VENIPUNCTURE: CPT

## 2023-02-08 PROCEDURE — 83036 HEMOGLOBIN GLYCOSYLATED A1C: CPT

## 2023-02-08 PROCEDURE — 85027 COMPLETE CBC AUTOMATED: CPT

## 2023-02-08 PROCEDURE — 85610 PROTHROMBIN TIME: CPT

## 2023-02-08 PROCEDURE — 83735 ASSAY OF MAGNESIUM: CPT

## 2023-02-08 PROCEDURE — 85730 THROMBOPLASTIN TIME PARTIAL: CPT

## 2023-02-08 RX ORDER — GABAPENTIN 300 MG/1
600 CAPSULE ORAL ONCE
Status: CANCELLED | OUTPATIENT
Start: 2023-02-14 | End: 2023-02-14

## 2023-02-08 NOTE — PERIOP NOTES
[facility]  Preoperative Instructions        Surgery Date Tuesday, 2/14          Time of Arrival TBD/TBC @ [phone]    1. On the day of your surgery, please report to the Surgical Services Registration Desk and sign in at your designated time. The Surgery Center is located to the right of the Emergency Room. 2. You must have someone with you to drive you home. You should not drive a car for 24 hours following surgery. Please make arrangements for a friend or family member to stay with you for the first 24 hours after your surgery. 3. Refer to your handbook for instructions on drinking liquids prior to surgery. 4. We recommend you do not drink any alcoholic beverages for 24 hours before and after your surgery. 5. Contact your surgeons office for instructions on the following medications: non-steroidal anti-inflammatory drugs (i.e. Advil, Aleve), vitamins, and supplements. (Some surgeons will want you to stop these medications prior to surgery and others may allow you to take them)  **If you are currently taking Plavix, Coumadin, Aspirin and/or other blood-thinning agents, contact your surgeon for instructions. ** Your surgeon will partner with the physician prescribing these medications to determine if it is safe to stop or if you need to continue taking. Please do not stop taking these medications without instructions from your surgeon    6. Wear comfortable clothes. Wear glasses instead of contacts. Do not bring any money or jewelry. Please bring picture ID, insurance card, and any prearranged co-payment or hospital payment. Do not wear make-up, particularly mascara the morning of your surgery. Do not wear nail polish, particularly if you are having foot /hand surgery. Wear your hair loose or down, no ponytails, buns, sangeeta pins or clips. All body piercings must be removed.   Please shower with antibacterial soap for three consecutive days before and on the morning of surgery, but do not apply any lotions, powders or deodorants after the shower on the day of surgery. Please use a fresh towels after each shower. Please sleep in clean clothes and change bed linens the night before surgery. Please do not shave for 48 hours prior to surgery. Shaving of the face is acceptable. 7. You should understand that if you do not follow these instructions your surgery may be cancelled. If your physical condition changes (I.e. fever, cold or flu) please contact your surgeon as soon as possible. 8. It is important that you be on time. If a situation occurs where you may be late, please call [phone] (OR Holding Area). 9. If you have any questions and or problems, please call [phone] (Pre-admission Testing). 10. Your surgery time may be subject to change. You will receive a phone call the business evening prior with your arrival time. 11.  If having outpatient surgery, you must have someone to drive you here, stay with you during the duration of your stay, and to drive you home at time of discharge. Special Instructions:    Wednesday, 2/8/23:  Start using your incentive spirometer 20x per day   Wednesday, 2/8/23 - Sunday, 2/12/23:  Drink your morning & evening shakes  Friday, 2/10/23 - Monday, 2/13/23:  Shower with antibacterial soap, such as Dial, every night  Monday, 2/13/23:  Have a regular breakfast and lunch (until 12pm) then remain on clear liquids the remainder of the day   Take your pre-operative antibiotics per instructions  Take your bowel prep per instructions  Drink your pre-surgery drink at bedtime  Apply Hibiclens antiseptic skin cleanser (after showering with antibacterial soap) per instructions  Tuesday, 2/14/23:  Shower with antibacterial soap   Drink your pre-surgery drink an hour prior to arrival for surgery    TAKE ALL MEDICATIONS THE DAY OF SURGERY EXCEPT: Follow your physician/surgeon instructions for holding all non-steroidal anti-inflammatory drugs/NSAIDs, blood-thinning agents, vitamins & supplements FIVE DAYS prior to surgery. I understand a pre-operative phone call will be made to verify my surgery time. In the event that I am not available, I give permission for a message to be left on my answering service and/or with another person?   yes         ___________________      __________   _________    (Signature of Patient)             (Witness)                (Date and Time)

## 2023-02-08 NOTE — PERIOP NOTES
The [employer] ([employer]) book was reviewed with the patient during the pre-admission testing (PAT) appointment. An opportunity for questions was provided, patient verbalized understanding.

## 2023-02-08 NOTE — ADVANCED PRACTICE NURSE
EKG from 12/17/22 reviewed with Dr. Km Lacey, anesthesiologist and compared with previous EKG from 12/7/22. Planned procedure, PMHx, functional status reviewed.  Pt ok to proceed with planned procedure per Dr. Km Lacey

## 2023-02-08 NOTE — PERIOP NOTES

## 2023-02-08 NOTE — PERIOP NOTES
Instructions on When You Can   Eat or Drink Before Surgery      You have been provided 2 pre-surgery drinks received at your pre-admission testing appointment. Night before surgery: You should drink one bottle of the  pre-surgery drink at bedtime. No food after midnight! Day of Surgery:  Complete 2nd bottle of the pre-surgery drink 1 hour prior to arrival at hospital.  For questions call Pre-Admission Testing at [phone]. They are available from 8:00am-5:00pm, Monday through Friday.

## 2023-02-08 NOTE — PERIOP NOTES
Hibiclens/Chlorhexidine    Preventing Infections Before and After - Your Surgery    IMPORTANT INSTRUCTIONS    Please read and follow these instructions carefully. If you are unable to comply with the below instructions your procedure will be cancelled. Every Night for Three (3) nights before your surgery:  Shower with an antibacterial soap, such as Dial, or the soap provided at your preassessment appointment. A shower is better than a bath for cleaning your skin. If needed, ask someone to help you reach all areas of your body. Dont forget to clean your belly button with every shower. The night before your surgery: If you lose your Hibiclens/chlorhexidine please contact surgery center or you can purchase it at a local pharmacy  On the night before your surgery, shower with an antibacterial soap, such as Dial, or the soap provided at your preassessment appointment. With one packet of Hibiclens/Chlorhexidine in hand, turn water off. Apply Hibiclens antiseptic skin cleanser with a clean, freshly washed washcloth. Gently apply to your body from chin to toes (except the genital area) and especially the area(s) where your incision(s) will be. Leave Hibiclens/Chlorhexidine on your skin for at least 20 seconds. CAUTION: If needed, Hibiclens/chlorhexidine may be used to clean the folds of skin of the legs (such as in the area of the groin) and on your buttocks and hips. However, do not use Hibiclens/Chlorhexidine above the neck or in the genital area (your bottom) or put inside any area of your body. Turn the water back on and rinse. Dry gently with a clean, freshly washed towel. After your shower, do not use any powder, deodorant, perfumes or lotion. Use clean, freshly washed towels and washcloths every time you shower. Wear clean, freshly washed pajamas to bed the night before surgery. Sleep on clean, freshly washed sheets. Do not allow pets to sleep in your bed with you.         The Morning of your surgery:  Shower again thoroughly with an antibacterial soap, such as Dial or the soap provided at your preassessment appointment. If needed, ask someone for help to reach all areas of your body. Dont forget to clean your belly button! Rinse. Dry gently with a clean, freshly washed towel. After your shower, do not use any powder, deodorant, perfumes or lotion prior to surgery. Put on clean, freshly washed clothing. Tips to help prevent infections after your surgery:  Protect your surgical wound from germs:  Hand washing is the most important thing you and your caregivers can do to prevent infections. Keep your bandage clean and dry! Do not touch your surgical wound. Use clean, freshly washed towels and washcloths every time you shower; do not share bath linens with others. Until your surgical wound is healed, wear clothing and sleep on bed linens each day that are clean and freshly washed. Do not allow pets to sleep in your bed with you or touch your surgical wound. Do not smoke - smoking delays wound healing. This may be a good time to stop smoking. If you have diabetes, it is important for you to manage your blood sugar levels properly before your surgery as well as after your surgery. Poorly managed blood sugar levels slow down wound healing and prevent you from healing completely. If you lose your Hibiclens/chlorhexidine, please call the [facility], or it is available for purchase at your pharmacy.                ___________________      ___________________      ________________  (Signature of Patient)          (Witness)                   (Date and Time)

## 2023-02-09 LAB
BACTERIA SPEC CULT: NORMAL
BACTERIA SPEC CULT: NORMAL
SERVICE CMNT-IMP: NORMAL

## 2023-02-13 NOTE — PERIOP NOTES
Left message with Dr. Mehreen Browning office concerning the gabapentin holding area order. Requested call back for clarification .

## 2023-02-13 NOTE — PERIOP NOTES
May hold gabapentin holding order per Bindu Madrigal NP recommendations related to studies linking post op respiratory depression.

## 2023-02-14 ENCOUNTER — HOSPITAL ENCOUNTER (INPATIENT)
Age: 63
LOS: 2 days | Discharge: HOME | DRG: 331 | End: 2023-02-16
Attending: SURGERY | Admitting: SURGERY
Payer: COMMERCIAL

## 2023-02-14 ENCOUNTER — ANESTHESIA (OUTPATIENT)
Dept: SURGERY | Age: 63
DRG: 331 | End: 2023-02-14
Payer: COMMERCIAL

## 2023-02-14 ENCOUNTER — ANESTHESIA EVENT (OUTPATIENT)
Dept: SURGERY | Age: 63
DRG: 331 | End: 2023-02-14
Payer: COMMERCIAL

## 2023-02-14 DIAGNOSIS — K57.92 DIVERTICULITIS: Primary | ICD-10-CM

## 2023-02-14 PROCEDURE — 77030005513 HC CATH URETH FOL11 MDII -B: Performed by: SURGERY

## 2023-02-14 PROCEDURE — 0DJD8ZZ INSPECTION OF LOWER INTESTINAL TRACT, VIA NATURAL OR ARTIFICIAL OPENING ENDOSCOPIC: ICD-10-PCS | Performed by: SURGERY

## 2023-02-14 PROCEDURE — 74011250636 HC RX REV CODE- 250/636: Performed by: SURGERY

## 2023-02-14 PROCEDURE — 77030011808 HC STPLR ENDOSCOPIC J&J -D: Performed by: SURGERY

## 2023-02-14 PROCEDURE — 77030002986 HC SUT PROL J&J -A: Performed by: SURGERY

## 2023-02-14 PROCEDURE — 74011250637 HC RX REV CODE- 250/637: Performed by: SURGERY

## 2023-02-14 PROCEDURE — 77030019908 HC STETH ESOPH SIMS -A: Performed by: ANESTHESIOLOGY

## 2023-02-14 PROCEDURE — 77030008771 HC TU NG SALEM SUMP -A: Performed by: ANESTHESIOLOGY

## 2023-02-14 PROCEDURE — 94760 N-INVAS EAR/PLS OXIMETRY 1: CPT

## 2023-02-14 PROCEDURE — 74011250636 HC RX REV CODE- 250/636: Performed by: ANESTHESIOLOGY

## 2023-02-14 PROCEDURE — 76010000132 HC OR TIME 2.5 TO 3 HR: Performed by: SURGERY

## 2023-02-14 PROCEDURE — 77030031139 HC SUT VCRL2 J&J -A: Performed by: SURGERY

## 2023-02-14 PROCEDURE — 77030018684: Performed by: SURGERY

## 2023-02-14 PROCEDURE — 74011000250 HC RX REV CODE- 250: Performed by: SURGERY

## 2023-02-14 PROCEDURE — 74011250637 HC RX REV CODE- 250/637: Performed by: NURSE ANESTHETIST, CERTIFIED REGISTERED

## 2023-02-14 PROCEDURE — 77030026438 HC STYL ET INTUB CARD -A: Performed by: ANESTHESIOLOGY

## 2023-02-14 PROCEDURE — 77030018673: Performed by: SURGERY

## 2023-02-14 PROCEDURE — 2709999900 HC NON-CHARGEABLE SUPPLY

## 2023-02-14 PROCEDURE — 74011000254 HC RX REV CODE- 254: Performed by: NURSE ANESTHETIST, CERTIFIED REGISTERED

## 2023-02-14 PROCEDURE — 77030002933 HC SUT MCRYL J&J -A: Performed by: SURGERY

## 2023-02-14 PROCEDURE — 88307 TISSUE EXAM BY PATHOLOGIST: CPT

## 2023-02-14 PROCEDURE — C1758 CATHETER, URETERAL: HCPCS | Performed by: SURGERY

## 2023-02-14 PROCEDURE — 77030040923 HC STPLR ENDOSC ECHELON J&J -E: Performed by: SURGERY

## 2023-02-14 PROCEDURE — 74011000250 HC RX REV CODE- 250: Performed by: NURSE ANESTHETIST, CERTIFIED REGISTERED

## 2023-02-14 PROCEDURE — 77010033678 HC OXYGEN DAILY

## 2023-02-14 PROCEDURE — 74011250636 HC RX REV CODE- 250/636: Performed by: NURSE ANESTHETIST, CERTIFIED REGISTERED

## 2023-02-14 PROCEDURE — 88305 TISSUE EXAM BY PATHOLOGIST: CPT

## 2023-02-14 PROCEDURE — 77030034667 HC ACC PLATFRM ENDO GELPNT AMR -E: Performed by: SURGERY

## 2023-02-14 PROCEDURE — 76060000036 HC ANESTHESIA 2.5 TO 3 HR: Performed by: SURGERY

## 2023-02-14 PROCEDURE — 77030025171 HC FCPS ENDOSC DISP 2 J&J -B: Performed by: SURGERY

## 2023-02-14 PROCEDURE — 77030008756 HC TU IRR SUC STRY -B: Performed by: SURGERY

## 2023-02-14 PROCEDURE — 77030013079 HC BLNKT BAIR HGGR 3M -A: Performed by: ANESTHESIOLOGY

## 2023-02-14 PROCEDURE — 2709999900 HC NON-CHARGEABLE SUPPLY: Performed by: SURGERY

## 2023-02-14 PROCEDURE — C1769 GUIDE WIRE: HCPCS | Performed by: SURGERY

## 2023-02-14 PROCEDURE — 77030016151 HC PROTCTR LNS DFOG COVD -B: Performed by: SURGERY

## 2023-02-14 PROCEDURE — 77030010285 HC STPLR INT PRSTRNG COVD -B: Performed by: SURGERY

## 2023-02-14 PROCEDURE — 77030012799 HC TRCR GELPRT BLN AMR -B: Performed by: SURGERY

## 2023-02-14 PROCEDURE — 74011000254 HC RX REV CODE- 254: Performed by: SURGERY

## 2023-02-14 PROCEDURE — 0T788DZ DILATION OF BILATERAL URETERS WITH INTRALUMINAL DEVICE, VIA NATURAL OR ARTIFICIAL OPENING ENDOSCOPIC: ICD-10-PCS | Performed by: SURGERY

## 2023-02-14 PROCEDURE — 76210000016 HC OR PH I REC 1 TO 1.5 HR: Performed by: SURGERY

## 2023-02-14 PROCEDURE — 77030008606 HC TRCR ENDOSC KII AMR -B: Performed by: SURGERY

## 2023-02-14 PROCEDURE — 77030034628 HC LIGASURE LAP SEAL DIV MD COVD -F: Performed by: SURGERY

## 2023-02-14 PROCEDURE — 77030002966 HC SUT PDS J&J -A: Performed by: SURGERY

## 2023-02-14 PROCEDURE — 74011250637 HC RX REV CODE- 250/637: Performed by: NURSE PRACTITIONER

## 2023-02-14 PROCEDURE — 0DTN4ZZ RESECTION OF SIGMOID COLON, PERCUTANEOUS ENDOSCOPIC APPROACH: ICD-10-PCS | Performed by: SURGERY

## 2023-02-14 PROCEDURE — 77030008684 HC TU ET CUF COVD -B: Performed by: ANESTHESIOLOGY

## 2023-02-14 PROCEDURE — 65270000029 HC RM PRIVATE

## 2023-02-14 RX ORDER — CELECOXIB 200 MG/1
200 CAPSULE ORAL ONCE
Status: COMPLETED | OUTPATIENT
Start: 2023-02-14 | End: 2023-02-14

## 2023-02-14 RX ORDER — METRONIDAZOLE 500 MG/100ML
500 INJECTION, SOLUTION INTRAVENOUS ONCE
Status: COMPLETED | OUTPATIENT
Start: 2023-02-14 | End: 2023-02-14

## 2023-02-14 RX ORDER — BACLOFEN 10 MG/1
10 TABLET ORAL
Status: DISCONTINUED | OUTPATIENT
Start: 2023-02-14 | End: 2023-02-16 | Stop reason: HOSPADM

## 2023-02-14 RX ORDER — HYDROMORPHONE HYDROCHLORIDE 1 MG/ML
0.25 INJECTION, SOLUTION INTRAMUSCULAR; INTRAVENOUS; SUBCUTANEOUS
Status: DISCONTINUED | OUTPATIENT
Start: 2023-02-14 | End: 2023-02-16 | Stop reason: HOSPADM

## 2023-02-14 RX ORDER — MAGNESIUM SULFATE HEPTAHYDRATE 40 MG/ML
INJECTION, SOLUTION INTRAVENOUS AS NEEDED
Status: DISCONTINUED | OUTPATIENT
Start: 2023-02-14 | End: 2023-02-14 | Stop reason: HOSPADM

## 2023-02-14 RX ORDER — MIDAZOLAM HYDROCHLORIDE 1 MG/ML
INJECTION, SOLUTION INTRAMUSCULAR; INTRAVENOUS AS NEEDED
Status: DISCONTINUED | OUTPATIENT
Start: 2023-02-14 | End: 2023-02-14 | Stop reason: HOSPADM

## 2023-02-14 RX ORDER — ONDANSETRON 2 MG/ML
INJECTION INTRAMUSCULAR; INTRAVENOUS AS NEEDED
Status: DISCONTINUED | OUTPATIENT
Start: 2023-02-14 | End: 2023-02-14 | Stop reason: HOSPADM

## 2023-02-14 RX ORDER — SODIUM CHLORIDE, SODIUM LACTATE, POTASSIUM CHLORIDE, CALCIUM CHLORIDE 600; 310; 30; 20 MG/100ML; MG/100ML; MG/100ML; MG/100ML
75 INJECTION, SOLUTION INTRAVENOUS CONTINUOUS
Status: DISCONTINUED | OUTPATIENT
Start: 2023-02-14 | End: 2023-02-15

## 2023-02-14 RX ORDER — PROPOFOL 10 MG/ML
INJECTION, EMULSION INTRAVENOUS AS NEEDED
Status: DISCONTINUED | OUTPATIENT
Start: 2023-02-14 | End: 2023-02-14 | Stop reason: HOSPADM

## 2023-02-14 RX ORDER — ACETAMINOPHEN 325 MG/1
650 TABLET ORAL EVERY 6 HOURS
Status: DISCONTINUED | OUTPATIENT
Start: 2023-02-14 | End: 2023-02-16 | Stop reason: HOSPADM

## 2023-02-14 RX ORDER — SUCCINYLCHOLINE CHLORIDE 20 MG/ML
INJECTION INTRAMUSCULAR; INTRAVENOUS AS NEEDED
Status: DISCONTINUED | OUTPATIENT
Start: 2023-02-14 | End: 2023-02-14 | Stop reason: HOSPADM

## 2023-02-14 RX ORDER — LIDOCAINE HYDROCHLORIDE ANHYDROUS AND DEXTROSE MONOHYDRATE .8; 5 G/100ML; G/100ML
0.5 INJECTION, SOLUTION INTRAVENOUS CONTINUOUS
Status: ACTIVE | OUTPATIENT
Start: 2023-02-14 | End: 2023-02-15

## 2023-02-14 RX ORDER — ENOXAPARIN SODIUM 100 MG/ML
40 INJECTION SUBCUTANEOUS DAILY
Status: DISCONTINUED | OUTPATIENT
Start: 2023-02-15 | End: 2023-02-16 | Stop reason: HOSPADM

## 2023-02-14 RX ORDER — SODIUM CHLORIDE 0.9 % (FLUSH) 0.9 %
5-40 SYRINGE (ML) INJECTION AS NEEDED
Status: DISCONTINUED | OUTPATIENT
Start: 2023-02-14 | End: 2023-02-16 | Stop reason: HOSPADM

## 2023-02-14 RX ORDER — ROCURONIUM BROMIDE 10 MG/ML
INJECTION, SOLUTION INTRAVENOUS AS NEEDED
Status: DISCONTINUED | OUTPATIENT
Start: 2023-02-14 | End: 2023-02-14 | Stop reason: HOSPADM

## 2023-02-14 RX ORDER — SODIUM CHLORIDE 0.9 % (FLUSH) 0.9 %
5-40 SYRINGE (ML) INJECTION EVERY 8 HOURS
Status: DISCONTINUED | OUTPATIENT
Start: 2023-02-14 | End: 2023-02-14 | Stop reason: HOSPADM

## 2023-02-14 RX ORDER — ALBUTEROL SULFATE 90 UG/1
AEROSOL, METERED RESPIRATORY (INHALATION) AS NEEDED
Status: DISCONTINUED | OUTPATIENT
Start: 2023-02-14 | End: 2023-02-14 | Stop reason: HOSPADM

## 2023-02-14 RX ORDER — LORAZEPAM 2 MG/ML
1 INJECTION INTRAMUSCULAR ONCE
Status: COMPLETED | OUTPATIENT
Start: 2023-02-14 | End: 2023-02-14

## 2023-02-14 RX ORDER — LIDOCAINE HYDROCHLORIDE ANHYDROUS AND DEXTROSE MONOHYDRATE .8; 5 G/100ML; G/100ML
INJECTION, SOLUTION INTRAVENOUS
Status: DISCONTINUED | OUTPATIENT
Start: 2023-02-14 | End: 2023-02-14 | Stop reason: HOSPADM

## 2023-02-14 RX ORDER — BUPIVACAINE HYDROCHLORIDE AND EPINEPHRINE 2.5; 5 MG/ML; UG/ML
INJECTION, SOLUTION EPIDURAL; INFILTRATION; INTRACAUDAL; PERINEURAL AS NEEDED
Status: DISCONTINUED | OUTPATIENT
Start: 2023-02-14 | End: 2023-02-14 | Stop reason: HOSPADM

## 2023-02-14 RX ORDER — HYDROMORPHONE HYDROCHLORIDE 1 MG/ML
0.2 INJECTION, SOLUTION INTRAMUSCULAR; INTRAVENOUS; SUBCUTANEOUS
Status: DISCONTINUED | OUTPATIENT
Start: 2023-02-14 | End: 2023-02-14 | Stop reason: HOSPADM

## 2023-02-14 RX ORDER — ONDANSETRON 2 MG/ML
4 INJECTION INTRAMUSCULAR; INTRAVENOUS
Status: DISCONTINUED | OUTPATIENT
Start: 2023-02-14 | End: 2023-02-16 | Stop reason: HOSPADM

## 2023-02-14 RX ORDER — DEXAMETHASONE SODIUM PHOSPHATE 4 MG/ML
INJECTION, SOLUTION INTRA-ARTICULAR; INTRALESIONAL; INTRAMUSCULAR; INTRAVENOUS; SOFT TISSUE AS NEEDED
Status: DISCONTINUED | OUTPATIENT
Start: 2023-02-14 | End: 2023-02-14 | Stop reason: HOSPADM

## 2023-02-14 RX ORDER — LIDOCAINE HYDROCHLORIDE 20 MG/ML
INJECTION, SOLUTION EPIDURAL; INFILTRATION; INTRACAUDAL; PERINEURAL AS NEEDED
Status: DISCONTINUED | OUTPATIENT
Start: 2023-02-14 | End: 2023-02-14 | Stop reason: HOSPADM

## 2023-02-14 RX ORDER — KETAMINE HYDROCHLORIDE 50 MG/ML
INJECTION INTRAMUSCULAR; INTRAVENOUS AS NEEDED
Status: DISCONTINUED | OUTPATIENT
Start: 2023-02-14 | End: 2023-02-14 | Stop reason: HOSPADM

## 2023-02-14 RX ORDER — SODIUM CHLORIDE 0.9 % (FLUSH) 0.9 %
5-40 SYRINGE (ML) INJECTION AS NEEDED
Status: DISCONTINUED | OUTPATIENT
Start: 2023-02-14 | End: 2023-02-14 | Stop reason: HOSPADM

## 2023-02-14 RX ORDER — INDOCYANINE GREEN AND WATER 25 MG
KIT INJECTION AS NEEDED
Status: DISCONTINUED | OUTPATIENT
Start: 2023-02-14 | End: 2023-02-14 | Stop reason: HOSPADM

## 2023-02-14 RX ORDER — LEVOFLOXACIN 5 MG/ML
500 INJECTION, SOLUTION INTRAVENOUS ONCE
Status: COMPLETED | OUTPATIENT
Start: 2023-02-14 | End: 2023-02-14

## 2023-02-14 RX ORDER — ONDANSETRON 4 MG/1
4 TABLET, ORALLY DISINTEGRATING ORAL
Status: DISCONTINUED | OUTPATIENT
Start: 2023-02-14 | End: 2023-02-16 | Stop reason: HOSPADM

## 2023-02-14 RX ORDER — SODIUM CHLORIDE 0.9 % (FLUSH) 0.9 %
5-40 SYRINGE (ML) INJECTION EVERY 8 HOURS
Status: DISCONTINUED | OUTPATIENT
Start: 2023-02-14 | End: 2023-02-16 | Stop reason: HOSPADM

## 2023-02-14 RX ORDER — SODIUM CHLORIDE, SODIUM LACTATE, POTASSIUM CHLORIDE, CALCIUM CHLORIDE 600; 310; 30; 20 MG/100ML; MG/100ML; MG/100ML; MG/100ML
75 INJECTION, SOLUTION INTRAVENOUS CONTINUOUS
Status: DISCONTINUED | OUTPATIENT
Start: 2023-02-14 | End: 2023-02-14 | Stop reason: HOSPADM

## 2023-02-14 RX ORDER — SODIUM CHLORIDE, SODIUM LACTATE, POTASSIUM CHLORIDE, CALCIUM CHLORIDE 600; 310; 30; 20 MG/100ML; MG/100ML; MG/100ML; MG/100ML
25 INJECTION, SOLUTION INTRAVENOUS CONTINUOUS
Status: DISCONTINUED | OUTPATIENT
Start: 2023-02-14 | End: 2023-02-14 | Stop reason: HOSPADM

## 2023-02-14 RX ORDER — ACETAMINOPHEN 500 MG
1000 TABLET ORAL ONCE
Status: COMPLETED | OUTPATIENT
Start: 2023-02-14 | End: 2023-02-14

## 2023-02-14 RX ORDER — OXYCODONE HYDROCHLORIDE 5 MG/1
5 TABLET ORAL
Status: DISCONTINUED | OUTPATIENT
Start: 2023-02-14 | End: 2023-02-16 | Stop reason: HOSPADM

## 2023-02-14 RX ORDER — DIPHENHYDRAMINE HYDROCHLORIDE 50 MG/ML
12.5 INJECTION, SOLUTION INTRAMUSCULAR; INTRAVENOUS AS NEEDED
Status: DISCONTINUED | OUTPATIENT
Start: 2023-02-14 | End: 2023-02-14 | Stop reason: HOSPADM

## 2023-02-14 RX ORDER — FENTANYL CITRATE 50 UG/ML
25 INJECTION, SOLUTION INTRAMUSCULAR; INTRAVENOUS
Status: DISCONTINUED | OUTPATIENT
Start: 2023-02-14 | End: 2023-02-14 | Stop reason: HOSPADM

## 2023-02-14 RX ORDER — OXYCODONE HYDROCHLORIDE 5 MG/1
10 TABLET ORAL
Status: DISCONTINUED | OUTPATIENT
Start: 2023-02-14 | End: 2023-02-16 | Stop reason: HOSPADM

## 2023-02-14 RX ORDER — LORAZEPAM 2 MG/ML
INJECTION INTRAMUSCULAR
Status: DISPENSED
Start: 2023-02-14 | End: 2023-02-14

## 2023-02-14 RX ORDER — FENTANYL CITRATE 50 UG/ML
INJECTION, SOLUTION INTRAMUSCULAR; INTRAVENOUS AS NEEDED
Status: DISCONTINUED | OUTPATIENT
Start: 2023-02-14 | End: 2023-02-14 | Stop reason: HOSPADM

## 2023-02-14 RX ORDER — DEXMEDETOMIDINE HYDROCHLORIDE 100 UG/ML
INJECTION, SOLUTION INTRAVENOUS AS NEEDED
Status: DISCONTINUED | OUTPATIENT
Start: 2023-02-14 | End: 2023-02-14 | Stop reason: HOSPADM

## 2023-02-14 RX ORDER — KETOROLAC TROMETHAMINE 30 MG/ML
15 INJECTION, SOLUTION INTRAMUSCULAR; INTRAVENOUS EVERY 6 HOURS
Status: DISCONTINUED | OUTPATIENT
Start: 2023-02-15 | End: 2023-02-16 | Stop reason: HOSPADM

## 2023-02-14 RX ORDER — LIDOCAINE HYDROCHLORIDE 10 MG/ML
0.1 INJECTION, SOLUTION EPIDURAL; INFILTRATION; INTRACAUDAL; PERINEURAL AS NEEDED
Status: DISCONTINUED | OUTPATIENT
Start: 2023-02-14 | End: 2023-02-14 | Stop reason: HOSPADM

## 2023-02-14 RX ORDER — HYDROMORPHONE HYDROCHLORIDE 1 MG/ML
0.5 INJECTION, SOLUTION INTRAMUSCULAR; INTRAVENOUS; SUBCUTANEOUS
Status: DISCONTINUED | OUTPATIENT
Start: 2023-02-14 | End: 2023-02-16 | Stop reason: HOSPADM

## 2023-02-14 RX ADMIN — FENTANYL CITRATE 25 MCG: 50 INJECTION, SOLUTION INTRAMUSCULAR; INTRAVENOUS at 10:41

## 2023-02-14 RX ADMIN — OXYCODONE 10 MG: 5 TABLET ORAL at 12:20

## 2023-02-14 RX ADMIN — Medication 2 G: at 08:01

## 2023-02-14 RX ADMIN — ALBUTEROL SULFATE 6 PUFF: 90 AEROSOL, METERED RESPIRATORY (INHALATION) at 08:40

## 2023-02-14 RX ADMIN — LIDOCAINE HYDROCHLORIDE 2 MG/KG/HR: 8 INJECTION, SOLUTION INTRAVENOUS at 07:45

## 2023-02-14 RX ADMIN — FENTANYL CITRATE 50 MCG: 50 INJECTION, SOLUTION INTRAMUSCULAR; INTRAVENOUS at 07:44

## 2023-02-14 RX ADMIN — SODIUM CHLORIDE, POTASSIUM CHLORIDE, SODIUM LACTATE AND CALCIUM CHLORIDE: 600; 310; 30; 20 INJECTION, SOLUTION INTRAVENOUS at 07:29

## 2023-02-14 RX ADMIN — HYDROMORPHONE HYDROCHLORIDE 0.2 MG: 1 INJECTION, SOLUTION INTRAMUSCULAR; INTRAVENOUS; SUBCUTANEOUS at 11:00

## 2023-02-14 RX ADMIN — SODIUM CHLORIDE, POTASSIUM CHLORIDE, SODIUM LACTATE AND CALCIUM CHLORIDE: 600; 310; 30; 20 INJECTION, SOLUTION INTRAVENOUS at 07:45

## 2023-02-14 RX ADMIN — FENTANYL CITRATE 25 MCG: 50 INJECTION, SOLUTION INTRAMUSCULAR; INTRAVENOUS at 10:32

## 2023-02-14 RX ADMIN — ONDANSETRON HYDROCHLORIDE 4 MG: 2 INJECTION, SOLUTION INTRAMUSCULAR; INTRAVENOUS at 09:37

## 2023-02-14 RX ADMIN — ACETAMINOPHEN 325MG 650 MG: 325 TABLET ORAL at 18:00

## 2023-02-14 RX ADMIN — PROPOFOL 130 MG: 10 INJECTION, EMULSION INTRAVENOUS at 07:36

## 2023-02-14 RX ADMIN — LIDOCAINE HYDROCHLORIDE 100 MG: 20 INJECTION, SOLUTION EPIDURAL; INFILTRATION; INTRACAUDAL; PERINEURAL at 07:36

## 2023-02-14 RX ADMIN — SODIUM CHLORIDE, PRESERVATIVE FREE 10 ML: 5 INJECTION INTRAVENOUS at 21:21

## 2023-02-14 RX ADMIN — SUCCINYLCHOLINE CHLORIDE 160 MG: 20 INJECTION, SOLUTION INTRAMUSCULAR; INTRAVENOUS at 07:37

## 2023-02-14 RX ADMIN — CELECOXIB 200 MG: 200 CAPSULE ORAL at 07:11

## 2023-02-14 RX ADMIN — HYDROMORPHONE HYDROCHLORIDE 0.2 MG: 1 INJECTION, SOLUTION INTRAMUSCULAR; INTRAVENOUS; SUBCUTANEOUS at 10:45

## 2023-02-14 RX ADMIN — METRONIDAZOLE 500 MG: 500 INJECTION, SOLUTION INTRAVENOUS at 07:32

## 2023-02-14 RX ADMIN — WATER 2.5 MG: 1 INJECTION INTRAMUSCULAR; INTRAVENOUS; SUBCUTANEOUS at 08:00

## 2023-02-14 RX ADMIN — INDOCYANINE GREEN 5 MG: KIT INTRAVENOUS at 09:01

## 2023-02-14 RX ADMIN — ROCURONIUM BROMIDE 20 MG: 10 INJECTION INTRAVENOUS at 08:37

## 2023-02-14 RX ADMIN — LEVOFLOXACIN 500 MG: 5 INJECTION, SOLUTION INTRAVENOUS at 07:51

## 2023-02-14 RX ADMIN — SODIUM CHLORIDE, POTASSIUM CHLORIDE, SODIUM LACTATE AND CALCIUM CHLORIDE 75 ML/HR: 600; 310; 30; 20 INJECTION, SOLUTION INTRAVENOUS at 17:03

## 2023-02-14 RX ADMIN — DEXAMETHASONE SODIUM PHOSPHATE 8 MG: 4 INJECTION, SOLUTION INTRAMUSCULAR; INTRAVENOUS at 07:51

## 2023-02-14 RX ADMIN — HYDROMORPHONE HYDROCHLORIDE 0.2 MG: 1 INJECTION, SOLUTION INTRAMUSCULAR; INTRAVENOUS; SUBCUTANEOUS at 11:15

## 2023-02-14 RX ADMIN — HYDROMORPHONE HYDROCHLORIDE 0.5 MG: 1 INJECTION, SOLUTION INTRAMUSCULAR; INTRAVENOUS; SUBCUTANEOUS at 13:46

## 2023-02-14 RX ADMIN — FENTANYL CITRATE 25 MCG: 50 INJECTION, SOLUTION INTRAMUSCULAR; INTRAVENOUS at 10:38

## 2023-02-14 RX ADMIN — Medication 3 AMPULE: at 07:09

## 2023-02-14 RX ADMIN — LORAZEPAM 1 MG: 2 INJECTION INTRAMUSCULAR; INTRAVENOUS at 11:06

## 2023-02-14 RX ADMIN — ALBUTEROL SULFATE 8 PUFF: 90 AEROSOL, METERED RESPIRATORY (INHALATION) at 10:08

## 2023-02-14 RX ADMIN — MIDAZOLAM HYDROCHLORIDE 2 MG: 1 INJECTION, SOLUTION INTRAMUSCULAR; INTRAVENOUS at 07:28

## 2023-02-14 RX ADMIN — ROCURONIUM BROMIDE 40 MG: 10 INJECTION INTRAVENOUS at 07:45

## 2023-02-14 RX ADMIN — SUGAMMADEX 400 MG: 100 INJECTION, SOLUTION INTRAVENOUS at 10:00

## 2023-02-14 RX ADMIN — KETAMINE HYDROCHLORIDE 40 MG: 50 INJECTION, SOLUTION, CONCENTRATE INTRAMUSCULAR; INTRAVENOUS at 07:57

## 2023-02-14 RX ADMIN — ACETAMINOPHEN 325MG 650 MG: 325 TABLET ORAL at 12:20

## 2023-02-14 RX ADMIN — FENTANYL CITRATE 50 MCG: 50 INJECTION, SOLUTION INTRAMUSCULAR; INTRAVENOUS at 07:36

## 2023-02-14 RX ADMIN — DEXMEDETOMIDINE HYDROCHLORIDE 10 MCG: 100 INJECTION, SOLUTION, CONCENTRATE INTRAVENOUS at 09:42

## 2023-02-14 RX ADMIN — SODIUM CHLORIDE, PRESERVATIVE FREE 10 ML: 5 INJECTION INTRAVENOUS at 14:00

## 2023-02-14 RX ADMIN — BACLOFEN 10 MG: 10 TABLET ORAL at 13:46

## 2023-02-14 RX ADMIN — ACETAMINOPHEN 1000 MG: 500 TABLET ORAL at 07:11

## 2023-02-14 RX ADMIN — FENTANYL CITRATE 25 MCG: 50 INJECTION, SOLUTION INTRAMUSCULAR; INTRAVENOUS at 10:35

## 2023-02-14 RX ADMIN — ALBUTEROL SULFATE 8 PUFF: 90 AEROSOL, METERED RESPIRATORY (INHALATION) at 08:18

## 2023-02-14 RX ADMIN — SODIUM CHLORIDE, POTASSIUM CHLORIDE, SODIUM LACTATE AND CALCIUM CHLORIDE 75 ML/HR: 600; 310; 30; 20 INJECTION, SOLUTION INTRAVENOUS at 12:22

## 2023-02-14 NOTE — PERIOP NOTES
1019-Handoff Report from Operating Room to PACU    Report received from [address] and [address] [facility] CRNA regarding Zeinab Devine. Surgeon(s):  MD Sly Gonzalez, Meli Pulido MD  And Procedure(s) (LRB):  LAPAROSCOPIC SIGMOID COLECTOMY WITH INTRAOPERATIVE FLEXIBLE SIGMOIDOSCOPY (ERAS); CYSTOSCOPY WITH INSERTION OF BILATERAL URETERAL CATHETERS (N/A)  CYSTOSCOPY INSERTION URETERAL CATHETERS (Bilateral)  confirmed   with allergies, drains, and dressings discussed. Anesthesia type, drugs, patient history, complications, estimated blood loss, vital signs, intake and output, and last pain medication, lines, reversal medications, and temperature were reviewed. 1138- TRANSFER - OUT REPORT:    Verbal report given to Brit Guo RN(name) on Zeinab Devine  being transferred to surg tele(unit) for routine post - op       Report consisted of patients Situation, Background, Assessment and   Recommendations(SBAR). Information from the following report(s) SBAR, Kardex, OR Summary, Procedure Summary, Intake/Output, MAR, and Recent Results was reviewed with the receiving nurse. Opportunity for questions and clarification was provided.       Patient transported with:   O2 @ 4 liters  Tech

## 2023-02-14 NOTE — BRIEF OP NOTE
Brief Postoperative Note-UROLOGY    Patient: Renetta Beatty  YOB: 1960  MRN: [id number]    Date of Procedure: 2/14/2023     Pre-Op Diagnosis: DIVERTICULITIS WITH ABSCESS    Post-Op Diagnosis: Same as preoperative diagnosis. Procedure(s):    CYSTOSCOPY INSERTION URETERAL CATHETERS-ICG    Surgeon(s):  MD Varun Daniels MD    Surgical Assistant: Surg Asst-1: Keaton Alvarez RN    Anesthesia: General     Estimated Blood Loss (mL): Minimal    Complications: None    Specimens: * No specimens in log *     Implants: * No implants in log *    Drains:       Findings: Bladder WNL.   5 fr spiral ureteral catheters placed   RED=RIGHT   BLUE=LEFT  2cc of 4cc ICG each  dictated    Electronically Signed by Gabby Butler MD on 2/14/2023 at 8:46 AM

## 2023-02-14 NOTE — CONSULTS
Urology Consult    Subjective:     Date of Consultation:  February 14, 2023    Referring Physician: Nilson Scales    Reason for Consultation:  for cysto and ureteral catheters    Patient Name: Jamey Kumar  MRN: [id number]    History of Present Illness:   Patient is a 63 y.o. female who is being seen for above. She was admitted to the hospital for [address] [place]. No stone hx or  surgery. No void issues. Past Medical History:   Diagnosis Date    Diverticulitis     Goiter     treated w/ radioactive iodine    Heart murmur     no cardiologist    High cholesterol 02/08/2023    diet controlled    Infectious disease     liver infection secondary to colon infection r/t diverticulitis    Skin cancer     basal cell on chest, back/shoulder      Past Surgical History:   Procedure Laterality Date    COLONOSCOPY N/A 12/09/2022    COLONOSCOPY performed by Vanessa Christensen MD at [facility] ENDOSCOPY    HX BREAST BIOPSY Left     benign    HX HERNIA REPAIR      HX LIPOMA RESECTION Right     mid abdomen side    HX TONSILLECTOMY      childhood      Family History   Problem Relation Age of Onset    Diabetes Mother     Heart Disease Father     Crohn's Disease Sister     Lung Cancer Brother     Prostate Cancer Brother       Social History     Tobacco Use    Smoking status: Every Day     Packs/day: 0.25     Years: 45.00     Pack years: 11.25     Types: Cigarettes    Smokeless tobacco: Not on file   Substance Use Topics    Alcohol use: Yes     Alcohol/week: 2.0 standard drinks     Types: 2 Drinks containing 0.5 oz of alcohol per week     Comment: 2 mixed drinks per week     Allergies   Allergen Reactions    Cefepime Rash    Erythromycin Other (comments)     \"Chest Pain, like I need to hiccup, but can't\"    Pcn [Penicillins] Rash     Childhood reaction, has taken Keflex    Sulfa (Sulfonamide Antibiotics) Rash      Prior to Admission medications    Medication Sig Start Date End Date Taking?  Authorizing Provider   metronidazole (FLAGYL PO) Take  by mouth. Take @ 0800, 0900 & 1700 the day prior to surgery 23  Yes Provider, Historical   metoclopramide HCl (REGLAN PO) Take  by mouth. Take @ 0600, 1200 & 1700 the day prior to surgery 23  Yes Provider, Historical   sodium chloride/NaHCO3/KCl/peg (NULYTELY PO) Take  by mouth. 23  Yes Provider, Historical   neomycin sulfate (NEOMYCIN PO) Take  by mouth. Take @ 0800, 0900 & 1700 the day prior to surgery 23   Provider, Historical             Objective:     Data Review (Labs):    No results for input(s): WBC, HGB, PLT, NA, K, CREA, BUN, INR, HGBEXT, PLTEXT, INREXT in the last 72 hours. Patient Vitals for the past 8 hrs:   BP Temp Pulse Resp SpO2 Height Weight   23 0706 98/74 98.2 °F (36.8 °C) 69 15 98 % 5' 4\" (1.626 m) 77 kg (169 lb 12.1 oz)     Temp (24hrs), Av.2 °F (36.8 °C), Min:98.2 °F (36.8 °C), Max:98.2 °F (36.8 °C)      Intake and Output:   No intake/output data recorded. Assessment:     Active Problems:    * No active hospital problems. *        For diverticulitis surgery     Plan:      For cysto and ureteral catheters and ICG    Signed By: Julia Richardson MD                         2023

## 2023-02-14 NOTE — BRIEF OP NOTE
Brief Postoperative Note    Patient: Pj Granados  YOB: 1960  MRN: [id number]    Date of Procedure: 2/14/2023     Pre-Op Diagnosis: DIVERTICULITIS WITH ABSCESS    Post-Op Diagnosis: Same as preoperative diagnosis. Procedure(s):  LAPAROSCOPIC SIGMOID COLECTOMY WITH INTRAOPERATIVE FLEXIBLE SIGMOIDOSCOPY (ERAS); CYSTOSCOPY WITH INSERTION OF BILATERAL URETERAL CATHETERS  CYSTOSCOPY INSERTION URETERAL CATHETERS    Surgeon(s):  MD River Ordoñez MD    Surgical Assistant: Surg Asst-1: Lee Fernandez RN    Anesthesia: General     Estimated Blood Loss (mL): 018     Complications: None    Specimens:   ID Type Source Tests Collected by Time Destination   1 : proximal ring Preservative Sigmoid  Aurora Henry MD 2/14/2023 5823 Pathology   2 : distal ring Preservative Sigmoid  Aurora Henry MD 2/14/2023 0848 Pathology   3 : sigmoid colon Preservative Sigmoid  Aurora Henry MD 2/14/2023 0911 Pathology        Implants: * No implants in log *    Drains:   Nephroureteral Drain 02/14/23 Left Ureter (Active)       Findings: Severely inflamed sigmoid colon with normal appearing descending colon and rectum. Pericolic inflammation involving the posterior uterus and left pelvic side wall.     Electronically Signed by Linda Paz MD on 2/14/2023 at 9:52 AM

## 2023-02-14 NOTE — PROGRESS NOTES
Nutrition Note    Chart reviewed; RD provided a 5-day supply of Ensure Surgery/Immunonutrition (10 bottles) to the bedside per ERAS protocol and discussed the importance of adequate nutrition/supplement compliance in effort to optimize wound healing and recovery from surgery. Pt agreeable and reports that she enjoyed the Ensure Surgery pre-op. RD anticipates good compliance.       Electronically signed by Ce Erickson RD, [address]  on 2/14/2023 at 3:43 PM    Contact: ext [phone]

## 2023-02-14 NOTE — OP NOTES
[address]  OPERATIVE REPORT    Name:  Kavon Naik  MR#:  [id number]  :  1960  ACCOUNT #:  [de-identified]  DATE OF SERVICE:  2023    PREOPERATIVE DIAGNOSES:  Diverticulitis and abscess. POSTOPERATIVE DIAGNOSES:  Diverticulitis and abscess. PROCEDURE PERFORMED:  Laparoscopic sigmoid colectomy with flexible sigmoidoscopy. SURGEON:  Arturo Amaya MD    ASSISTANT:  Amari Santacruz. ANESTHESIA:  General.    COMPLICATIONS:  None. SPECIMENS REMOVED:  Sigmoid colon and proximal and distal donuts. IMPLANTS:  None. ESTIMATED BLOOD LOSS:  200 mL. DRAINS:  None. FINDINGS:  Severely inflamed sigmoid colon involving the posterior uterus and left pelvic side wall, normal appearing descending colon and rectum. PROCEDURE:   The patient was brought to the operating suite and placed in the supine position. General endotracheal anesthesia was induced. Venodyne stockings were placed. She was then placed in a low lithotomy position. A cystoscopy and bilateral ureter catheter placement was performed by Dr. Ranjeet Renae. For more details of the procedure, please refer to his operative note. The patient was then placed in a steep Trendelenburg position. The abdomen was prepped and draped in the usual sterile fashion and a time-out was performed indicating the correct patient and procedure to be performed as per hospital protocol. A 12-mm vertical midline incision was made above the umbilicus. The incision was taken down to the fascia. The fascia was incised, and the abdomen was entered. A 12-mm Leslie trocar was placed into the abdominal cavity and the abdomen was insufflated. I then placed two 5-mm trocars in the right lower quadrant and a Pfannenstiel incision was used for the hand port. It was through all of these incisions that the entirety of the dissection was completed.   I initially attempted to perform a straight-stick laparoscopy, however unfortunately due to inflamed  nature of the sigmoid colon, I had to put a hand port and using Pfannenstiel incision. The sigmoid colon was noted to be plastered up against the left pelvic side wall and stuck quite firmly to the posterior wall of the uterus. After a combination of sharp and blunt and dissection, I gently peeled this away from the uterus and left pelvic side wall taking care to avoid any injury to the left ureter which was identified and preserved during this entire dissection. Once this was set free, the mesentery was elevated, identifying the inferior mesenteric pedicle. This was scored along the medial edge overlying the sacral promontory. The rectosigmoid was identified in the area of healthy soft pliable rectum. The mesentery at this level was taken down using a LigaSure. The rectum was divided using a 60-mm [place] robotic green load stapler. The mesentery of the sigmoid colon was taken down the LigaSure. The posterior attachments of the descending colon were freed up to allow for full mobilization along with the lateral attachments along the white line of Toldt. This was taken down all the way up to the splenic flexure. The splenic flexure was taken down in its entirety, taking down the splenocolic and renocolic attachments. The descending colon was fully mobilized to allow excellent reach down into the pelvis. The proximal resection margin was identified at the junction between the descending and sigmoid colon. The mesentery was taken down at this level. Once I had adequate reach for the colon to reach down into the pelvis, I had Anesthesia inject indocyanine green dye to confirm blood supply to the proximal and distal margins. Once that was confirmed, the colon was exteriorized through the hand port. An automatic pursestring device was deployed to the proximal resection margin. The specimen was removed and sent to pathology for further examination.   A 29-EEA anvil was placed in the colotomy. The pursestring suture was cinched down. The fat was cleared off the anvil and a second pursestring suture was placed using 2-0 Prolene. The anvil was then dropped back into the abdominal cavity. The abdomen was reinsufflated, placed sequential dilator transanally up to the transverse staple line followed by the 29 EEA stapler. The spike was deployed. The spike and the anvil were mated. The staple was closed in its entirety and two complete donuts were identified, they were both sent to Pathology for further examination. A flexible sigmoidoscopy was performed using adult video colonoscope. This was inserted transanally up to the anastomosis. The anastomosis was gently insufflated while simultaneously irrigating the pelvis. There were no leaks noted, no bubbles were seen. There was pink healthy mucosa on both sides. There was noted to be a small amount of bleeding along the anastomosis which eventually stopped on its own. With the anastomosis completed, I was ready for closure. The Pfannenstiel incision was closed in layers using 2-0 Vicryl for the peritoneum followed by 0 looped PDS suture for the fascia. The supraumbilical incision was closed with a 0 Vicryl suture using a UR6 needle. All the skin incisions were closed with 4-0 Monocryl followed by Dermabond. A 30 mL of 0.25% Marcaine with epinephrine was injected subcutaneously. The patient was awakened, extubated, and taken to the recovery room in stable condition.         MD FAVIAN Swift/[email or web address]  D:  02/14/2023 9:59  T:  02/14/2023 18:48  JOB #:  [id number]

## 2023-02-14 NOTE — ANESTHESIA POSTPROCEDURE EVALUATION
Procedure(s):  LAPAROSCOPIC SIGMOID COLECTOMY WITH INTRAOPERATIVE FLEXIBLE SIGMOIDOSCOPY (ERAS); CYSTOSCOPY WITH INSERTION OF BILATERAL URETERAL CATHETERS  CYSTOSCOPY INSERTION URETERAL CATHETERS. general    Anesthesia Post Evaluation        Patient location during evaluation: PACU  Note status: Adequate. Level of consciousness: responsive to verbal stimuli and sleepy but conscious  Pain management: satisfactory to patient  Airway patency: patent  Anesthetic complications: no  Cardiovascular status: acceptable  Respiratory status: acceptable  Hydration status: acceptable  Comments: +Post-Anesthesia Evaluation and Assessment    Patient: Kristie Cooper MRN: [id number]  SSN: [id number]   YOB: 1960  Age: 63 y.o. Sex: female      Cardiovascular Function/Vital Signs    /75   Pulse 86   Temp 36.4 °C (97.5 °F)   Resp 12   Ht 5' 4\" (1.626 m)   Wt 77 kg (169 lb 12.1 oz)   SpO2 96%   BMI 29.14 kg/m²     Patient is status post Procedure(s):  LAPAROSCOPIC SIGMOID COLECTOMY WITH INTRAOPERATIVE FLEXIBLE SIGMOIDOSCOPY (ERAS); CYSTOSCOPY WITH INSERTION OF BILATERAL URETERAL CATHETERS  CYSTOSCOPY INSERTION URETERAL CATHETERS. Nausea/Vomiting: Controlled. Postoperative hydration reviewed and adequate. Pain:  Pain Scale 1: Visual (02/14/23 1130)  Pain Intensity 1: 0 (02/14/23 1130)   Managed. Neurological Status:   Neuro (WDL): Exceptions to WDL (02/14/23 1020)   At baseline. Mental Status and Level of Consciousness: Arousable. Pulmonary Status:   O2 Device: Nasal cannula (02/14/23 1130)   Adequate oxygenation and airway patent. Complications related to anesthesia: None    Post-anesthesia assessment completed. No concerns.     Signed By: Antoinette Khalil MD    2/14/2023  Post anesthesia nausea and vomiting:  controlled      INITIAL Post-op Vital signs:   Vitals Value Taken Time   /75 02/14/23 1130   Temp 36.4 °C (97.5 °F) 02/14/23 1019   Pulse 84 02/14/23 1141   Resp 14 02/14/23 1141   SpO2 95 % 02/14/23 1141   Vitals shown include unvalidated device data.

## 2023-02-14 NOTE — OP NOTES
[address]  OPERATIVE REPORT    Name:  Zulay Fofana  MR#:  [id number]  :  1960  ACCOUNT #:  [de-identified]  DATE OF SERVICE:  2023    This is the urology portion of the procedure. PREOPERATIVE DIAGNOSIS:  Diverticulitis. POSTOPERATIVE DIAGNOSIS:  Diverticulitis. PROCEDURE PERFORMED:  Cystoscopy, bilateral ureteral catheter insertion, instillation of ICG. SURGEON:  Faraz Hargrove MD    ASSISTANT:  None. ANESTHESIA:  General.    COMPLICATIONS:  None. SPECIMENS REMOVED:  None. IMPLANTS:  Ureteral catheters and Welsh catheter. ESTIMATED BLOOD LOSS:  Minimal.    INDICATIONS:  The patient is a 63-year-old female. Plans for diverticulitis surgery today with Dr. Juan Antonio Mota. We were requested to place ureteral catheters. PROCEDURE:  The patient was taken to the operating room, then given general anesthesia and placed in a dorsal lithotomy position, and prepped and draped in a standard fashion. A 21-scope was inserted with the camera. Bladder fully inspected. No mucosal lesions noted. A 5-Ind[place]ian spiral ureteral catheters were placed without difficulty. Red on the right. Blue on the left. Each was instilled with 2 mL of 4 mL of ICG contrast per request.  These were secured to the Welsh catheter in a standard fashion. This concludes the urology portion of the case.         Sarika Roy MD JR/[email or web address]  D:  2023 8:50  T:  2023 11:33  JOB #:  [id number]
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
## 2023-02-14 NOTE — ANESTHESIA PREPROCEDURE EVALUATION
Relevant Problems   GASTROINTESTINAL   (+) Liver abscess       Anesthetic History   No history of anesthetic complications            Review of Systems / Medical History  Patient summary reviewed, nursing notes reviewed and pertinent labs reviewed    Pulmonary          Smoker (1/2 ppd smoker)         Neuro/Psych   Within defined limits           Cardiovascular              Hyperlipidemia    Exercise tolerance: >4 METS  Comments: RBBB    ECG (12/17/22):   Normal sinus rhythm   Incomplete right bundle branch block   Minimal voltage criteria for LVH, may be normal variant Nonspecific T wave   abnormality   Cannot rule out Inferior infarct (cited on or before 17-DEC-2022)   When compared with ECG of 17-DEC-2022 22:26,   No significant change was found   GI/Hepatic/Renal           Liver disease (Liver mass/liver abscess secondary to sigmoid abscess)    Comments: Diverticulitis with abscess and related liver abscess - s/p IV antibiotics with PICC line   Endo/Other      Hypothyroidism  Cancer (Hx Basal Cell Carcinoma on chest, back, and shoulder s/p excision)    Comments: Hx Goiter s/p radioactive iodine treament Other Findings   Comments: Drug use: Marijuana         Physical Exam    Airway  Mallampati: III  TM Distance: 4 - 6 cm  Neck ROM: normal range of motion   Mouth opening: Normal     Cardiovascular    Rhythm: regular  Rate: normal    Murmur, Aortic area     Dental    Dentition: Caps/crowns, Upper dentition intact and Lower dentition intact     Pulmonary  Breath sounds clear to auscultation               Abdominal  GI exam deferred       Other Findings            Anesthetic Plan    ASA: 2  Anesthesia type: general    Monitoring Plan: BIS      Induction: Intravenous  Anesthetic plan and risks discussed with: Patient English

## 2023-02-14 NOTE — PROGRESS NOTES
Transition of Care Plan:    RUR:  12%  low   Disposition:  home   If SNF or IPR: Date FOC offered:  Date FOC received:  Date authorization started with reference number:  Date authorization received and expires:  Accepting facility:  Follow up appointments: to be made   DME needed: n/a   Transportation at Discharge: family   [place] or means to access home:      yes  IM Medicare Letter: n/a   Is patient a Watson and connected with the [address]? No    If yes, was [place] transfer form completed and [facility] notified? Caregiver Contact:  Discharge Caregiver contacted prior to discharge? Care Conference needed?:            Reason for Admission:  diverticular abscess                      RUR Score:          12%           Plan for utilizing home health:    tbd       PCP: First and Last name:  Urbano Radford MD     Name of Practice:    Are you a current patient: Yes/No:  yes   Approximate date of last visit:  Jan 2023    Can you participate in a virtual visit with your PCP:  not sure                     Current Advanced Directive/Advance Care Plan: Prior      Healthcare Decision Maker:   Click here to complete [address] including selection of the Healthcare Decision Maker Relationship (ie \"Primary\")             Primary Decision MakerGenaro Coates - [phone]                  Transition of Care Plan: We met in room-  she was pleasant an cooperative. Daughter also in room. Pt is post op. Today was Operative Day. Dx diverticulitis with abscess. She confirmed her home address. She resides with her two sons. She has no DME at home. She has had in past home Iv abx. She cannot recall company name at this time and her PICC has been pulled. She currently has a pop. She confirms her insurance. She has prescription coverage. She is not a Watson. She has stairs to her bedroom which she navigates fine. She drives.       She saw her PCP Dr Boubacar Hernandez in Dec 2022 per daughter. She has had no psych hospitalizations. Will follow as needed. Anticipate home dc.               Patsy Franklin RN  144 pm   Care Manager

## 2023-02-15 LAB
ANION GAP SERPL CALC-SCNC: 8 MMOL/L (ref 5–15)
BASOPHILS # BLD: 0 K/UL (ref 0–0.1)
BASOPHILS NFR BLD: 0 % (ref 0–1)
BUN SERPL-MCNC: 10 MG/DL (ref 6–20)
BUN/CREAT SERPL: 14 (ref 12–20)
CALCIUM SERPL-MCNC: 9 MG/DL (ref 8.5–10.1)
CHLORIDE SERPL-SCNC: 108 MMOL/L (ref 97–108)
CO2 SERPL-SCNC: 24 MMOL/L (ref 21–32)
CREAT SERPL-MCNC: 0.7 MG/DL (ref 0.55–1.02)
DIFFERENTIAL METHOD BLD: ABNORMAL
EOSINOPHIL # BLD: 0 K/UL (ref 0–0.4)
EOSINOPHIL NFR BLD: 0 % (ref 0–7)
ERYTHROCYTE [DISTWIDTH] IN BLOOD BY AUTOMATED COUNT: 15.2 % (ref 11.5–14.5)
GLUCOSE SERPL-MCNC: 108 MG/DL (ref 65–100)
HCT VFR BLD AUTO: 36.7 % (ref 35–47)
HGB BLD-MCNC: 11.6 G/DL (ref 11.5–16)
IMM GRANULOCYTES # BLD AUTO: 0 K/UL (ref 0–0.04)
IMM GRANULOCYTES NFR BLD AUTO: 0 % (ref 0–0.5)
LYMPHOCYTES # BLD: 2.2 K/UL (ref 0.8–3.5)
LYMPHOCYTES NFR BLD: 15 % (ref 12–49)
MCH RBC QN AUTO: 29 PG (ref 26–34)
MCHC RBC AUTO-ENTMCNC: 31.6 G/DL (ref 30–36.5)
MCV RBC AUTO: 91.8 FL (ref 80–99)
MONOCYTES # BLD: 1.1 K/UL (ref 0–1)
MONOCYTES NFR BLD: 7 % (ref 5–13)
NEUTS SEG # BLD: 11.6 K/UL (ref 1.8–8)
NEUTS SEG NFR BLD: 78 % (ref 32–75)
NRBC # BLD: 0 K/UL (ref 0–0.01)
NRBC BLD-RTO: 0 PER 100 WBC
PLATELET # BLD AUTO: 340 K/UL (ref 150–400)
PMV BLD AUTO: 10.1 FL (ref 8.9–12.9)
POTASSIUM SERPL-SCNC: 3.9 MMOL/L (ref 3.5–5.1)
RBC # BLD AUTO: 4 M/UL (ref 3.8–5.2)
SODIUM SERPL-SCNC: 140 MMOL/L (ref 136–145)
WBC # BLD AUTO: 14.9 K/UL (ref 3.6–11)

## 2023-02-15 PROCEDURE — 80048 BASIC METABOLIC PNL TOTAL CA: CPT

## 2023-02-15 PROCEDURE — 74011000250 HC RX REV CODE- 250: Performed by: SURGERY

## 2023-02-15 PROCEDURE — 74011250636 HC RX REV CODE- 250/636: Performed by: SURGERY

## 2023-02-15 PROCEDURE — 94760 N-INVAS EAR/PLS OXIMETRY 1: CPT

## 2023-02-15 PROCEDURE — 65270000029 HC RM PRIVATE

## 2023-02-15 PROCEDURE — 74011250637 HC RX REV CODE- 250/637: Performed by: SURGERY

## 2023-02-15 PROCEDURE — 85025 COMPLETE CBC W/AUTO DIFF WBC: CPT

## 2023-02-15 PROCEDURE — 36415 COLL VENOUS BLD VENIPUNCTURE: CPT

## 2023-02-15 RX ADMIN — OXYCODONE 5 MG: 5 TABLET ORAL at 09:41

## 2023-02-15 RX ADMIN — KETOROLAC TROMETHAMINE 15 MG: 30 INJECTION, SOLUTION INTRAMUSCULAR; INTRAVENOUS at 22:23

## 2023-02-15 RX ADMIN — SODIUM CHLORIDE, POTASSIUM CHLORIDE, SODIUM LACTATE AND CALCIUM CHLORIDE 75 ML/HR: 600; 310; 30; 20 INJECTION, SOLUTION INTRAVENOUS at 06:09

## 2023-02-15 RX ADMIN — ACETAMINOPHEN 325MG 650 MG: 325 TABLET ORAL at 00:47

## 2023-02-15 RX ADMIN — ENOXAPARIN SODIUM 40 MG: 100 INJECTION SUBCUTANEOUS at 09:37

## 2023-02-15 RX ADMIN — ACETAMINOPHEN 325MG 650 MG: 325 TABLET ORAL at 18:49

## 2023-02-15 RX ADMIN — ACETAMINOPHEN 325MG 650 MG: 325 TABLET ORAL at 06:09

## 2023-02-15 RX ADMIN — KETOROLAC TROMETHAMINE 15 MG: 30 INJECTION, SOLUTION INTRAMUSCULAR; INTRAVENOUS at 17:13

## 2023-02-15 RX ADMIN — ACETAMINOPHEN 325MG 650 MG: 325 TABLET ORAL at 14:46

## 2023-02-15 RX ADMIN — SODIUM CHLORIDE, PRESERVATIVE FREE 10 ML: 5 INJECTION INTRAVENOUS at 14:00

## 2023-02-15 RX ADMIN — SODIUM CHLORIDE, PRESERVATIVE FREE 10 ML: 5 INJECTION INTRAVENOUS at 22:23

## 2023-02-15 RX ADMIN — SODIUM CHLORIDE, PRESERVATIVE FREE 10 ML: 5 INJECTION INTRAVENOUS at 06:10

## 2023-02-15 NOTE — PROGRESS NOTES
Surgery NP Progress Note    Sharonda Villalobos  [id number]  female  63 y.o.  1960    s/p LAPAROSCOPIC SIGMOID COLECTOMY WITH INTRAOPERATIVE FLEXIBLE SIGMOIDOSCOPY (ERAS); CYSTOSCOPY WITH INSERTION OF BILATERAL URETERAL CATHETERS on 2023   Pt seen with Dr. Allison Guaman      Assessment:   Active Problems:    Diverticulitis (2023)        Expected post-op progress. Plan/Recommendations/Medical Decision Making:     - Mobilize with nursing and OOB to chair for meals  - Continue diet  - Pain management- Continue current pain control methods.   - VTE Prophylaxis: Lovenox   - Await return of bowel function. Likely d/c in 1-2 days. Subjective:     Patient tolerating diet, no bowel function yet. She is able to void following cath removal yesterday. Objective:     Blood pressure (!) 127/90, pulse 92, temperature 98.2 °F (36.8 °C), resp. rate 17, height 5' 4\" (1.626 m), weight 77 kg (169 lb 12.1 oz), SpO2 96 %. Temp (24hrs), Av °F (36.7 °C), Min:97.6 °F (36.4 °C), Max:98.2 °F (36.8 °C)      Pt resting in chair. NAD   Incisions CDI. Abd soft and appropriately tender. SCDs for mechanical DVT proph while in bed     Body mass index is 29.14 kg/m². Reference: BMI greater than 30 is classified as obesity and greater than 40 is classified as morbid obesity.      Last 3 Recorded Weights in this Encounter    23 0706   Weight: 77 kg (169 lb 12.1 oz)         Gurinder Talley, WARD   MSN, APRN, FNP-C, CWOCN-AP, RNFA    02/15/23

## 2023-02-15 NOTE — PROGRESS NOTES
End of Shift Note    Bedside shift change report given to Harmeet [address]iel[address]  (oncoming nurse) by Volodymyr Woods LPN (offgoing nurse). Report included the following information SBAR, Intake/Output, MAR, Accordion, Recent Results, and Med Rec Status    Shift worked:  7p-7a     Shift summary and any significant changes:    No complaints of pain. Pt ambulated to bathroom x6. Trocar sites on abd is clean, dry and intact. Concerns for physician to address: none     Zone phone for oncoming shift:  [phone]       Activity:  Activity Level: Up ad parris  Number times ambulated in hallways past shift: 0  Number of times OOB to chair past shift: 0    Cardiac:   Cardiac Monitoring: No      Cardiac Rhythm: Sinus Rhythm    Access:  Current line(s): PIV     Genitourinary:   Urinary status: voiding    Respiratory:   O2 Device: None (Room air)  Chronic home O2 use?: NO  Incentive spirometer at bedside: NO       GI:  Last Bowel Movement Date: 02/14/23  Current diet:  ADULT DIET Regular; Low Fiber  ADULT ORAL NUTRITION SUPPLEMENT Breakfast, Dinner;  Other Supplement; ERAS ONS 5 days supply dropped off  Passing flatus: YES  Tolerating current diet: YES       Pain Management:   Patient states pain is manageable on current regimen: YES    Skin:  Gurmeet Score: 22  Interventions: increase time out of bed    Patient Safety:  Fall Score:    Interventions: gripper socks and pt to call before getting OOB       Length of Stay:  Expected LOS: - - -  Actual LOS: [facility]

## 2023-02-15 NOTE — PROGRESS NOTES
End of Shift Note    Bedside shift change report given to Will Mejia (oncoming nurse) by Rama Lyon RN (offgoing nurse).   Report included the following information SBAR and Kardex    Shift worked:  0590-7135     Shift summary and any significant changes:    - Admitted to unit without incident.  - pop removed by NP d/t discomfort; has voided since removal     Concerns for physician to address: none     Zone phone for oncoming shift:

## 2023-02-16 VITALS
RESPIRATION RATE: 16 BRPM | WEIGHT: 169.75 LBS | BODY MASS INDEX: 28.98 KG/M2 | HEIGHT: 64 IN | HEART RATE: 87 BPM | OXYGEN SATURATION: 97 % | SYSTOLIC BLOOD PRESSURE: 130 MMHG | TEMPERATURE: 98.1 F | DIASTOLIC BLOOD PRESSURE: 89 MMHG

## 2023-02-16 LAB
ANION GAP SERPL CALC-SCNC: 8 MMOL/L (ref 5–15)
BASOPHILS # BLD: 0.1 K/UL (ref 0–0.1)
BASOPHILS NFR BLD: 1 % (ref 0–1)
BUN SERPL-MCNC: 11 MG/DL (ref 6–20)
BUN/CREAT SERPL: 16 (ref 12–20)
CALCIUM SERPL-MCNC: 9 MG/DL (ref 8.5–10.1)
CHLORIDE SERPL-SCNC: 107 MMOL/L (ref 97–108)
CO2 SERPL-SCNC: 27 MMOL/L (ref 21–32)
CREAT SERPL-MCNC: 0.69 MG/DL (ref 0.55–1.02)
DIFFERENTIAL METHOD BLD: ABNORMAL
EOSINOPHIL # BLD: 0.2 K/UL (ref 0–0.4)
EOSINOPHIL NFR BLD: 2 % (ref 0–7)
ERYTHROCYTE [DISTWIDTH] IN BLOOD BY AUTOMATED COUNT: 15 % (ref 11.5–14.5)
GLUCOSE SERPL-MCNC: 103 MG/DL (ref 65–100)
HCT VFR BLD AUTO: 36 % (ref 35–47)
HGB BLD-MCNC: 11.6 G/DL (ref 11.5–16)
IMM GRANULOCYTES # BLD AUTO: 0 K/UL (ref 0–0.04)
IMM GRANULOCYTES NFR BLD AUTO: 0 % (ref 0–0.5)
LYMPHOCYTES # BLD: 1.9 K/UL (ref 0.8–3.5)
LYMPHOCYTES NFR BLD: 20 % (ref 12–49)
MCH RBC QN AUTO: 29.7 PG (ref 26–34)
MCHC RBC AUTO-ENTMCNC: 32.2 G/DL (ref 30–36.5)
MCV RBC AUTO: 92.1 FL (ref 80–99)
MONOCYTES # BLD: 0.9 K/UL (ref 0–1)
MONOCYTES NFR BLD: 9 % (ref 5–13)
NEUTS SEG # BLD: 6.8 K/UL (ref 1.8–8)
NEUTS SEG NFR BLD: 68 % (ref 32–75)
NRBC # BLD: 0 K/UL (ref 0–0.01)
NRBC BLD-RTO: 0 PER 100 WBC
PLATELET # BLD AUTO: 282 K/UL (ref 150–400)
PMV BLD AUTO: 10.3 FL (ref 8.9–12.9)
POTASSIUM SERPL-SCNC: 3.4 MMOL/L (ref 3.5–5.1)
RBC # BLD AUTO: 3.91 M/UL (ref 3.8–5.2)
SODIUM SERPL-SCNC: 142 MMOL/L (ref 136–145)
WBC # BLD AUTO: 9.8 K/UL (ref 3.6–11)

## 2023-02-16 PROCEDURE — 94760 N-INVAS EAR/PLS OXIMETRY 1: CPT

## 2023-02-16 PROCEDURE — 74011250637 HC RX REV CODE- 250/637: Performed by: SURGERY

## 2023-02-16 PROCEDURE — 74011000250 HC RX REV CODE- 250: Performed by: SURGERY

## 2023-02-16 PROCEDURE — 36415 COLL VENOUS BLD VENIPUNCTURE: CPT

## 2023-02-16 PROCEDURE — 85025 COMPLETE CBC W/AUTO DIFF WBC: CPT

## 2023-02-16 PROCEDURE — 74011250636 HC RX REV CODE- 250/636: Performed by: SURGERY

## 2023-02-16 PROCEDURE — 80048 BASIC METABOLIC PNL TOTAL CA: CPT

## 2023-02-16 RX ORDER — ACETAMINOPHEN 325 MG/1
650 TABLET ORAL EVERY 6 HOURS
Qty: 56 TABLET | Refills: 0 | Status: SHIPPED | OUTPATIENT
Start: 2023-02-16 | End: 2023-02-23

## 2023-02-16 RX ORDER — OXYCODONE HYDROCHLORIDE 5 MG/1
5 TABLET ORAL
Qty: 12 TABLET | Refills: 0 | Status: SHIPPED | OUTPATIENT
Start: 2023-02-16 | End: 2023-02-19

## 2023-02-16 RX ADMIN — ACETAMINOPHEN 325MG 650 MG: 325 TABLET ORAL at 06:15

## 2023-02-16 RX ADMIN — SODIUM CHLORIDE, PRESERVATIVE FREE 10 ML: 5 INJECTION INTRAVENOUS at 06:14

## 2023-02-16 RX ADMIN — ENOXAPARIN SODIUM 40 MG: 100 INJECTION SUBCUTANEOUS at 09:00

## 2023-02-16 RX ADMIN — KETOROLAC TROMETHAMINE 15 MG: 30 INJECTION, SOLUTION INTRAMUSCULAR; INTRAVENOUS at 06:15

## 2023-02-16 RX ADMIN — ACETAMINOPHEN 325MG 650 MG: 325 TABLET ORAL at 13:24

## 2023-02-16 RX ADMIN — ACETAMINOPHEN 325MG 650 MG: 325 TABLET ORAL at 01:00

## 2023-02-16 RX ADMIN — KETOROLAC TROMETHAMINE 15 MG: 30 INJECTION, SOLUTION INTRAMUSCULAR; INTRAVENOUS at 11:41

## 2023-02-16 NOTE — PROGRESS NOTES
End of Shift Note    Bedside shift change report given to [facility] RN (oncoming nurse) by Joni Mckeon LPN (offgoing nurse). Report included the following information SBAR, Intake/Output, MAR, Accordion, Recent Results, and Med Rec Status    Shift worked:  7p-7a     Shift summary and any significant changes:    No complaints of pain d/t schedule medications. Pt ambulated to bathroom x6. Trocar sites are clean, dry and intact. Concerns for physician to address:  3.4      [facility] phone for oncoming shift:  [phone]       Activity:  Activity Level: Up ad parris  Number times ambulated in hallways past shift: 0  Number of times OOB to chair past shift: 1    Cardiac:   Cardiac Monitoring: No      Cardiac Rhythm: Sinus Rhythm    Access:  Current line(s): PIV     Genitourinary:   Urinary status: voiding    Respiratory:   O2 Device: None (Room air)  Chronic home O2 use?: NO  Incentive spirometer at bedside: N/A       GI:  Last Bowel Movement Date: 02/14/23 (small acorn size BM)  Current diet:  ADULT DIET Regular; Low Fiber  ADULT ORAL NUTRITION SUPPLEMENT Breakfast, Dinner;  Other Supplement; [facility]AS ONS 5 days supply dropped off  Passing flatus: YES  Tolerating current diet: YES       Pain Management:   Patient states pain is manageable on current regimen: YES    Skin:  Gurmeet Score: 22  Interventions: increase time out of bed    Patient Safety:  Fall Score:    Interventions: gripper socks and pt to call before getting OOB       Length of Stay:  Expected LOS: 3d 0h  Actual LOS: [address] [facility] [place], LPN

## 2023-02-16 NOTE — DISCHARGE SUMMARY
Post- Surgical Discharge Summary    Patient ID:  Randell Gamino  [id number]  female  63 y.o.  1960    Admit date: 2/14/2023    Discharge date: 02/16/23     Admitting Physician: William Dias MD     Consulting Physician(s):   Treatment Team: Attending Provider: Alfreda Oleary MD; Care Manager: Vance Latham RN; Utilization Review: Hector Meyer; Primary Nurse: Frandy Arreguin LPN; Primary Nurse: Gertrudis Serrano RN    Date of Surgery:   2/14/2023     Preoperative Diagnosis:  DIVERTICULITIS WITH ABSCESS    Postoperative Diagnosis:   DIVERTICULITIS WITH ABSCESS    Procedure(s):  LAPAROSCOPIC SIGMOID COLECTOMY WITH INTRAOPERATIVE FLEXIBLE SIGMOIDOSCOPY (ERAS); CYSTOSCOPY WITH INSERTION OF BILATERAL URETERAL CATHETERS     Anesthesia Type:   General     Surgeon: Alfreda Oleary MD                            HPI:  Pt is a 63 y.o. female who has a history of [address] who presents at this time for a sigmoid colectomy following the failure of conservative management. Problem List:   Problem List as of 2/16/2023 Date Reviewed: 2/14/2023            Codes Class Noted - Resolved    Diverticulitis ICD-10-CM: G51.38  ICD-9-CM: 562.11  2/14/2023 - Present        Liver abscess ICD-10-CM: K75.0  ICD-9-CM: 572.0  12/16/2022 - Present        Influenza-like illness ICD-10-CM: J11.1  ICD-9-CM: 487.1  12/7/2022 - Present        Liver mass ICD-10-CM: R16.0  ICD-9-CM: 573.8  12/7/2022 - Present            Hospital Course: The patient underwent surgery. Intra-operative complications: None; patient tolerated the procedure well. Was taken to the PACU in stable condition and then transferred to the surgical floor. Pathology is final. No additional intervention needed.      Perioperative Antibiotics: Levaquin and Metronidazole    Postoperative Pain Management:  Oxycodone     Postoperative transfusions:    Number of units banked PRBCs =   none     Post Op complications: None     Incisions  - clean, dry and intact. No significant erythema or swelling. Wound(s) appear to be healing without any evidence of infection. Patient mobilized with nursing and was found to be safe and steady with ambulation. Discharged to: Home     Condition on Discharge: Stable     Discharge instructions:    - Take pain medications as prescribed  - Diet Low Fiber  - Discharge activity:    - Activity as tolerated    - Ambulate several times a day   - No heavy lifting for 4 weeks   - Do not drive for two weeks or while on opioid pain medications  - Wound Care: Keep wound(s) clean and dry. See discharge instruction sheet. Allergies: Allergies   Allergen Reactions    Cefepime Rash    Erythromycin Other (comments)     \"Chest Pain, like I need to hiccup, but can't\"    Pcn [Penicillins] Rash     Childhood reaction, has taken Keflex    Sulfa (Sulfonamide Antibiotics) Rash              -DISCHARGE MEDICATION LIST     Current Discharge Medication List        START taking these medications    Details   acetaminophen (TYLENOL) 325 mg tablet Take 2 Tablets by mouth every six (6) hours for 7 days. Qty: 56 Tablet, Refills: 0  Start date: 2/16/2023, End date: 2/23/2023      oxyCODONE IR (ROXICODONE) 5 mg immediate release tablet Take 1 Tablet by mouth every six (6) hours as needed (Pain Moderate (4-6)) for up to 3 days. Max Daily Amount: 20 mg.  Qty: 12 Tablet, Refills: 0  Start date: 2/16/2023, End date: 2/19/2023    Associated Diagnoses: Diverticulitis           STOP taking these medications       metronidazole (FLAGYL PO) Comments:   Reason for Stopping:         metoclopramide HCl (REGLAN PO) Comments:   Reason for Stopping:         sodium chloride/NaHCO3/KCl/peg (NULYTELY PO) Comments:   Reason for Stopping:         neomycin sulfate (NEOMYCIN PO) Comments:   Reason for Stopping:            per medical continuation form      -Follow up in office in 2 weeks      Signed:  Kristen Slater.  Sierra Chou  MSN, APRN, FNP-C, [employer]  Surgical Nurse Practitioner    2/16/2023  8:36 AM

## 2023-02-16 NOTE — DISCHARGE INSTRUCTIONS
Jean-Paul Segura MD, [address] [facility] [address] Suri Peterson MD, [address] [facility] Jermain Virk MD, FACS  Darleen Garcia. Chiquis Kirkpatrick MD, MD Adelina Rehman MD Rob Charter, MD      Discharge Instructions for [facility] Colorectal Surgery Patients       Do not lift any objects weighing more than 10 pounds for 4 weeks. Do not do any housework including vacuuming, scrubbing or yardwork for 4 weeks. Do not drive for two weeks or while taking sedating medications. You may walk as desired and go up and down stairs as needed. You may shower. Do not take tub baths, swim or use hot tubs for 4 weeks. Leave steri-strips on incision. They will fall off on their own. You may have dermabond on your incisions which is surgical glue. This will dissolve over time. Do not scrub around incisions. Follow low-fiber diet for 2 weeks. (See handbook for additional details). Drink nutritional supplements 2 times per day until your diet and appetite are back to normal. Diabetic patients should drink one-half bottle 4 times daily. Multiple bowel movements are normal each day. Contact your surgeons office for any concerns. Take Acetaminophen 1000mg every 6 hours for 24 hours, then as needed for pain, Ibuprofen 200-400 mg every 8 hours as needed for pain , and Oxycodone (per prescription instructions)    Follow up with providers as scheduled. If your surgery involves an ostomy bag, please bring your supplies to the first office visit with your surgeon. If you experience fever (greater than 100.5), chills, vomiting or redness or drainage at surgical site, please contact your surgeons office. For questions regarding quality or quantity of ostomy output, refer to [facility] handbook or call surgeons office. Please see handbook for additional instructions. If you have further questions, please call your surgeons office.

## 2023-02-16 NOTE — PROGRESS NOTES
Per am rounds-  she will be discharging today. I called her PCP they want her to call and make appt-  I relayed this to patient.     Tommie Gongora, RN  4447 am   2-

## 2024-01-05 NOTE — PROGRESS NOTES
Call attempt made, no answer.    Please see E-advice encounter dated 12/26/23   Problem: Pain  Goal: *Control of Pain  Outcome: Progressing Towards Goal     Problem: Patient Education: Go to Patient Education Activity  Goal: Patient/Family Education  Outcome: Progressing Towards Goal

## 2024-10-14 NOTE — PROGRESS NOTES
End of Shift Note    Bedside shift change report given to Koko Keller (oncoming nurse) by Fredis Rawls RN (offgoing nurse). Report included the following information SBAR, Kardex, MAR, Recent Results, and Cardiac Rhythm NSR    Shift worked:  Night     Shift summary and any significant changes: The pt slept little as she had frequent small BM. C/o feeling hot but her temp was within normal range. Critical lab results of Potassium of 2.2 & getting replacement as well as A/biotic as ordered. Concerns for physician to address:       [facility] phone for oncoming shift:          Activity:  Activity Level: Bed Rest, Bath Room Privileges  Number times ambulated in hallways past shift: 0  Number of times OOB to chair past shift: 4    Cardiac:   Cardiac Monitoring: No      Cardiac Rhythm: Sinus Rhythm    Access:  Current line(s): PIV     Genitourinary:   Urinary status: voiding    Respiratory:   O2 Device: None (Room air)  Chronic home O2 use?: NO  Incentive spirometer at bedside: NO       GI:  Last Bowel Movement Date: 12/07/22  Current diet:  DIET NPO  ADULT DIET Clear Liquid  Passing flatus: YES  Tolerating current diet: YES       Pain Management:   Patient states pain is manageable on current regimen: YES    Skin:  Gurmeet Score: 19  Interventions: increase time out of bed    Patient Safety:  Fall Score:  Total Score: 2  Interventions: gripper socks and pt to call before getting OOB       Length of Stay:  Expected LOS: 2d 14h  Actual LOS: [address], RN Prior to immunization administration, verified patients identity using patient s name and date of birth. Please see Immunization Activity for additional information.     Screening Questionnaire for Adult Immunization    Are you sick today?   No   Do you have allergies to medications, food, a vaccine component or latex?   No   Have you ever had a serious reaction after receiving a vaccination?   No   Do you have a long-term health problem with heart, lung, kidney, or metabolic disease (e.g., diabetes), asthma, a blood disorder, no spleen, complement component deficiency, a cochlear implant, or a spinal fluid leak?  Are you on long-term aspirin therapy?   No   Do you have cancer, leukemia, HIV/AIDS, or any other immune system problem?   No   Do you have a parent, brother, or sister with an immune system problem?   No   In the past 3 months, have you taken medications that affect  your immune system, such as prednisone, other steroids, or anticancer drugs; drugs for the treatment of rheumatoid arthritis, Crohn s disease, or psoriasis; or have you had radiation treatments?   No   Have you had a seizure, or a brain or other nervous system problem?   No   During the past year, have you received a transfusion of blood or blood    products, or been given immune (gamma) globulin or antiviral drug?   No   For women: Are you pregnant or is there a chance you could become       pregnant during the next month?   No   Have you received any vaccinations in the past 4 weeks?   No     Immunization questionnaire answers were all negative.      Patient instructed to remain in clinic for 15 minutes afterwards, and to report any adverse reactions.     Screening performed by Jamie Rios MA on 10/14/2024 at 4:15 PM.

## (undated) DEVICE — TRAP,MUCUS SPECIMEN, 80CC: Brand: MEDLINE

## (undated) DEVICE — TROCAR: Brand: KII FIOS FIRST ENTRY

## (undated) DEVICE — SPONGE GZ W4XL4IN COT 12 PLY TYP VII WVN C FLD DSGN STERILE

## (undated) DEVICE — SYR 10ML LUER LOK 1/5ML GRAD --

## (undated) DEVICE — SYRINGE 20ML LL S/C 50

## (undated) DEVICE — GRASPER ENDOSCP TIP L10MM ANVIL ROT RATCH HNDL DISP

## (undated) DEVICE — YANKAUER,POOLE TIP,STERILE,50/CS: Brand: MEDLINE

## (undated) DEVICE — STAPLER INT DIA29MM CLS STPL H1.5-2.2MM OPN LEG L5.2MM 26

## (undated) DEVICE — NON-REM POLYHESIVE PATIENT RETURN ELECTRODE: Brand: VALLEYLAB

## (undated) DEVICE — GLOVE SURG SZ 75 L12IN FNGR THK79MIL GRN LTX FREE

## (undated) DEVICE — JELLY,LUBE,STERILE,FLIP TOP,TUBE,4-OZ: Brand: MEDLINE

## (undated) DEVICE — ACCESS PLATFORM FOR MINIMALLY INVASIVE SURGERY: Brand: GELPOINT®  MINI ADVANCED ACCESS PLATFORM

## (undated) DEVICE — SPONGE LAPAROTOMY W18XL18IN WHITE STRUNG RADIOPAQUE STERILE

## (undated) DEVICE — SUTURE VCRL SZ 3-0 L27IN ABSRB UD L26MM SH 1/2 CIR J416H

## (undated) DEVICE — CYSTO MRMC: Brand: MEDLINE INDUSTRIES, INC.

## (undated) DEVICE — Device

## (undated) DEVICE — TOTAL TRAY, 16FR 10ML SIL FOLEY, URN: Brand: MEDLINE

## (undated) DEVICE — TOWEL 4 PLY TISS 19X30 SUE WHT

## (undated) DEVICE — GENERAL LAPAROSCOPY-MRMC: Brand: MEDLINE INDUSTRIES, INC.

## (undated) DEVICE — SUTURE PDS II SZ 0 L60IN ABSRB VLT L48MM CTX 1/2 CIR Z990G

## (undated) DEVICE — HYPODERMIC SAFETY NEEDLE: Brand: MAGELLAN

## (undated) DEVICE — SOLIDIFIER FLD 2OZ 1500CC N DISINF IN BTL DISP SAFESORB

## (undated) DEVICE — CANISTER, RIGID, 3000CC: Brand: MEDLINE INDUSTRIES, INC.

## (undated) DEVICE — SET ADMIN 16ML TBNG L100IN 2 Y INJ SITE IV PIGGY BK DISP (ORDER IN MULIPLES OF 48)

## (undated) DEVICE — SYR 3ML LL TIP 1/10ML GRAD --

## (undated) DEVICE — SYRINGE IRRIG 60ML SFT PLIABLE BLB EZ TO GRP 1 HND USE W/

## (undated) DEVICE — GDWIRE URET STR STD .035X150 -- ZIPWIRE STD

## (undated) DEVICE — 40580 - THE PINK PAD - ADVANCED TRENDELENBURG POSITIONING KIT: Brand: 40580 - THE PINK PAD - ADVANCED TRENDELENBURG POSITIONING KIT

## (undated) DEVICE — TOWEL OR BL STR 4/PK -- MEDICHOICE - MEDLINE

## (undated) DEVICE — ELECTRODE,RADIOTRANSLUCENT,FOAM,5PK: Brand: MEDLINE

## (undated) DEVICE — DRAPE,ROBOTICS,STERILE: Brand: MEDLINE

## (undated) DEVICE — 1200 GUARD II KIT W/5MM TUBE W/O VAC TUBE: Brand: GUARDIAN

## (undated) DEVICE — OPEN-END URETERAL CATHETER: Brand: COOK

## (undated) DEVICE — SUTURE SZ 0 27IN 5/8 CIR UR-6  TAPER PT VIOLET ABSRB VICRYL J603H

## (undated) DEVICE — SOLUTION IRRIG 1000ML 0.9% SOD CHL USP POUR PLAS BTL

## (undated) DEVICE — SUTURE MCRYL SZ 4-0 L27IN ABSRB UD L19MM PS-2 1/2 CIR PRIM Y426H

## (undated) DEVICE — CATH IV AUTOGRD BC PNK 20GA 25 -- INSYTE

## (undated) DEVICE — GLOVE ORANGE PI 7   MSG9070

## (undated) DEVICE — TROCARS: Brand: KII® BALLOON BLUNT TIP SYSTEM

## (undated) DEVICE — VISUALIZATION SYSTEM: Brand: CLEARIFY

## (undated) DEVICE — PURSE STRING DEVICE: Brand: PURSTRING

## (undated) DEVICE — SOL IRR STRL H2O 1000ML BG LF --

## (undated) DEVICE — GOWN,SIRUS,FABRNF,XL,20/CS: Brand: MEDLINE

## (undated) DEVICE — SUTURE PROL SZ 2-0 L36IN NONABSORBABLE BLU SH L26MM 1/2 CIR 8523H

## (undated) DEVICE — STAPLER INT L34CM 60MM LNG ENDOSCP ARTC PWR + ECHELON FLX

## (undated) DEVICE — SOLUTION IRRIG 1000ML STRL H2O USP PLAS POUR BTL

## (undated) DEVICE — TUBING, SUCTION, 1/4" X 10', STRAIGHT: Brand: MEDLINE

## (undated) DEVICE — SUTURE VCRL SZ 2-0 L27IN ABSRB UD L26MM SH 1/2 CIR J417H

## (undated) DEVICE — STRAINER URIN CALC RNL MSH -- CONVERT TO ITEM 357634

## (undated) DEVICE — CONTAINER SPEC 20 ML LID NEUT BUFF FORMALIN 10 % POLYPR STS

## (undated) DEVICE — BASIN EMSIS 16OZ GRAPHITE PLAS KID SHP MOLD GRAD FOR ORAL

## (undated) DEVICE — SEALER LAP L37CM MARYLAND JAW OPN NANO COAT MULTIFUNCTIONAL

## (undated) DEVICE — Z DISCONTINUED PER MEDLINE LINE GAS SAMPLING O2/CO2 LNG AD 13 FT NSL W/ TBNG FILTERLINE

## (undated) DEVICE — NEONATAL-ADULT SPO2 SENSOR: Brand: NELLCOR

## (undated) DEVICE — 3M™ IOBAN™ 2 ANTIMICROBIAL INCISE DRAPE 6650EZ: Brand: IOBAN™ 2

## (undated) DEVICE — SYRINGE MED 10ML LUERLOCK TIP W/O SFTY DISP

## (undated) DEVICE — SNARE ENDOSCP M L240CM W27MM SHTH DIA2.4MM CHN 2.8MM OVL

## (undated) DEVICE — TROCAR: Brand: KII SLEEVE